# Patient Record
Sex: MALE | Race: WHITE | NOT HISPANIC OR LATINO | Employment: OTHER | URBAN - METROPOLITAN AREA
[De-identification: names, ages, dates, MRNs, and addresses within clinical notes are randomized per-mention and may not be internally consistent; named-entity substitution may affect disease eponyms.]

---

## 2017-11-04 ENCOUNTER — HOSPITAL ENCOUNTER (EMERGENCY)
Facility: HOSPITAL | Age: 82
Discharge: HOME | End: 2017-11-04
Attending: EMERGENCY MEDICINE | Admitting: EMERGENCY MEDICINE

## 2017-11-04 VITALS
WEIGHT: 215 LBS | HEART RATE: 72 BPM | HEIGHT: 72 IN | OXYGEN SATURATION: 96 % | TEMPERATURE: 96.2 F | BODY MASS INDEX: 29.12 KG/M2 | RESPIRATION RATE: 25 BRPM | SYSTOLIC BLOOD PRESSURE: 143 MMHG | DIASTOLIC BLOOD PRESSURE: 76 MMHG

## 2017-11-04 DIAGNOSIS — R11.2 NAUSEA VOMITING AND DIARRHEA: Primary | ICD-10-CM

## 2017-11-04 DIAGNOSIS — R19.7 NAUSEA VOMITING AND DIARRHEA: Primary | ICD-10-CM

## 2017-11-04 LAB
ALBUMIN SERPL-MCNC: 4.1 G/DL (ref 3.5–5.2)
ALBUMIN/GLOB SERPL: 1.2 G/DL
ALP SERPL-CCNC: 68 U/L (ref 39–117)
ALT SERPL W P-5'-P-CCNC: 24 U/L (ref 1–41)
ANION GAP SERPL CALCULATED.3IONS-SCNC: 16.5 MMOL/L
AST SERPL-CCNC: 38 U/L (ref 1–40)
BASOPHILS # BLD AUTO: 0.02 10*3/MM3 (ref 0–0.2)
BASOPHILS NFR BLD AUTO: 0.2 % (ref 0–1.5)
BILIRUB SERPL-MCNC: 0.6 MG/DL (ref 0.1–1.2)
BUN BLD-MCNC: 23 MG/DL (ref 8–23)
BUN/CREAT SERPL: 27.4 (ref 7–25)
CALCIUM SPEC-SCNC: 9.1 MG/DL (ref 8.6–10.5)
CHLORIDE SERPL-SCNC: 99 MMOL/L (ref 98–107)
CO2 SERPL-SCNC: 22.5 MMOL/L (ref 22–29)
CREAT BLD-MCNC: 0.84 MG/DL (ref 0.76–1.27)
DEPRECATED RDW RBC AUTO: 47.2 FL (ref 37–54)
EOSINOPHIL # BLD AUTO: 0.05 10*3/MM3 (ref 0–0.7)
EOSINOPHIL NFR BLD AUTO: 0.5 % (ref 0.3–6.2)
ERYTHROCYTE [DISTWIDTH] IN BLOOD BY AUTOMATED COUNT: 14 % (ref 11.5–14.5)
GFR SERPL CREATININE-BSD FRML MDRD: 86 ML/MIN/1.73
GLOBULIN UR ELPH-MCNC: 3.4 GM/DL
GLUCOSE BLD-MCNC: 130 MG/DL (ref 65–99)
HCT VFR BLD AUTO: 46 % (ref 40.4–52.2)
HGB BLD-MCNC: 14.8 G/DL (ref 13.7–17.6)
IMM GRANULOCYTES # BLD: 0.03 10*3/MM3 (ref 0–0.03)
IMM GRANULOCYTES NFR BLD: 0.3 % (ref 0–0.5)
LIPASE SERPL-CCNC: 16 U/L (ref 13–60)
LYMPHOCYTES # BLD AUTO: 1.65 10*3/MM3 (ref 0.9–4.8)
LYMPHOCYTES NFR BLD AUTO: 15.8 % (ref 19.6–45.3)
MCH RBC QN AUTO: 29.8 PG (ref 27–32.7)
MCHC RBC AUTO-ENTMCNC: 32.2 G/DL (ref 32.6–36.4)
MCV RBC AUTO: 92.7 FL (ref 79.8–96.2)
MONOCYTES # BLD AUTO: 1.69 10*3/MM3 (ref 0.2–1.2)
MONOCYTES NFR BLD AUTO: 16.2 % (ref 5–12)
NEUTROPHILS # BLD AUTO: 7 10*3/MM3 (ref 1.9–8.1)
NEUTROPHILS NFR BLD AUTO: 67 % (ref 42.7–76)
PLATELET # BLD AUTO: 198 10*3/MM3 (ref 140–500)
PMV BLD AUTO: 11 FL (ref 6–12)
POTASSIUM BLD-SCNC: 4 MMOL/L (ref 3.5–5.2)
PROT SERPL-MCNC: 7.5 G/DL (ref 6–8.5)
RBC # BLD AUTO: 4.96 10*6/MM3 (ref 4.6–6)
SODIUM BLD-SCNC: 138 MMOL/L (ref 136–145)
WBC NRBC COR # BLD: 10.44 10*3/MM3 (ref 4.5–10.7)

## 2017-11-04 PROCEDURE — 96361 HYDRATE IV INFUSION ADD-ON: CPT

## 2017-11-04 PROCEDURE — 83690 ASSAY OF LIPASE: CPT | Performed by: EMERGENCY MEDICINE

## 2017-11-04 PROCEDURE — 96374 THER/PROPH/DIAG INJ IV PUSH: CPT

## 2017-11-04 PROCEDURE — 25010000002 ONDANSETRON PER 1 MG: Performed by: EMERGENCY MEDICINE

## 2017-11-04 PROCEDURE — 80053 COMPREHEN METABOLIC PANEL: CPT | Performed by: EMERGENCY MEDICINE

## 2017-11-04 PROCEDURE — 85025 COMPLETE CBC W/AUTO DIFF WBC: CPT | Performed by: EMERGENCY MEDICINE

## 2017-11-04 PROCEDURE — 99284 EMERGENCY DEPT VISIT MOD MDM: CPT

## 2017-11-04 RX ORDER — ONDANSETRON 4 MG/1
4 TABLET, FILM COATED ORAL EVERY 8 HOURS PRN
Qty: 10 TABLET | Refills: 0 | Status: SHIPPED | OUTPATIENT
Start: 2017-11-04 | End: 2022-10-08

## 2017-11-04 RX ORDER — DIPHENOXYLATE HYDROCHLORIDE AND ATROPINE SULFATE 2.5; .025 MG/1; MG/1
1-2 TABLET ORAL 4 TIMES DAILY PRN
Qty: 20 TABLET | Refills: 0 | Status: SHIPPED | OUTPATIENT
Start: 2017-11-04 | End: 2022-10-08

## 2017-11-04 RX ORDER — ONDANSETRON 2 MG/ML
4 INJECTION INTRAMUSCULAR; INTRAVENOUS ONCE
Status: COMPLETED | OUTPATIENT
Start: 2017-11-04 | End: 2017-11-04

## 2017-11-04 RX ORDER — TERAZOSIN 1 MG/1
1 CAPSULE ORAL NIGHTLY
COMMUNITY

## 2017-11-04 RX ORDER — ASPIRIN 81 MG/1
81 TABLET ORAL DAILY
COMMUNITY
End: 2022-10-08

## 2017-11-04 RX ORDER — SODIUM CHLORIDE 0.9 % (FLUSH) 0.9 %
10 SYRINGE (ML) INJECTION AS NEEDED
Status: DISCONTINUED | OUTPATIENT
Start: 2017-11-04 | End: 2017-11-04 | Stop reason: HOSPADM

## 2017-11-04 RX ORDER — SODIUM CHLORIDE 9 MG/ML
125 INJECTION, SOLUTION INTRAVENOUS CONTINUOUS
Status: DISCONTINUED | OUTPATIENT
Start: 2017-11-04 | End: 2017-11-04 | Stop reason: HOSPADM

## 2017-11-04 RX ADMIN — SODIUM CHLORIDE 500 ML: 9 INJECTION, SOLUTION INTRAVENOUS at 02:00

## 2017-11-04 RX ADMIN — SODIUM CHLORIDE 125 ML/HR: 9 INJECTION, SOLUTION INTRAVENOUS at 02:00

## 2017-11-04 RX ADMIN — ONDANSETRON 4 MG: 2 INJECTION INTRAMUSCULAR; INTRAVENOUS at 02:03

## 2017-11-04 NOTE — ED TRIAGE NOTES
Pt reports nausea, vomiting and diarrhea since Thursday, states he tried to take immodium without relief. Denies abd. Pain

## 2017-11-04 NOTE — ED PROVIDER NOTES
EMERGENCY DEPARTMENT ENCOUNTER    CHIEF COMPLAINT  Chief Complaint: Nausea  History given by: Patient  History limited by: nothing  Room Number: 15/15  PMD: Beverly Lino MD      HPI:  Pt is a 88 y.o. male who presents complaining of nausea with waxing and waning diarrhea and vomiting for two days. Pt denies abd pain, vomiting blood, or melena. Pt denies use of antibiotics, hx of c diff, colitis, or diverticulitis. Pt has hx of umbilical hernia. . Pt has been around family members with similar symptoms.      Duration:  2 days  Onset: gradual  Timing: waxing and waning  Location: GI tract  Radiation: none  Quality: nausea  Intensity/Severity: mild  Progression: unchanged  Associated Symptoms: vomiting and diarrhea  Aggravating Factors: none  Alleviating Factors: none  Previous Episodes: none  Treatment before arrival: none    PAST MEDICAL HISTORY  Active Ambulatory Problems     Diagnosis Date Noted   • No Active Ambulatory Problems     Resolved Ambulatory Problems     Diagnosis Date Noted   • No Resolved Ambulatory Problems     Past Medical History:   Diagnosis Date   • Hyperlipidemia    • Hypertension        PAST SURGICAL HISTORY  Past Surgical History:   Procedure Laterality Date   • BACK SURGERY         FAMILY HISTORY  History reviewed. No pertinent family history.    SOCIAL HISTORY  Social History     Social History   • Marital status:      Spouse name: N/A   • Number of children: N/A   • Years of education: N/A     Occupational History   • Not on file.     Social History Main Topics   • Smoking status: Former Smoker   • Smokeless tobacco: Not on file   • Alcohol use Not on file   • Drug use: No   • Sexual activity: Defer     Other Topics Concern   • Not on file     Social History Narrative   • No narrative on file       ALLERGIES  Penicillins    REVIEW OF SYSTEMS  Review of Systems   Constitutional: Negative for chills and fever.   HENT: Negative for congestion and sore throat.    Eyes:  Negative.    Respiratory: Negative for cough and shortness of breath.    Cardiovascular: Negative for chest pain and leg swelling.   Gastrointestinal: Positive for diarrhea, nausea and vomiting (no blood). Negative for abdominal pain and blood in stool.   Genitourinary: Negative for difficulty urinating and dysuria.   Musculoskeletal: Negative for back pain and neck pain.   Skin: Negative for rash and wound.   Allergic/Immunologic: Negative.    Neurological: Negative for dizziness, weakness, numbness and headaches.   Psychiatric/Behavioral: Negative.    All other systems reviewed and are negative.      PHYSICAL EXAM  ED Triage Vitals   Temp Heart Rate Resp BP SpO2   11/04/17 0128 11/04/17 0128 11/04/17 0128 11/04/17 0128 11/04/17 0128   96.2 °F (35.7 °C) 66 18 140/68 97 %      Temp src Heart Rate Source Patient Position BP Location FiO2 (%)   11/04/17 0128 11/04/17 0128 -- -- --   Tympanic Monitor          Physical Exam   Constitutional: He is oriented to person, place, and time and well-developed, well-nourished, and in no distress.   HENT:   Head: Normocephalic and atraumatic.   Eyes: EOM are normal. Pupils are equal, round, and reactive to light.   Neck: Normal range of motion. Neck supple.   Cardiovascular: Normal rate, regular rhythm and normal heart sounds.    Pulmonary/Chest: Effort normal and breath sounds normal. No respiratory distress.   Abdominal: Soft. There is no tenderness. There is no rebound and no guarding.   Pt has a small periumbilical hernia that is easily reducible.    Musculoskeletal: Normal range of motion. He exhibits no edema.   Neurological: He is alert and oriented to person, place, and time. He has normal sensation and normal strength.   Skin: Skin is warm and dry.   Psychiatric: Mood and affect normal.   Nursing note and vitals reviewed.      LAB RESULTS  Lab Results (last 24 hours)     Procedure Component Value Units Date/Time    CBC & Differential [[id number]] Collected:  11/04/17 0157     Specimen:  Blood Updated:  11/04/17 0219    Narrative:       The following orders were created for panel order CBC & Differential.  Procedure                               Abnormality         Status                     ---------                               -----------         ------                     CBC Auto Differential[[id number]]        Abnormal            Final result                 Please view results for these tests on the individual orders.    Comprehensive Metabolic Panel [[id number]]  (Abnormal) Collected:  11/04/17 0157    Specimen:  Blood Updated:  11/04/17 0234     Glucose 130 (H) mg/dL      BUN 23 mg/dL      Creatinine 0.84 mg/dL      Sodium 138 mmol/L      Potassium 4.0 mmol/L      Chloride 99 mmol/L      CO2 22.5 mmol/L      Calcium 9.1 mg/dL      Total Protein 7.5 g/dL      Albumin 4.10 g/dL      ALT (SGPT) 24 U/L      AST (SGOT) 38 U/L       Specimen hemolyzed.  Results may be affected.        Alkaline Phosphatase 68 U/L      Total Bilirubin 0.6 mg/dL      eGFR Non African Amer 86 mL/min/1.73      Globulin 3.4 gm/dL      A/G Ratio 1.2 g/dL      BUN/Creatinine Ratio 27.4 (H)     Anion Gap 16.5 mmol/L     Narrative:       The MDRD GFR formula is only valid for adults with stable renal function between ages 18 and 70.    Lipase [[id number]]  (Normal) Collected:  11/04/17 0157    Specimen:  Blood Updated:  11/04/17 0231     Lipase 16 U/L     CBC Auto Differential [[id number]]  (Abnormal) Collected:  11/04/17 0157    Specimen:  Blood Updated:  11/04/17 0219     WBC 10.44 10*3/mm3      RBC 4.96 10*6/mm3      Hemoglobin 14.8 g/dL      Hematocrit 46.0 %      MCV 92.7 fL      MCH 29.8 pg      MCHC 32.2 (L) g/dL      RDW 14.0 %      RDW-SD 47.2 fl      MPV 11.0 fL      Platelets 198 10*3/mm3      Neutrophil % 67.0 %      Lymphocyte % 15.8 (L) %      Monocyte % 16.2 (H) %      Eosinophil % 0.5 %      Basophil % 0.2 %      Immature Grans % 0.3 %      Neutrophils, Absolute 7.00 10*3/mm3      Lymphocytes,  Absolute 1.65 10*3/mm3      Monocytes, Absolute 1.69 (H) 10*3/mm3      Eosinophils, Absolute 0.05 10*3/mm3      Basophils, Absolute 0.02 10*3/mm3      Immature Grans, Absolute 0.03 10*3/mm3           I ordered the above labs and reviewed the results    Procedures      PROGRESS AND CONSULTS  ED Course     0145  Zofran ordered.  Labs ordered for further evaluation.    0240  Patient rechecked and resting comfortably with improved nausea. Discussed negative lab results and plan to begin trial of oral fluids. Pt understands and agrees with the plan, all questions answered.    0315  Patient rechecked and tolerating fluids well. Discussed plan for discharge of pt with instructions for progressive diet. Pt and family understand and agree with plan, all questions answered.       MEDICAL DECISION MAKING  Results were reviewed/discussed with the patient and they were also made aware of online access. Pt also made aware that some labs, such as cultures, will not be resulted during ER visit and follow up with PMD is necessary.     MDM  Number of Diagnoses or Management Options     Amount and/or Complexity of Data Reviewed  Clinical lab tests: ordered and reviewed (Glucose 130  WBC 10.44)           DIAGNOSIS  Final diagnoses:   Nausea vomiting and diarrhea       DISPOSITION  DISCHARGE    Patient discharged in stable condition.    Reviewed implications of results, diagnosis, meds, responsibility to follow up, warning signs and symptoms of possible worsening, potential complications and reasons to return to ER.    Patient/Family voiced understanding of above instructions.    Discussed plan for discharge, as there is no emergent indication for admission.  Pt/family is agreeable and understands need for follow up and repeat testing.  Pt is aware that discharge does not mean that nothing is wrong but it indicates no emergency is present that requires admission and they must continue care with follow-up as given below or physician of  their choice.     FOLLOW-UP  Beverly Lino MD  [address] [facility]  [address]  [place] [address]  [phone]    Call           Medication List      New Prescriptions          diphenoxylate-atropine 2.5-0.025 MG per tablet   Commonly known as:  LOMOTIL   Take 1-2 tablets by mouth 4 (Four) Times a Day As Needed for Diarrhea.       ondansetron 4 MG tablet   Commonly known as:  ZOFRAN   Take 1 tablet by mouth Every 8 (Eight) Hours As Needed for Nausea.             Latest Documented Vital Signs:  As of 3:18 AM  BP- 123/82 HR- 75 Temp- 96.2 °F (35.7 °C) (Tympanic) O2 sat- 92%    --  Documentation assistance provided by kulwant Willis and Samantha Mckeon for Dr. Rosario.  Information recorded by the scribe was done at my direction and has been verified and validated by me.          Dhara Willis  11/04/17 0319       Samantha Mckeon  11/04/17 0322       Yuval Rosario MD  11/04/17 0645

## 2019-06-07 ENCOUNTER — APPOINTMENT (OUTPATIENT)
Dept: GENERAL RADIOLOGY | Facility: HOSPITAL | Age: 84
End: 2019-06-07

## 2019-06-07 ENCOUNTER — APPOINTMENT (OUTPATIENT)
Dept: CT IMAGING | Facility: HOSPITAL | Age: 84
End: 2019-06-07

## 2019-06-07 ENCOUNTER — HOSPITAL ENCOUNTER (EMERGENCY)
Facility: HOSPITAL | Age: 84
Discharge: HOME | End: 2019-06-07
Attending: EMERGENCY MEDICINE | Admitting: EMERGENCY MEDICINE

## 2019-06-07 VITALS
BODY MASS INDEX: 31.15 KG/M2 | TEMPERATURE: 96.8 F | OXYGEN SATURATION: 97 % | DIASTOLIC BLOOD PRESSURE: 82 MMHG | WEIGHT: 230 LBS | HEART RATE: 72 BPM | HEIGHT: 72 IN | SYSTOLIC BLOOD PRESSURE: 164 MMHG | RESPIRATION RATE: 16 BRPM

## 2019-06-07 DIAGNOSIS — S01.81XA FACIAL LACERATION, INITIAL ENCOUNTER: ICD-10-CM

## 2019-06-07 DIAGNOSIS — S60.211A CONTUSION OF RIGHT WRIST, INITIAL ENCOUNTER: ICD-10-CM
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
## 2019-06-07 DIAGNOSIS — S60.212A CONTUSION OF LEFT WRIST, INITIAL ENCOUNTER: ICD-10-CM

## 2019-06-07 PROCEDURE — 73110 X-RAY EXAM OF WRIST: CPT

## 2019-06-07 PROCEDURE — 70450 CT HEAD/BRAIN W/O DYE: CPT

## 2019-06-07 PROCEDURE — 99285 EMERGENCY DEPT VISIT HI MDM: CPT

## 2019-06-07 RX ORDER — OXYCODONE HYDROCHLORIDE AND ACETAMINOPHEN 5; 325 MG/1; MG/1
1 TABLET ORAL ONCE
Status: COMPLETED | OUTPATIENT
Start: 2019-06-07 | End: 2019-06-07

## 2019-06-07 RX ORDER — HYDROCODONE BITARTRATE AND ACETAMINOPHEN 7.5; 325 MG/1; MG/1
1 TABLET ORAL EVERY 4 HOURS PRN
Qty: 18 TABLET | Refills: 0 | Status: SHIPPED | OUTPATIENT
Start: 2019-06-07 | End: 2022-10-08

## 2019-06-07 RX ADMIN — OXYCODONE HYDROCHLORIDE AND ACETAMINOPHEN 1 TABLET: 5; 325 TABLET ORAL at 05:28

## 2019-06-07 RX ADMIN — OXYCODONE HYDROCHLORIDE AND ACETAMINOPHEN 1 TABLET: 5; 325 TABLET ORAL at 03:00

## 2019-06-07 NOTE — ED PROVIDER NOTES
EMERGENCY DEPARTMENT ENCOUNTER    CHIEF COMPLAINT  Chief Complaint: Fall  History given by: patient   History limited by: n/a   Room Number: 14/14  PMD: Beverly Lino MD      HPI:  Pt is a 89 y.o. male who presents to the ED for evaluation after a fall. Pt states that he rolled out of bed, striking the left side of his head on the bedside table. He also complains of bilateral wrist pain. He states that he was able to get up and ambulate after the fall. He denies LOC. Pt takes 81 mg of ASA daily.     Duration:  Prior to arrival  Onset: sudden  Timing: constant   Radiation: n/a   Quality: mechanical fall  Intensity/Severity: moderate  Progression: n/a   Associated Symptoms: head injury, bilateral wrist pain  Aggravating Factors: none  Alleviating Factors: none  Previous Episodes: none    PAST MEDICAL HISTORY  Active Ambulatory Problems     Diagnosis Date Noted   • No Active Ambulatory Problems     Resolved Ambulatory Problems     Diagnosis Date Noted   • No Resolved Ambulatory Problems     Past Medical History:   Diagnosis Date   • Hyperlipidemia    • Hypertension        PAST SURGICAL HISTORY  Past Surgical History:   Procedure Laterality Date   • BACK SURGERY         FAMILY HISTORY  No family history on file.    SOCIAL HISTORY  Social History     Socioeconomic History   • Marital status:      Spouse name: Not on file   • Number of children: Not on file   • Years of education: Not on file   • Highest education level: Not on file   Tobacco Use   • Smoking status: Former Smoker   Substance and Sexual Activity   • Drug use: No   • Sexual activity: Defer       ALLERGIES  Penicillins    REVIEW OF SYSTEMS  Review of Systems   Constitutional: Negative for activity change, appetite change and fever.   HENT: Negative for congestion and sore throat.    Eyes: Negative.    Respiratory: Negative for cough and shortness of breath.    Cardiovascular: Negative for chest pain and leg swelling.   Gastrointestinal:  Negative for abdominal pain, diarrhea and vomiting.   Endocrine: Negative.    Genitourinary: Negative for decreased urine volume and dysuria.   Musculoskeletal: Positive for arthralgias (bilateral wrists). Negative for neck pain.   Skin: Negative for rash and wound.   Allergic/Immunologic: Negative.    Neurological: Negative for weakness, numbness and headaches.   Hematological: Negative.    Psychiatric/Behavioral: Negative.    All other systems reviewed and are negative.      PHYSICAL EXAM  ED Triage Vitals [06/07/19 0214]   Temp Heart Rate Resp BP SpO2   96.8 °F (36 °C) 70 16 (!) 181/89 98 %      Temp src Heart Rate Source Patient Position BP Location FiO2 (%)   Tympanic Monitor Lying Left arm --       Physical Exam   Constitutional: He is oriented to person, place, and time. No distress.   HENT:   Head: Normocephalic. Head is with laceration (1 cm left brow).   Eyes: EOM are normal. Pupils are equal, round, and reactive to light.   Neck: Normal range of motion. Neck supple.   Cardiovascular: Normal rate, regular rhythm and normal heart sounds.   Pulmonary/Chest: Effort normal and breath sounds normal. No respiratory distress.   Abdominal: Soft. There is no tenderness. There is no rebound and no guarding.   Musculoskeletal: Normal range of motion. He exhibits no edema.        Right wrist: He exhibits tenderness (medial). He exhibits no swelling and no deformity.        Left wrist: He exhibits tenderness (medial). He exhibits no swelling and no deformity.   Neurological: He is alert and oriented to person, place, and time. He has normal sensation and normal strength.   Skin: Skin is warm and dry.   Psychiatric: Mood and affect normal.   Nursing note and vitals reviewed.    RADIOLOGY  XR Wrist 3+ View Bilateral   Final Result   No acute fracture or subluxation identified.       This report was finalized on 6/7/2019 3:56 AM by Dr. Jeannie Castellanos M.D.          CT Head Without Contrast   Final Result   No acute  intracranial hemorrhage identified.       Radiation dose reduction techniques were utilized, including automated   exposure control and exposure modulation based on body size.       This report was finalized on 6/7/2019 3:28 AM by Dr. Jeanine Castellanos M.D.               I ordered the above noted radiological studies. Interpreted by radiologist.  Reviewed by me in PACS.       PROCEDURES  Laceration Repair  Date/Time: 6/7/2019 5:25 AM  Performed by: Ammon Perkins MD  Authorized by: Ammon Perkins MD     Consent:     Consent obtained:  Verbal    Consent given by:  Patient  Anesthesia (see MAR for exact dosages):     Anesthesia method:  None  Laceration details:     Location:  Face    Face location:  L eyebrow    Length (cm):  1  Repair type:     Repair type:  Simple  Treatment:     Area cleansed with:  Saline    Amount of cleaning:  Standard    Irrigation solution:  Sterile saline    Irrigation method:  Tap  Skin repair:     Repair method:  Tissue adhesive  Approximation:     Approximation:  Close    Vermilion border: well-aligned    Post-procedure details:     Dressing:  Open (no dressing)    Patient tolerance of procedure:  Tolerated well, no immediate complications          PROGRESS AND CONSULTS       02:35  BP- (!) 181/89 HR- 70 Temp- 96.8 °F (36 °C) (Tympanic) O2 sat- 97%  Informed pt of the plan for CT head and XR wrist. Pt understands and agrees with the plan, all questions answered.    02:40  CT head and XR bilateral wrists ordered.     02:56  Percocet ordered to treat pain.     04:18  BP- (!) 181/89 HR- 70 Temp- 96.8 °F (36 °C) (Tympanic) O2 sat- 96%  Rechecked the patient who is in NAD and is resting comfortably. Pt reports continued pain. Informed pt that the XR and CT head show NAD. Will place bilateral wrists in splints for comfort. Informed him of the plan to repair the laceration with skin adhesive. Pt understands and agrees with the plan, all questions answered    05:22  Percocet ordered  to treat pain.     05:25  Laceration repaired with tissue adhesive. Pt will be discharged.     MEDICAL DECISION MAKING  Results were reviewed/discussed with the patient and they were also made aware of online access. Pt also made aware that some labs, such as cultures, will not be resulted during ER visit and follow up with PMD is necessary.     MDM  Number of Diagnoses or Management Options  Contusion of left wrist, initial encounter:   Contusion of right wrist, initial encounter:   Facial laceration, initial encounter:   Fall from bed, initial encounter:      Amount and/or Complexity of Data Reviewed  Tests in the radiology section of CPT®: ordered and reviewed (CT head shows NAD. XR bilateral wrists show NAD)    Patient Progress  Patient progress: stable         DIAGNOSIS  Final diagnoses:   Fall from bed, initial encounter   Facial laceration, initial encounter   Contusion of right wrist, initial encounter   Contusion of left wrist, initial encounter       DISPOSITION  DISCHARGE    Patient discharged in stable condition.    Reviewed implications of results, diagnosis, meds, responsibility to follow up, warning signs and symptoms of possible worsening, potential complications and reasons to return to ER.    Patient/Family voiced understanding of above instructions.    Discussed plan for discharge, as there is no emergent indication for admission. Patient referred to primary care provider for BP management due to today's BP. Pt/family is agreeable and understands need for follow up and repeat testing.  Pt is aware that discharge does not mean that nothing is wrong but it indicates no emergency is present that requires admission and they must continue care with follow-up as given below or physician of their choice.     FOLLOW-UP  Beverly Lino MD  [address] [place] [address]  [address]  [place] [address]  [phone]    Schedule an appointment as soon as possible for a visit           Medication List      New  Prescriptions    HYDROcodone-acetaminophen 7.5-325 MG per tablet  Commonly known as:  NORCO  Take 1 tablet by mouth Every 4 (Four) Hours As Needed for Moderate Pain .          Latest Documented Vital Signs:  As of 6:51 AM  BP- 164/82 HR- 72 Temp- 96.8 °F (36 °C) (Tympanic) O2 sat- 97%    --  Documentation assistance provided by kulwant Mckeon for Dr Perkins.  Information recorded by the scribe was done at my direction and has been verified and validated by me.     Samantha Mckeon  06/07/19 0530       Ammon Perkins MD  06/07/19 0651

## 2019-06-07 NOTE — ED NOTES
Splints placed on both wrists, pt reports some improvement in pain after application. Good radial pulses bilaterally. Face cleaned with sterile saline in prep for skin closure. Pt tolerated well.     Teresa Medina RN  06/07/19 0591

## 2019-06-07 NOTE — ED NOTES
"Pt updated on status. Pt states pain is still severe, rates it \"9 1/2\". MD aware.     Teresa Medina, RN  06/07/19 0243    "

## 2019-06-07 NOTE — DISCHARGE INSTRUCTIONS
Home, rest, home medicine as prescribed, apply ice to the wrists, follow up with PCP for recheck. Return to care with further concerns.

## 2022-10-08 ENCOUNTER — HOSPITAL ENCOUNTER (INPATIENT)
Facility: HOSPITAL | Age: 87
LOS: 7 days | Discharge: HOME HEALTH | End: 2022-10-15
Attending: EMERGENCY MEDICINE | Admitting: INTERNAL MEDICINE

## 2022-10-08 ENCOUNTER — APPOINTMENT (OUTPATIENT)
Dept: GENERAL RADIOLOGY | Facility: HOSPITAL | Age: 87
End: 2022-10-08

## 2022-10-08 ENCOUNTER — APPOINTMENT (OUTPATIENT)
Dept: CT IMAGING | Facility: HOSPITAL | Age: 87
End: 2022-10-08

## 2022-10-08 DIAGNOSIS — R29.6 FREQUENT FALLS: ICD-10-CM

## 2022-10-08 DIAGNOSIS — J81.0 ACUTE PULMONARY EDEMA: ICD-10-CM

## 2022-10-08 DIAGNOSIS — I48.91 ATRIAL FIBRILLATION WITH RVR: ICD-10-CM

## 2022-10-08 DIAGNOSIS — G93.41 ACUTE METABOLIC ENCEPHALOPATHY: Primary | ICD-10-CM

## 2022-10-08 PROBLEM — E03.9 HYPOTHYROIDISM: Status: ACTIVE | Noted: 2022-10-08

## 2022-10-08 PROBLEM — N40.0 BPH (BENIGN PROSTATIC HYPERPLASIA): Status: ACTIVE | Noted: 2022-10-08

## 2022-10-08 PROBLEM — I50.9 CHF (CONGESTIVE HEART FAILURE): Status: ACTIVE | Noted: 2022-10-08

## 2022-10-08 LAB
ALBUMIN SERPL-MCNC: 4 G/DL (ref 3.5–5.2)
ALBUMIN/GLOB SERPL: 1.6 G/DL
ALP SERPL-CCNC: 82 U/L (ref 39–117)
ALT SERPL W P-5'-P-CCNC: 31 U/L (ref 1–41)
ANION GAP SERPL CALCULATED.3IONS-SCNC: 15.4 MMOL/L (ref 5–15)
AST SERPL-CCNC: 33 U/L (ref 1–40)
BACTERIA UR QL AUTO: ABNORMAL /HPF
BASOPHILS # BLD AUTO: 0.05 10*3/MM3 (ref 0–0.2)
BASOPHILS NFR BLD AUTO: 0.5 % (ref 0–1.5)
BILIRUB SERPL-MCNC: 2.2 MG/DL (ref 0–1.2)
BILIRUB UR QL STRIP: NEGATIVE
BUN SERPL-MCNC: 30 MG/DL (ref 8–23)
BUN/CREAT SERPL: 22.2 (ref 7–25)
CALCIUM SPEC-SCNC: 9.6 MG/DL (ref 8.2–9.6)
CHLORIDE SERPL-SCNC: 106 MMOL/L (ref 98–107)
CLARITY UR: CLEAR
CO2 SERPL-SCNC: 25.6 MMOL/L (ref 22–29)
COLOR UR: ABNORMAL
CREAT SERPL-MCNC: 1.35 MG/DL (ref 0.76–1.27)
D-LACTATE SERPL-SCNC: 2.8 MMOL/L (ref 0.5–2)
DEPRECATED RDW RBC AUTO: 42.4 FL (ref 37–54)
EGFRCR SERPLBLD CKD-EPI 2021: 49 ML/MIN/1.73
EOSINOPHIL # BLD AUTO: 0.02 10*3/MM3 (ref 0–0.4)
EOSINOPHIL NFR BLD AUTO: 0.2 % (ref 0.3–6.2)
ERYTHROCYTE [DISTWIDTH] IN BLOOD BY AUTOMATED COUNT: 13.4 % (ref 12.3–15.4)
GLOBULIN UR ELPH-MCNC: 2.5 GM/DL
GLUCOSE SERPL-MCNC: 112 MG/DL (ref 65–99)
GLUCOSE UR STRIP-MCNC: NEGATIVE MG/DL
HCT VFR BLD AUTO: 40.6 % (ref 37.5–51)
HGB BLD-MCNC: 13.2 G/DL (ref 13–17.7)
HGB UR QL STRIP.AUTO: NEGATIVE
HYALINE CASTS UR QL AUTO: ABNORMAL /LPF
IMM GRANULOCYTES # BLD AUTO: 0.03 10*3/MM3 (ref 0–0.05)
IMM GRANULOCYTES NFR BLD AUTO: 0.3 % (ref 0–0.5)
INR PPP: 1.19 (ref 0.9–1.1)
KETONES UR QL STRIP: NEGATIVE
LEUKOCYTE ESTERASE UR QL STRIP.AUTO: ABNORMAL
LYMPHOCYTES # BLD AUTO: 1.14 10*3/MM3 (ref 0.7–3.1)
LYMPHOCYTES NFR BLD AUTO: 11.6 % (ref 19.6–45.3)
MAGNESIUM SERPL-MCNC: 2.1 MG/DL (ref 1.7–2.3)
MCH RBC QN AUTO: 28.1 PG (ref 26.6–33)
MCHC RBC AUTO-ENTMCNC: 32.5 G/DL (ref 31.5–35.7)
MCV RBC AUTO: 86.4 FL (ref 79–97)
MONOCYTES # BLD AUTO: 1.17 10*3/MM3 (ref 0.1–0.9)
MONOCYTES NFR BLD AUTO: 11.9 % (ref 5–12)
NEUTROPHILS NFR BLD AUTO: 7.4 10*3/MM3 (ref 1.7–7)
NEUTROPHILS NFR BLD AUTO: 75.5 % (ref 42.7–76)
NITRITE UR QL STRIP: NEGATIVE
NRBC BLD AUTO-RTO: 0 /100 WBC (ref 0–0.2)
NT-PROBNP SERPL-MCNC: 9434 PG/ML (ref 0–1800)
PH UR STRIP.AUTO: <=5 [PH] (ref 5–8)
PLATELET # BLD AUTO: 196 10*3/MM3 (ref 140–450)
PMV BLD AUTO: 11.4 FL (ref 6–12)
POTASSIUM SERPL-SCNC: 3.9 MMOL/L (ref 3.5–5.2)
PROCALCITONIN SERPL-MCNC: 0.06 NG/ML (ref 0–0.25)
PROT SERPL-MCNC: 6.5 G/DL (ref 6–8.5)
PROT UR QL STRIP: ABNORMAL
PROTHROMBIN TIME: 15.2 SECONDS (ref 11.7–14.2)
QT INTERVAL: 312 MS
RBC # BLD AUTO: 4.7 10*6/MM3 (ref 4.14–5.8)
RBC # UR STRIP: ABNORMAL /HPF
REF LAB TEST METHOD: ABNORMAL
SARS-COV-2 RNA RESP QL NAA+PROBE: NOT DETECTED
SODIUM SERPL-SCNC: 147 MMOL/L (ref 136–145)
SP GR UR STRIP: 1.02 (ref 1–1.03)
SQUAMOUS #/AREA URNS HPF: ABNORMAL /HPF
T4 FREE SERPL-MCNC: 1.3 NG/DL (ref 0.93–1.7)
TROPONIN T SERPL-MCNC: 0.03 NG/ML (ref 0–0.03)
TSH SERPL DL<=0.05 MIU/L-ACNC: 4.03 UIU/ML (ref 0.27–4.2)
UROBILINOGEN UR QL STRIP: ABNORMAL
WBC # UR STRIP: ABNORMAL /HPF
WBC NRBC COR # BLD: 9.81 10*3/MM3 (ref 3.4–10.8)

## 2022-10-08 PROCEDURE — 71045 X-RAY EXAM CHEST 1 VIEW: CPT

## 2022-10-08 PROCEDURE — 99285 EMERGENCY DEPT VISIT HI MDM: CPT

## 2022-10-08 PROCEDURE — 81001 URINALYSIS AUTO W/SCOPE: CPT | Performed by: EMERGENCY MEDICINE

## 2022-10-08 PROCEDURE — 80053 COMPREHEN METABOLIC PANEL: CPT | Performed by: EMERGENCY MEDICINE

## 2022-10-08 PROCEDURE — 70450 CT HEAD/BRAIN W/O DYE: CPT

## 2022-10-08 PROCEDURE — 84443 ASSAY THYROID STIM HORMONE: CPT | Performed by: EMERGENCY MEDICINE

## 2022-10-08 PROCEDURE — 85610 PROTHROMBIN TIME: CPT | Performed by: EMERGENCY MEDICINE

## 2022-10-08 PROCEDURE — 84484 ASSAY OF TROPONIN QUANT: CPT | Performed by: EMERGENCY MEDICINE

## 2022-10-08 PROCEDURE — 83880 ASSAY OF NATRIURETIC PEPTIDE: CPT | Performed by: EMERGENCY MEDICINE

## 2022-10-08 PROCEDURE — 84145 PROCALCITONIN (PCT): CPT | Performed by: EMERGENCY MEDICINE

## 2022-10-08 PROCEDURE — 83605 ASSAY OF LACTIC ACID: CPT | Performed by: EMERGENCY MEDICINE

## 2022-10-08 PROCEDURE — 25010000002 FUROSEMIDE PER 20 MG: Performed by: EMERGENCY MEDICINE

## 2022-10-08 PROCEDURE — 25010000002 ZIPRASIDONE MESYLATE PER 10 MG: Performed by: EMERGENCY MEDICINE

## 2022-10-08 PROCEDURE — U0003 INFECTIOUS AGENT DETECTION BY NUCLEIC ACID (DNA OR RNA); SEVERE ACUTE RESPIRATORY SYNDROME CORONAVIRUS 2 (SARS-COV-2) (CORONAVIRUS DISEASE [COVID-19]), AMPLIFIED PROBE TECHNIQUE, MAKING USE OF HIGH THROUGHPUT TECHNOLOGIES AS DESCRIBED BY CMS-2020-01-R: HCPCS | Performed by: EMERGENCY MEDICINE

## 2022-10-08 PROCEDURE — 93010 ELECTROCARDIOGRAM REPORT: CPT | Performed by: INTERNAL MEDICINE

## 2022-10-08 PROCEDURE — 99284 EMERGENCY DEPT VISIT MOD MDM: CPT

## 2022-10-08 PROCEDURE — 83735 ASSAY OF MAGNESIUM: CPT | Performed by: EMERGENCY MEDICINE

## 2022-10-08 PROCEDURE — 85025 COMPLETE CBC W/AUTO DIFF WBC: CPT | Performed by: EMERGENCY MEDICINE

## 2022-10-08 PROCEDURE — 25010000002 LORAZEPAM PER 2 MG: Performed by: NURSE PRACTITIONER

## 2022-10-08 PROCEDURE — 93005 ELECTROCARDIOGRAM TRACING: CPT | Performed by: EMERGENCY MEDICINE

## 2022-10-08 PROCEDURE — U0005 INFEC AGEN DETEC AMPLI PROBE: HCPCS | Performed by: EMERGENCY MEDICINE

## 2022-10-08 PROCEDURE — 84439 ASSAY OF FREE THYROXINE: CPT | Performed by: EMERGENCY MEDICINE

## 2022-10-08 PROCEDURE — 36415 COLL VENOUS BLD VENIPUNCTURE: CPT

## 2022-10-08 PROCEDURE — P9612 CATHETERIZE FOR URINE SPEC: HCPCS

## 2022-10-08 RX ORDER — LEVOTHYROXINE SODIUM 0.05 MG/1
50 TABLET ORAL
COMMUNITY

## 2022-10-08 RX ORDER — SODIUM CHLORIDE 0.9 % (FLUSH) 0.9 %
10 SYRINGE (ML) INJECTION AS NEEDED
Status: DISCONTINUED | OUTPATIENT
Start: 2022-10-08 | End: 2022-10-15 | Stop reason: HOSPADM

## 2022-10-08 RX ORDER — ACETAMINOPHEN 160 MG/5ML
650 SOLUTION ORAL EVERY 4 HOURS PRN
Status: DISCONTINUED | OUTPATIENT
Start: 2022-10-08 | End: 2022-10-15 | Stop reason: HOSPADM

## 2022-10-08 RX ORDER — NITROGLYCERIN 0.4 MG/1
0.4 TABLET SUBLINGUAL
Status: DISCONTINUED | OUTPATIENT
Start: 2022-10-08 | End: 2022-10-15 | Stop reason: HOSPADM

## 2022-10-08 RX ORDER — LORAZEPAM 2 MG/ML
1 INJECTION INTRAMUSCULAR ONCE
Status: COMPLETED | OUTPATIENT
Start: 2022-10-08 | End: 2022-10-08

## 2022-10-08 RX ORDER — ONDANSETRON 4 MG/1
4 TABLET, FILM COATED ORAL EVERY 6 HOURS PRN
Status: DISCONTINUED | OUTPATIENT
Start: 2022-10-08 | End: 2022-10-15 | Stop reason: HOSPADM

## 2022-10-08 RX ORDER — ZIPRASIDONE MESYLATE 20 MG/ML
10 INJECTION, POWDER, LYOPHILIZED, FOR SOLUTION INTRAMUSCULAR ONCE
Status: COMPLETED | OUTPATIENT
Start: 2022-10-08 | End: 2022-10-08

## 2022-10-08 RX ORDER — FUROSEMIDE 10 MG/ML
40 INJECTION INTRAMUSCULAR; INTRAVENOUS EVERY 12 HOURS
Status: DISCONTINUED | OUTPATIENT
Start: 2022-10-09 | End: 2022-10-10

## 2022-10-08 RX ORDER — ACETAMINOPHEN 650 MG/1
650 SUPPOSITORY RECTAL EVERY 4 HOURS PRN
Status: DISCONTINUED | OUTPATIENT
Start: 2022-10-08 | End: 2022-10-15 | Stop reason: HOSPADM

## 2022-10-08 RX ORDER — ONDANSETRON 2 MG/ML
4 INJECTION INTRAMUSCULAR; INTRAVENOUS EVERY 6 HOURS PRN
Status: DISCONTINUED | OUTPATIENT
Start: 2022-10-08 | End: 2022-10-15 | Stop reason: HOSPADM

## 2022-10-08 RX ORDER — LEVOTHYROXINE SODIUM 0.05 MG/1
50 TABLET ORAL
Status: DISCONTINUED | OUTPATIENT
Start: 2022-10-09 | End: 2022-10-15 | Stop reason: HOSPADM

## 2022-10-08 RX ORDER — ACETAMINOPHEN 325 MG/1
650 TABLET ORAL EVERY 4 HOURS PRN
Status: DISCONTINUED | OUTPATIENT
Start: 2022-10-08 | End: 2022-10-15 | Stop reason: HOSPADM

## 2022-10-08 RX ORDER — FUROSEMIDE 10 MG/ML
80 INJECTION INTRAMUSCULAR; INTRAVENOUS ONCE
Status: COMPLETED | OUTPATIENT
Start: 2022-10-08 | End: 2022-10-08

## 2022-10-08 RX ORDER — ALUMINA, MAGNESIA, AND SIMETHICONE 2400; 2400; 240 MG/30ML; MG/30ML; MG/30ML
15 SUSPENSION ORAL EVERY 6 HOURS PRN
Status: DISCONTINUED | OUTPATIENT
Start: 2022-10-08 | End: 2022-10-15 | Stop reason: HOSPADM

## 2022-10-08 RX ADMIN — FUROSEMIDE 80 MG: 10 INJECTION, SOLUTION INTRAMUSCULAR; INTRAVENOUS at 20:05

## 2022-10-08 RX ADMIN — Medication 10 ML: at 19:39

## 2022-10-08 RX ADMIN — ZIPRASIDONE MESYLATE 10 MG: 20 INJECTION, POWDER, LYOPHILIZED, FOR SOLUTION INTRAMUSCULAR at 20:57

## 2022-10-08 RX ADMIN — LORAZEPAM 1 MG: 2 INJECTION INTRAMUSCULAR; INTRAVENOUS at 22:21

## 2022-10-08 RX ADMIN — METOPROLOL TARTRATE 5 MG: 1 INJECTION, SOLUTION INTRAVENOUS at 19:38

## 2022-10-08 NOTE — ED NOTES
Patient from home via EMS. Reporting increased weakness and bilateral lower extremity edema for past 1-2 weeks. Today he was unable to get up out of a chair without dizziness and shortness of air. Patient reporting intermittent palpitations. No history of Afib.

## 2022-10-08 NOTE — ED PROVIDER NOTES
EMERGENCY DEPARTMENT ENCOUNTER    Room Number:  N445/1  Date of encounter:  10/8/2022  PCP: Beverly Lino MD  Historian: Patient and family members at bedside      HPI:  Chief Complaint: Weakness and confusion, shortness of breath  A complete HPI/ROS/PMH/PSH/SH/FH are unobtainable due to: Poor historian    Context: Rafa Bautista is a 89 y.o. male who presents to the ED c/o 2 to 3-week history of worsening generalized weakness and confusion.  It reached its apex tonight when the patient was too confused and too weak to get up out of his chair on his own.  It is reported that a month ago the patient was very self-sufficient mowing his own lawn.    They also endorse progressively worsening shortness of breath, particular with exertion as well as lower extremity edema.  Patient does not have a history of this and in fact has no previous cardiac history that they are aware of.  In fact he takes very little medication at baseline.    As far as they know he has not been febrile, he has not been complaining of anything other than the shortness of breath, and has not displayed any focal neurologic deficits other than the confusion and generalized weakness.  He has been eating and drinking until today when that has curtailed as well.  There is no nausea or vomiting, no diarrhea, no dysuria, no urinary frequency or urgency and he feels like he is emptying his bladder completely.    The patient is confused, finds it hard to answer questions appropriately although he is oriented to person and place.      PAST MEDICAL HISTORY  Active Ambulatory Problems     Diagnosis Date Noted   • No Active Ambulatory Problems     Resolved Ambulatory Problems     Diagnosis Date Noted   • No Resolved Ambulatory Problems     Past Medical History:   Diagnosis Date   • Hyperlipidemia    • Hypertension          PAST SURGICAL HISTORY  Past Surgical History:   Procedure Laterality Date   • BACK SURGERY           FAMILY HISTORY  History  reviewed. No pertinent family history.      SOCIAL HISTORY  Social History     Socioeconomic History   • Marital status:    Tobacco Use   • Smoking status: Former   Substance and Sexual Activity   • Drug use: No   • Sexual activity: Defer         ALLERGIES  Penicillins        REVIEW OF SYSTEMS  Review of Systems   Limited due to altered mental status      PHYSICAL EXAM    I have reviewed the triage vital signs and nursing notes.    ED Triage Vitals [10/08/22 1906]   Temp Heart Rate Resp BP SpO2   98.3 °F (36.8 °C) 115 22 (!) 183/130 93 %      Temp src Heart Rate Source Patient Position BP Location FiO2 (%)   -- -- -- -- --       Physical Exam  GENERAL: Awake and alert, elderly and confused  HENT: nares patent, NCAT  EYES: no scleral icterus, PERRL, EOMI  CV: Atrial fibrillation  RESPIRATORY: Increased work of breathing with rales in the bases and tachypnea  ABDOMEN: soft, protuberant but nontender and bowel sounds  MUSCULOSKELETAL: 1+ bilateral lower extremity edema  NEURO: alert, moves all extremities, follows commands.  Patient does find difficulty answering questions appropriately, appears to have a labile mood and even a little agitated  SKIN: warm, dry        LAB RESULTS  Recent Results (from the past 24 hour(s))   ECG 12 Lead    Collection Time: 10/08/22  7:17 PM   Result Value Ref Range    QT Interval 312 ms   Comprehensive Metabolic Panel    Collection Time: 10/08/22  7:24 PM    Specimen: Blood   Result Value Ref Range    Glucose 112 (H) 65 - 99 mg/dL    BUN 30 (H) 8 - 23 mg/dL    Creatinine 1.35 (H) 0.76 - 1.27 mg/dL    Sodium 147 (H) 136 - 145 mmol/L    Potassium 3.9 3.5 - 5.2 mmol/L    Chloride 106 98 - 107 mmol/L    CO2 25.6 22.0 - 29.0 mmol/L    Calcium 9.6 8.2 - 9.6 mg/dL    Total Protein 6.5 6.0 - 8.5 g/dL    Albumin 4.00 3.50 - 5.20 g/dL    ALT (SGPT) 31 1 - 41 U/L    AST (SGOT) 33 1 - 40 U/L    Alkaline Phosphatase 82 39 - 117 U/L    Total Bilirubin 2.2 (H) 0.0 - 1.2 mg/dL    Globulin 2.5  gm/dL    A/G Ratio 1.6 g/dL    BUN/Creatinine Ratio 22.2 7.0 - 25.0    Anion Gap 15.4 (H) 5.0 - 15.0 mmol/L    eGFR 49.0 (L) >60.0 mL/min/1.73   Protime-INR    Collection Time: 10/08/22  7:24 PM    Specimen: Blood   Result Value Ref Range    Protime 15.2 (H) 11.7 - 14.2 Seconds    INR 1.19 (H) 0.90 - 1.10   BNP    Collection Time: 10/08/22  7:24 PM    Specimen: Blood   Result Value Ref Range    proBNP 9,434.0 (H) 0.0 - 1,800.0 pg/mL   Troponin    Collection Time: 10/08/22  7:24 PM    Specimen: Blood   Result Value Ref Range    Troponin T 0.027 0.000 - 0.030 ng/mL   Procalcitonin    Collection Time: 10/08/22  7:24 PM    Specimen: Blood   Result Value Ref Range    Procalcitonin 0.06 0.00 - 0.25 ng/mL   Lactic Acid, Plasma    Collection Time: 10/08/22  7:24 PM    Specimen: Blood   Result Value Ref Range    Lactate 2.8 (C) 0.5 - 2.0 mmol/L   CBC Auto Differential    Collection Time: 10/08/22  7:24 PM    Specimen: Blood   Result Value Ref Range    WBC 9.81 3.40 - 10.80 10*3/mm3    RBC 4.70 4.14 - 5.80 10*6/mm3    Hemoglobin 13.2 13.0 - 17.7 g/dL    Hematocrit 40.6 37.5 - 51.0 %    MCV 86.4 79.0 - 97.0 fL    MCH 28.1 26.6 - 33.0 pg    MCHC 32.5 31.5 - 35.7 g/dL    RDW 13.4 12.3 - 15.4 %    RDW-SD 42.4 37.0 - 54.0 fl    MPV 11.4 6.0 - 12.0 fL    Platelets 196 140 - 450 10*3/mm3    Neutrophil % 75.5 42.7 - 76.0 %    Lymphocyte % 11.6 (L) 19.6 - 45.3 %    Monocyte % 11.9 5.0 - 12.0 %    Eosinophil % 0.2 (L) 0.3 - 6.2 %    Basophil % 0.5 0.0 - 1.5 %    Immature Grans % 0.3 0.0 - 0.5 %    Neutrophils, Absolute 7.40 (H) 1.70 - 7.00 10*3/mm3    Lymphocytes, Absolute 1.14 0.70 - 3.10 10*3/mm3    Monocytes, Absolute 1.17 (H) 0.10 - 0.90 10*3/mm3    Eosinophils, Absolute 0.02 0.00 - 0.40 10*3/mm3    Basophils, Absolute 0.05 0.00 - 0.20 10*3/mm3    Immature Grans, Absolute 0.03 0.00 - 0.05 10*3/mm3    nRBC 0.0 0.0 - 0.2 /100 WBC   Magnesium    Collection Time: 10/08/22  7:24 PM    Specimen: Blood   Result Value Ref Range     Magnesium 2.1 1.7 - 2.3 mg/dL   TSH    Collection Time: 10/08/22  7:24 PM    Specimen: Blood   Result Value Ref Range    TSH 4.030 0.270 - 4.200 uIU/mL   T4, Free    Collection Time: 10/08/22  7:24 PM    Specimen: Blood   Result Value Ref Range    Free T4 1.30 0.93 - 1.70 ng/dL   COVID-19,[facility] IN-HOUSE CEPHEID/ANGEL NP SWAB IN TRANSPORT MEDIA 8-12 HR TAT - Swab, Nasopharynx    Collection Time: 10/08/22  7:25 PM    Specimen: Nasopharynx; Swab   Result Value Ref Range    COVID19 Not Detected Not Detected - Ref. Range   Urinalysis With Microscopic If Indicated (No Culture) - Urine, Catheter    Collection Time: 10/08/22  8:58 PM    Specimen: Urine, Catheter   Result Value Ref Range    Color, UA Dark Yellow (A) Yellow, Straw    Appearance, UA Clear Clear    pH, UA <=5.0 5.0 - 8.0    Specific Gravity, UA 1.022 1.005 - 1.030    Glucose, UA Negative Negative    Ketones, UA Negative Negative    Bilirubin, UA Negative Negative    Blood, UA Negative Negative    Protein, UA 30 mg/dL (1+) (A) Negative    Leuk Esterase, UA Small (1+) (A) Negative    Nitrite, UA Negative Negative    Urobilinogen, UA 1.0 E.U./dL 0.2 - 1.0 E.U./dL   Urinalysis, Microscopic Only - Urine, Catheter    Collection Time: 10/08/22  8:58 PM    Specimen: Urine, Catheter   Result Value Ref Range    RBC, UA None Seen None Seen, 0-2 /HPF    WBC, UA 6-12 (A) None Seen, 0-2 /HPF    Bacteria, UA None Seen None Seen /HPF    Squamous Epithelial Cells, UA None Seen None Seen, 0-2 /HPF    Hyaline Casts, UA None Seen None Seen /LPF    Methodology Manual Light Microscopy        Ordered the above labs and independently reviewed the results.        RADIOLOGY  CT Head Without Contrast    Result Date: 10/8/2022  CT HEAD WITHOUT CONTRAST  CLINICAL HISTORY: Altered mental status for 2 weeks.  TECHNIQUE: CT scan of the head was obtained with 3 mm axial soft tissue algorithm and 2 mm bone algorithm images. No intravenous contrast was administered. Sagittal and coronal  reconstructions were obtained.  COMPARISON: CT head dated 06/07/2019.  FINDINGS:   Apparently, the patient was unable to cooperate. The submitted images are nondiagnostic with excessive amount of motion artifact. Apparently, the patient was unable to cooperate for any further imaging. There is no gross evidence for acute intracranial pathology. The ventricles, sulci, and cisterns are age appropriate. Mild changes of chronic small vessel ischemic phenomena are identified.       The submitted images are nondiagnostic due to excessive amount of motion artifact. There is no gross evidence for acute intracranial pathology. The findings as well as the marked limitations of this examination were directly discussed with Dr. Cain on 10/08/2022 at approximately 8:40 PM. I recommend repeat imaging once patient is more able to fully cooperate for the exam.    Radiation dose reduction techniques were utilized, including automated exposure control and exposure modulation based on body size.  This report was finalized on 10/8/2022 9:05 PM by Dr. Fermín Francois M.D.      XR Chest 1 View    Result Date: 10/8/2022  EXAMINATION: SINGLE VIEW CHEST RADIOGRAPH  HISTORY: 89-year-old male with history of shortness of air and weakness.  FINDINGS: An upright AP portable chest radiograph was obtained. No prior chest radiograph is available for comparison. The lungs are low in volume and there is hazy opacification at both lung bases. There is also prominence of the bilateral perihilar pulmonary vasculature. The diaphragm is silhouetted. There is obscuration of the bilateral costophrenic angles.      Low lung volumes with bibasilar atelectasis and/or pneumonia and bilateral pulmonary vascular engorgement versus edema.  This report was finalized on 10/8/2022 8:04 PM by Dr. Yang Hayward M.D.        I ordered the above noted radiological studies. Reviewed by me and discussed with radiologist.  See dictation for official radiology  interpretation.      PROCEDURES    Procedures      MEDICATIONS GIVEN IN ER    Medications   sodium chloride 0.9 % flush 10 mL (10 mL Intravenous Given 10/8/22 1939)   sodium chloride 0.9 % flush 10 mL (has no administration in time range)   nitroglycerin (NITROSTAT) SL tablet 0.4 mg (has no administration in time range)   acetaminophen (TYLENOL) tablet 650 mg (has no administration in time range)     Or   acetaminophen (TYLENOL) 160 MG/5ML solution 650 mg (has no administration in time range)     Or   acetaminophen (TYLENOL) suppository 650 mg (has no administration in time range)   ondansetron (ZOFRAN) tablet 4 mg (has no administration in time range)     Or   ondansetron (ZOFRAN) injection 4 mg (has no administration in time range)   aluminum-magnesium hydroxide-simethicone (MAALOX MAX) 400-400-40 MG/5ML suspension 15 mL (has no administration in time range)   furosemide (LASIX) injection 40 mg (has no administration in time range)   levothyroxine (SYNTHROID, LEVOTHROID) tablet 50 mcg (has no administration in time range)   metoprolol tartrate (LOPRESSOR) injection 5 mg (5 mg Intravenous Given 10/8/22 1938)   furosemide (LASIX) injection 80 mg (80 mg Intravenous Given 10/8/22 2005)   ziprasidone (GEODON) injection 10 mg (10 mg Intramuscular Given 10/8/22 2057)   LORazepam (ATIVAN) injection 1 mg (1 mg Intravenous Given 10/8/22 2221)         PROGRESS, DATA ANALYSIS, CONSULTS, AND MEDICAL DECISION MAKING    All labs have been independently reviewed by me.  All radiology studies have been reviewed by me and discussed with radiologist dictating the report.   EKG's independently viewed and interpreted by me.  Discussion below represents my analysis of pertinent findings related to patient's condition, differential diagnosis, treatment plan and final disposition.        ED Course as of 10/08/22 2337   Sat Oct 08, 2022   2334 CBC is unremarkable, chemistry shows some moderate acute kidney injury and sodium of 147.  I  suspect this is due to decreased p.o. intake [DP]   2334 Lactate is 2.8, however I do not believe this patient is septic and see no source of infection [DP]   2335 Urinalysis is unremarkable [DP]   2335 TSH is normal [DP]   2335 Troponin 0.027 and proBNP is 9300 [DP]   2335 Chest x-ray shows poor inspiratory effort with vascular congestion and the suggestion of pulmonary edema [DP]   2335 Patient's O2 sats are around 90% on room air at rest and he was placed on 2 L nasal cannula.  He is too weak to get up and even stand at the bedside to ambulate and test exertional O2 sat [DP]   2335 EKG at 2003  Atrial fibrillation in the 120s range.  This appears to be new although there is no comparison  There are no acute ST segment changes present to suggest acute ischemia. [DP]   2336 CT scan of the head without contrast is limited due to motion artifact but there are no obvious abnormality [DP]   2336 tPA is not indicated as this appears to have been an insidious progression over the last 2 weeks with no clear onset,, NIH of 0, and presentation more consistent with metabolic cause [DP]   2336 Etiology of his encephalopathy is not clear, but I have treated his atrial for been congestive failure with beta-blocker and Lasix [DP]   2337 I do not believe there is a substance abuse or alcohol related issue based on the history of the family.  He has had a couple of minor falls, but there is no sign of traumatic intracranial injury. [DP]   2337 I wonder if there is perhaps some underlying dementia which is been undiagnosed and is just exacerbated on the circumstances [DP]   2337 I spoke with Dr. Mcwilliams who agrees to admit the patient to a telemetry bed for further evaluation and manage [DP]      ED Course User Index  [DP] Valente Cain MD           PPE: The patient wore a surgical mask throughout the entire patient encounter. I wore an N95.    AS OF 23:37 EDT VITALS:    BP - 122/82  HR - 92  TEMP - 98.1 °F (36.7 °C) (Oral)  O2  SATS - 96%        DIAGNOSIS  Final diagnoses:   Acute metabolic encephalopathy   Atrial fibrillation with RVR (HCC)   Acute pulmonary edema (HCC)   Frequent falls         DISPOSITION  Admit           Valente Cain MD  10/08/22 8854

## 2022-10-09 ENCOUNTER — APPOINTMENT (OUTPATIENT)
Dept: CARDIOLOGY | Facility: HOSPITAL | Age: 87
End: 2022-10-09

## 2022-10-09 LAB
ANION GAP SERPL CALCULATED.3IONS-SCNC: 13.3 MMOL/L (ref 5–15)
BUN SERPL-MCNC: 35 MG/DL (ref 8–23)
BUN/CREAT SERPL: 23.8 (ref 7–25)
CALCIUM SPEC-SCNC: 9.3 MG/DL (ref 8.2–9.6)
CHLORIDE SERPL-SCNC: 105 MMOL/L (ref 98–107)
CHOLEST SERPL-MCNC: 136 MG/DL (ref 0–200)
CO2 SERPL-SCNC: 25.7 MMOL/L (ref 22–29)
CREAT SERPL-MCNC: 1.47 MG/DL (ref 0.76–1.27)
DEPRECATED RDW RBC AUTO: 41.5 FL (ref 37–54)
EGFRCR SERPLBLD CKD-EPI 2021: 44.2 ML/MIN/1.73
ERYTHROCYTE [DISTWIDTH] IN BLOOD BY AUTOMATED COUNT: 13.7 % (ref 12.3–15.4)
FOLATE SERPL-MCNC: 15.8 NG/ML (ref 4.78–24.2)
GLUCOSE SERPL-MCNC: 100 MG/DL (ref 65–99)
HBA1C MFR BLD: 5.6 % (ref 4.8–5.6)
HCT VFR BLD AUTO: 40.6 % (ref 37.5–51)
HDLC SERPL-MCNC: 43 MG/DL (ref 40–60)
HGB BLD-MCNC: 13.6 G/DL (ref 13–17.7)
LDLC SERPL CALC-MCNC: 79 MG/DL (ref 0–100)
LDLC/HDLC SERPL: 1.85 {RATIO}
MAGNESIUM SERPL-MCNC: 2.3 MG/DL (ref 1.7–2.3)
MCH RBC QN AUTO: 28.2 PG (ref 26.6–33)
MCHC RBC AUTO-ENTMCNC: 33.5 G/DL (ref 31.5–35.7)
MCV RBC AUTO: 84.1 FL (ref 79–97)
PLATELET # BLD AUTO: 166 10*3/MM3 (ref 140–450)
PMV BLD AUTO: 11.7 FL (ref 6–12)
POTASSIUM SERPL-SCNC: 3.9 MMOL/L (ref 3.5–5.2)
RBC # BLD AUTO: 4.83 10*6/MM3 (ref 4.14–5.8)
SODIUM SERPL-SCNC: 144 MMOL/L (ref 136–145)
TRIGL SERPL-MCNC: 68 MG/DL (ref 0–150)
VIT B12 BLD-MCNC: 1480 PG/ML (ref 211–946)
VLDLC SERPL-MCNC: 14 MG/DL (ref 5–40)
WBC NRBC COR # BLD: 12.02 10*3/MM3 (ref 3.4–10.8)

## 2022-10-09 PROCEDURE — 82746 ASSAY OF FOLIC ACID SERUM: CPT | Performed by: NURSE PRACTITIONER

## 2022-10-09 PROCEDURE — 36415 COLL VENOUS BLD VENIPUNCTURE: CPT | Performed by: NURSE PRACTITIONER

## 2022-10-09 PROCEDURE — 99222 1ST HOSP IP/OBS MODERATE 55: CPT | Performed by: PSYCHIATRY & NEUROLOGY

## 2022-10-09 PROCEDURE — 80061 LIPID PANEL: CPT | Performed by: INTERNAL MEDICINE

## 2022-10-09 PROCEDURE — 83036 HEMOGLOBIN GLYCOSYLATED A1C: CPT | Performed by: INTERNAL MEDICINE

## 2022-10-09 PROCEDURE — 85027 COMPLETE CBC AUTOMATED: CPT | Performed by: NURSE PRACTITIONER

## 2022-10-09 PROCEDURE — 83735 ASSAY OF MAGNESIUM: CPT | Performed by: INTERNAL MEDICINE

## 2022-10-09 PROCEDURE — 25010000002 FUROSEMIDE PER 20 MG: Performed by: NURSE PRACTITIONER

## 2022-10-09 PROCEDURE — 25010000002 DIGOXIN PER 500 MCG: Performed by: INTERNAL MEDICINE

## 2022-10-09 PROCEDURE — 99222 1ST HOSP IP/OBS MODERATE 55: CPT | Performed by: INTERNAL MEDICINE

## 2022-10-09 PROCEDURE — 82607 VITAMIN B-12: CPT | Performed by: NURSE PRACTITIONER

## 2022-10-09 PROCEDURE — 80048 BASIC METABOLIC PNL TOTAL CA: CPT | Performed by: NURSE PRACTITIONER

## 2022-10-09 RX ORDER — TERAZOSIN 1 MG/1
1 CAPSULE ORAL NIGHTLY
Status: DISCONTINUED | OUTPATIENT
Start: 2022-10-09 | End: 2022-10-15 | Stop reason: HOSPADM

## 2022-10-09 RX ORDER — OLANZAPINE 10 MG/1
5 INJECTION, POWDER, LYOPHILIZED, FOR SOLUTION INTRAMUSCULAR EVERY 4 HOURS PRN
Status: DISCONTINUED | OUTPATIENT
Start: 2022-10-09 | End: 2022-10-11

## 2022-10-09 RX ORDER — OLANZAPINE 10 MG/1
2.5 INJECTION, POWDER, LYOPHILIZED, FOR SOLUTION INTRAMUSCULAR ONCE
Status: COMPLETED | OUTPATIENT
Start: 2022-10-09 | End: 2022-10-09

## 2022-10-09 RX ORDER — OLANZAPINE 10 MG/1
5 INJECTION, POWDER, LYOPHILIZED, FOR SOLUTION INTRAMUSCULAR EVERY 8 HOURS PRN
Status: DISCONTINUED | OUTPATIENT
Start: 2022-10-09 | End: 2022-10-09

## 2022-10-09 RX ORDER — DIGOXIN 0.25 MG/ML
250 INJECTION INTRAMUSCULAR; INTRAVENOUS ONCE
Status: COMPLETED | OUTPATIENT
Start: 2022-10-09 | End: 2022-10-09

## 2022-10-09 RX ORDER — CARVEDILOL 6.25 MG/1
6.25 TABLET ORAL 2 TIMES DAILY WITH MEALS
Status: DISCONTINUED | OUTPATIENT
Start: 2022-10-09 | End: 2022-10-15 | Stop reason: HOSPADM

## 2022-10-09 RX ORDER — LORAZEPAM 2 MG/ML
1 INJECTION INTRAMUSCULAR ONCE
Status: DISCONTINUED | OUTPATIENT
Start: 2022-10-09 | End: 2022-10-09

## 2022-10-09 RX ADMIN — OLANZAPINE 2.5 MG: 10 INJECTION, POWDER, FOR SOLUTION INTRAMUSCULAR at 21:08

## 2022-10-09 RX ADMIN — APIXABAN 5 MG: 5 TABLET, FILM COATED ORAL at 20:47

## 2022-10-09 RX ADMIN — OLANZAPINE 5 MG: 10 INJECTION, POWDER, FOR SOLUTION INTRAMUSCULAR at 11:38

## 2022-10-09 RX ADMIN — DIGOXIN 250 MCG: 0.25 INJECTION INTRAMUSCULAR; INTRAVENOUS at 17:09

## 2022-10-09 RX ADMIN — CARVEDILOL 6.25 MG: 6.25 TABLET, FILM COATED ORAL at 20:48

## 2022-10-09 RX ADMIN — FUROSEMIDE 40 MG: 10 INJECTION, SOLUTION INTRAMUSCULAR; INTRAVENOUS at 10:40

## 2022-10-09 RX ADMIN — FUROSEMIDE 40 MG: 10 INJECTION, SOLUTION INTRAMUSCULAR; INTRAVENOUS at 20:47

## 2022-10-09 RX ADMIN — TERAZOSIN 1 MG: 1 CAPSULE ORAL at 20:47

## 2022-10-09 NOTE — PLAN OF CARE
Goal Outcome Evaluation:      Pt arrived on floor restless, agitated, pulling at lines and trying to climb out of bed. Meds given and pt calmed down. MRI screening sheet faxed. Pt unable to follow commands. He just wants to be left alone. He still is pulling at oxygen tubing and iv line. Unable to take po due to not following commands. Meds held. Afib has been in a controlled rate 90's.

## 2022-10-09 NOTE — ED NOTES
"Nursing report ED to floor  Rafa Bautista  89 y.o.  male    HPI :   Chief Complaint   Patient presents with    Palpitations    Shortness of Breath    Weakness - Generalized       Admitting doctor:   Dayron Hancock MD    Admitting diagnosis:   The primary encounter diagnosis was Acute metabolic encephalopathy. Diagnoses of Atrial fibrillation with RVR (HCC), Acute pulmonary edema (HCC), and Frequent falls were also pertinent to this visit.    Code status:   Current Code Status       Date Active Code Status Order ID Comments User Context       10/8/2022 2058 CPR (Attempt to Resuscitate) [id number]  Crysatl Mayberry, APRN ED        Question Answer    Code Status (Patient has no pulse and is not breathing) CPR (Attempt to Resuscitate)    Medical Interventions (Patient has pulse or is breathing) Full Support                    Allergies:   Penicillins    Isolation:   No active isolations    Intake and Output  No intake or output data in the 24 hours ending 10/08/22 2101    Weight:       10/08/22  1922   Weight: 90.7 kg (200 lb)       Most recent vitals:   Vitals:    10/08/22 1906 10/08/22 1922 10/08/22 2005 10/08/22 2100   BP: (!) 183/130  (!) 162/112 147/94   BP Location:    Right arm   Patient Position:    Sitting   Pulse: 115  100 106   Resp: 22   18   Temp: 98.3 °F (36.8 °C)      SpO2: 93%   97%   Weight:  90.7 kg (200 lb)     Height:  182.9 cm (72\")         Active LDAs/IV Access:   Lines, Drains & Airways       Active LDAs       Name Placement date Placement time Site Days    Peripheral IV 10/08/22 1903 Left Antecubital 10/08/22  1903  Antecubital  less than 1    External Urinary Catheter 10/08/22  2048  --  less than 1                    Labs (abnormal labs have a star):   Labs Reviewed   COMPREHENSIVE METABOLIC PANEL - Abnormal; Notable for the following components:       Result Value    Glucose 112 (*)     BUN 30 (*)     Creatinine 1.35 (*)     Sodium 147 (*)     Total Bilirubin 2.2 (*)     Anion Gap 15.4 " (*)     eGFR 49.0 (*)     All other components within normal limits    Narrative:     GFR Normal >60  Chronic Kidney Disease <60  Kidney Failure <15     PROTIME-INR - Abnormal; Notable for the following components:    Protime 15.2 (*)     INR 1.19 (*)     All other components within normal limits   BNP (IN-HOUSE) - Abnormal; Notable for the following components:    proBNP 9,434.0 (*)     All other components within normal limits    Narrative:     Among patients with dyspnea, NT-proBNP is highly sensitive for the detection of acute congestive heart failure. In addition NT-proBNP of <300 pg/ml effectively rules out acute congestive heart failure with 99% negative predictive value.    Results may be falsely decreased if patient taking Biotin.     LACTIC ACID, PLASMA - Abnormal; Notable for the following components:    Lactate 2.8 (*)     All other components within normal limits   CBC WITH AUTO DIFFERENTIAL - Abnormal; Notable for the following components:    Lymphocyte % 11.6 (*)     Eosinophil % 0.2 (*)     Neutrophils, Absolute 7.40 (*)     Monocytes, Absolute 1.17 (*)     All other components within normal limits   COVID-19,[facility] IN-HOUSE CEPHEID/ANGEL, NP SWAB IN TRANSPORT MEDIA 8-12 HR TAT - Normal    Narrative:     Fact sheet for providers: https://www.fda.gov/media/956457/download     Fact sheet for patients: https://www.fda.gov/media/075610/download   TROPONIN (IN-HOUSE) - Normal    Narrative:     Troponin T Reference Range:  <= 0.03 ng/mL-   Negative for AMI  >0.03 ng/mL-     Abnormal for myocardial necrosis.  Clinicians would have to utilize clinical acumen, EKG, Troponin and serial changes to determine if it is an Acute Myocardial Infarction or myocardial injury due to an underlying chronic condition.       Results may be falsely decreased if patient taking Biotin.     PROCALCITONIN - Normal    Narrative:     As a Marker for Sepsis (Non-Neonates):    1. <0.5 ng/mL represents a low risk of severe sepsis  "and/or septic shock.  2. >2 ng/mL represents a high risk of severe sepsis and/or septic shock.    As a Marker for Lower Respiratory Tract Infections that require antibiotic therapy:    PCT on Admission    Antibiotic Therapy       6-12 Hrs later    >0.5                Strongly Recommended  >0.25 - <0.5        Recommended   0.1 - 0.25          Discouraged              Remeasure/reassess PCT  <0.1                Strongly Discouraged     Remeasure/reassess PCT    As 28 day mortality risk marker: \"Change in Procalcitonin Result\" (>80% or <=80%) if Day 0 (or Day 1) and Day 4 values are available. Refer to http://[email or web address]-pct-calculator.com    Change in PCT <=80%  A decrease of PCT levels below or equal to 80% defines a positive change in PCT test result representing a higher risk for 28-day all-cause mortality of patients diagnosed with severe sepsis for septic shock.    Change in PCT >80%  A decrease of PCT levels of more than 80% defines a negative change in PCT result representing a lower risk for 28-day all-cause mortality of patients diagnosed with severe sepsis or septic shock.      MAGNESIUM - Normal   TSH - Normal   T4, FREE - Normal    Narrative:     Results may be falsely increased if patient taking Biotin.     COVID PRE-OP / PRE-PROCEDURE SCREENING ORDER (NO ISOLATION)    Narrative:     The following orders were created for panel order COVID PRE-OP / PRE-PROCEDURE SCREENING ORDER (NO ISOLATION) - Swab, Nasopharynx.  Procedure                               Abnormality         Status                     ---------                               -----------         ------                     [facility][[id number][facility]  Normal              Final result                 Please view results for these tests on the individual orders.   URINALYSIS W/ MICROSCOPIC IF INDICATED (NO CULTURE)   LACTIC ACID, REFLEX   CBC AND DIFFERENTIAL    Narrative:     The following orders were created for panel order CBC & " Differential.  Procedure                               Abnormality         Status                     ---------                               -----------         ------                     CBC Auto Differential[[id number]]        Abnormal            Final result                 Please view results for these tests on the individual orders.       EKG:   ECG 12 Lead   Final Result   HEART RATE= 117  bpm   RR Interval= 513  ms   SD Interval=   ms   P Horizontal Axis=   deg   P Front Axis=   deg   QRSD Interval= 82  ms   QT Interval= 312  ms   QRS Axis= 12  deg   T Wave Axis= 264  deg   - ABNORMAL ECG -   Atrial fibrillation   Multiple ventricular premature complexes   Anterior infarct, old   Nonspecific repol abnormality, inferior leads   Poor quality tracing, repeat.   Electronically Signed By: Nicholas GleasonIVORY) ([facility]) 08-Oct-2022 20:03:10   Date and Time of Study: 2022-10-08 19:17:44          Meds given in ED:   Medications   sodium chloride 0.9 % flush 10 mL (10 mL Intravenous Given 10/8/22 1939)   sodium chloride 0.9 % flush 10 mL (has no administration in time range)   nitroglycerin (NITROSTAT) SL tablet 0.4 mg (has no administration in time range)   acetaminophen (TYLENOL) tablet 650 mg (has no administration in time range)     Or   acetaminophen (TYLENOL) 160 MG/5ML solution 650 mg (has no administration in time range)     Or   acetaminophen (TYLENOL) suppository 650 mg (has no administration in time range)   ondansetron (ZOFRAN) tablet 4 mg (has no administration in time range)     Or   ondansetron (ZOFRAN) injection 4 mg (has no administration in time range)   aluminum-magnesium hydroxide-simethicone (MAALOX MAX) 400-400-40 MG/5ML suspension 15 mL (has no administration in time range)   LORazepam (ATIVAN) injection 1 mg (has no administration in time range)   furosemide (LASIX) injection 40 mg (has no administration in time range)   metoprolol tartrate (LOPRESSOR) injection 5 mg (5 mg Intravenous Given  10/8/22 1938)   furosemide (LASIX) injection 80 mg (80 mg Intravenous Given 10/8/22 2005)   ziprasidone (GEODON) injection 10 mg (10 mg Intramuscular Given 10/8/22 2057)       Imaging results:  XR Chest 1 View    Result Date: 10/8/2022  Low lung volumes with bibasilar atelectasis and/or pneumonia and bilateral pulmonary vascular engorgement versus edema.  This report was finalized on 10/8/2022 8:04 PM by Dr. Yang Hayward M.D.       Ambulatory status:   - bedrest     Social issues:   Social History     Socioeconomic History    Marital status:    Tobacco Use    Smoking status: Former   Substance and Sexual Activity    Drug use: No    Sexual activity: Defer       NIH Stroke Scale:         Olivia Lake RN  10/08/22 21:01 EDT

## 2022-10-09 NOTE — H&P
Patient Name:  Rafa Bautista  YOB: 1928  MRN:  [id number]  Admit Date:  10/8/2022  Patient Care Team:  Beverly Lino MD as PCP - General (Internal Medicine)      Subjective   History Present Illness     Chief Complaint   Patient presents with   • Palpitations   • Shortness of Breath   • Weakness - Generalized     History of Present Illness   Mr. Bautista is a 89 y.o. former smoker with a history of BPH and hypothyroidism that presents to [facility] due to weakness, confusion, and shortness of breath.  HPI has been obtained by family at bedside.  Patient resides at home with his wife and for the last 2 to 3 weeks, they have noticed worsening confusion, weakness, and shortness of breath.  Son at bedside reports that approximately a month ago he was self-sufficient and was able to mow his own yard.  They have noted shortness of breath with exertion as well as lower extremity edema.  They report no previous cardiac history.  Wife denies any recent diarrhea or complaints of abdominal pain.  They have not noticed any fever or heard him complain of any chills.      In the emergency department, he became very agitated and received Geodon.  CT of the head was performed showed no evidence of acute intracranial pathology but was limited due to excessive motion.  Chest x-ray showed bibasilar atelectasis and/or pneumonia and bilateral pulmonary vascular engorgement versus edema.  Labs obtained show a proBNP of 9434, glucose 112, sodium 147, anion gap 15.4, creat 1.35, BUN 30, lactate 2.8, and procalcitonin 0.06.  Urinalysis shows 6-12 whites.  Upon presentation, he was found to be in atrial fibrillation.  We were asked to admit the patient for new onset congestive heart failure as well as new onset atrial fibrillation.    Review of Systems   Unable to perform ROS: Mental status change        Personal History     Past Medical History:   Diagnosis Date   • Hyperlipidemia    •  Hypertension      Past Surgical History:   Procedure Laterality Date   • BACK SURGERY       History reviewed. No pertinent family history.  Social History     Tobacco Use   • Smoking status: Former   Substance Use Topics   • Drug use: No     No current facility-administered medications on file prior to encounter.     Current Outpatient Medications on File Prior to Encounter   Medication Sig Dispense Refill   • levothyroxine (SYNTHROID, LEVOTHROID) 50 MCG tablet Take 1 tablet by mouth Every Morning.     • terazosin (HYTRIN) 1 MG capsule Take 1 mg by mouth Every Night.       Allergies   Allergen Reactions   • Penicillins        Objective    Objective     Vital Signs  Temp:  [98.1 °F (36.7 °C)-98.3 °F (36.8 °C)] 98.1 °F (36.7 °C)  Heart Rate:  [] 92  Resp:  [16-22] 16  BP: (110-183)/() 122/82  SpO2:  [90 %-97 %] 96 %  on  Flow (L/min):  [0-3] 3;   Device (Oxygen Therapy): nasal cannula  Body mass index is 27.12 kg/m².    Physical Exam  Vitals and nursing note reviewed.   Constitutional:       General: He is sleeping. He is not in acute distress.     Appearance: He is well-developed. He is obese. He is not toxic-appearing.      Comments: Appears stated age   HENT:      Head: Normocephalic and atraumatic.   Eyes:      General: No scleral icterus.        Right eye: No discharge.         Left eye: No discharge.      Conjunctiva/sclera: Conjunctivae normal.   Neck:      Vascular: No JVD.   Cardiovascular:      Rate and Rhythm: Normal rate. Rhythm irregularly irregular.      Heart sounds: Normal heart sounds. No murmur heard.    No friction rub. No gallop.   Pulmonary:      Effort: Pulmonary effort is normal. No respiratory distress.      Breath sounds: Normal breath sounds. No wheezing or rales.   Abdominal:      General: Bowel sounds are normal. There is no distension.      Palpations: Abdomen is soft.      Tenderness: There is no abdominal tenderness. There is no guarding.   Musculoskeletal:         General:  No tenderness or deformity. Normal range of motion.      Cervical back: Normal range of motion and neck supple.      Right lower le+ Edema present.      Left lower le+ Edema present.   Skin:     General: Skin is warm and dry.      Capillary Refill: Capillary refill takes less than 2 seconds.   Neurological:      Comments: Sleeping, just received Ativan     Psychiatric:         Cognition and Memory: Cognition is impaired.       Results Review:  I reviewed the patient's new clinical results.  I reviewed the patient's new imaging results and agree with the interpretation.  I reviewed the patient's other test results and agree with the interpretation  I personally viewed and interpreted the patient's EKG/Telemetry data  Discussed with ED provider.    Lab Results (last 24 hours)     Procedure Component Value Units Date/Time    CBC & Differential [[id number]]  (Abnormal) Collected: 10/08/22 1924    Specimen: Blood Updated: 10/08/22 1939    Narrative:      The following orders were created for panel order CBC & Differential.  Procedure                               Abnormality         Status                     ---------                               -----------         ------                     CBC Auto Differential[[id number]]        Abnormal            Final result                 Please view results for these tests on the individual orders.    Comprehensive Metabolic Panel [[id number]]  (Abnormal) Collected: 10/08/22 1924    Specimen: Blood Updated: 10/08/22 2001     Glucose 112 mg/dL      BUN 30 mg/dL      Creatinine 1.35 mg/dL      Sodium 147 mmol/L      Potassium 3.9 mmol/L      Chloride 106 mmol/L      CO2 25.6 mmol/L      Calcium 9.6 mg/dL      Total Protein 6.5 g/dL      Albumin 4.00 g/dL      ALT (SGPT) 31 U/L      AST (SGOT) 33 U/L      Alkaline Phosphatase 82 U/L      Total Bilirubin 2.2 mg/dL      Globulin 2.5 gm/dL      A/G Ratio 1.6 g/dL      BUN/Creatinine Ratio 22.2     Anion Gap 15.4 mmol/L       eGFR 49.0 mL/min/1.73      Comment: National Kidney Foundation and American Society of Nephrology (ASN) Task Force recommended calculation based on the Chronic Kidney Disease Epidemiology Collaboration (CKD-EPI) equation refit without adjustment for race.       Narrative:      GFR Normal >60  Chronic Kidney Disease <60  Kidney Failure <15      Protime-INR [[id number]]  (Abnormal) Collected: 10/08/22 1924    Specimen: Blood Updated: 10/08/22 1948     Protime 15.2 Seconds      INR 1.19    BNP [[id number]]  (Abnormal) Collected: 10/08/22 1924    Specimen: Blood Updated: 10/08/22 2008     proBNP 9,434.0 pg/mL     Narrative:      Among patients with dyspnea, NT-proBNP is highly sensitive for the detection of acute congestive heart failure. In addition NT-proBNP of <300 pg/ml effectively rules out acute congestive heart failure with 99% negative predictive value.    Results may be falsely decreased if patient taking Biotin.      Troponin [[id number]]  (Normal) Collected: 10/08/22 1924    Specimen: Blood Updated: 10/08/22 2008     Troponin T 0.027 ng/mL     Narrative:      Troponin T Reference Range:  <= 0.03 ng/mL-   Negative for AMI  >0.03 ng/mL-     Abnormal for myocardial necrosis.  Clinicians would have to utilize clinical acumen, EKG, Troponin and serial changes to determine if it is an Acute Myocardial Infarction or myocardial injury due to an underlying chronic condition.       Results may be falsely decreased if patient taking Biotin.      Procalcitonin [[id number]]  (Normal) Collected: 10/08/22 1924    Specimen: Blood Updated: 10/08/22 2008     Procalcitonin 0.06 ng/mL     Narrative:      As a Marker for Sepsis (Non-Neonates):    1. <0.5 ng/mL represents a low risk of severe sepsis and/or septic shock.  2. >2 ng/mL represents a high risk of severe sepsis and/or septic shock.    As a Marker for Lower Respiratory Tract Infections that require antibiotic therapy:    PCT on Admission    Antibiotic Therapy       6-12 Hrs  "later    >0.5                Strongly Recommended  >0.25 - <0.5        Recommended   0.1 - 0.25          Discouraged              Remeasure/reassess PCT  <0.1                Strongly Discouraged     Remeasure/reassess PCT    As 28 day mortality risk marker: \"Change in Procalcitonin Result\" (>80% or <=80%) if Day 0 (or Day 1) and Day 4 values are available. Refer to http://www.[facility]-pct-calculator.com    Change in PCT <=80%  A decrease of PCT levels below or equal to 80% defines a positive change in PCT test result representing a higher risk for 28-day all-cause mortality of patients diagnosed with severe sepsis for septic shock.    Change in PCT >80%  A decrease of PCT levels of more than 80% defines a negative change in PCT result representing a lower risk for 28-day all-cause mortality of patients diagnosed with severe sepsis or septic shock.       Lactic Acid, Plasma [[id number]]  (Abnormal) Collected: 10/08/22 1924    Specimen: Blood Updated: 10/08/22 2016     Lactate 2.8 mmol/L     CBC Auto Differential [[id number]]  (Abnormal) Collected: 10/08/22 1924    Specimen: Blood Updated: 10/08/22 1939     WBC 9.81 10*3/mm3      RBC 4.70 10*6/mm3      Hemoglobin 13.2 g/dL      Hematocrit 40.6 %      MCV 86.4 fL      MCH 28.1 pg      MCHC 32.5 g/dL      RDW 13.4 %      RDW-SD 42.4 fl      MPV 11.4 fL      Platelets 196 10*3/mm3      Neutrophil % 75.5 %      Lymphocyte % 11.6 %      Monocyte % 11.9 %      Eosinophil % 0.2 %      Basophil % 0.5 %      Immature Grans % 0.3 %      Neutrophils, Absolute 7.40 10*3/mm3      Lymphocytes, Absolute 1.14 10*3/mm3      Monocytes, Absolute 1.17 10*3/mm3      Eosinophils, Absolute 0.02 10*3/mm3      Basophils, Absolute 0.05 10*3/mm3      Immature Grans, Absolute 0.03 10*3/mm3      nRBC 0.0 /100 WBC     Magnesium [[id number]]  (Normal) Collected: 10/08/22 1924    Specimen: Blood Updated: 10/08/22 2006     Magnesium 2.1 mg/dL     TSH [[id number]]  (Normal) Collected: 10/08/22 1924    " Specimen: Blood Updated: 10/08/22 2029     TSH 4.030 uIU/mL     T4, Free [[id number]]  (Normal) Collected: 10/08/22 1924    Specimen: Blood Updated: 10/08/22 2029     Free T4 1.30 ng/dL     Narrative:      Results may be falsely increased if patient taking Biotin.      COVID PRE-OP / PRE-PROCEDURE SCREENING ORDER (NO ISOLATION) - Swab, Nasopharynx [[id number]]  (Normal) Collected: 10/08/22 1925    Specimen: Swab from Nasopharynx Updated: 10/08/22 2013    Narrative:      The following orders were created for panel order COVID PRE-OP / PRE-PROCEDURE SCREENING ORDER (NO ISOLATION) - Swab, Nasopharynx.  Procedure                               Abnormality         Status                     ---------                               -----------         ------                     COVID-19,[facility] IN-HOUSE...[[id number]]  Normal              Final result                 Please view results for these tests on the individual orders.    COVID-19,[facility]-HOUSE CEPHEID/ANGEL NP SWAB IN TRANSPORT MEDIA 8-12 HR TAT - Swab, Nasopharynx [[id number]]  (Normal) Collected: 10/08/22 1925    Specimen: Swab from Nasopharynx Updated: 10/08/22 2013     COVID19 Not Detected    Narrative:      Fact sheet for providers: https://www.fda.gov/media/158557/download     Fact sheet for patients: https://www.fda.gov/media/607662/download    Urinalysis With Microscopic If Indicated (No Culture) - Urine, Catheter [[id number]]  (Abnormal) Collected: 10/08/22 2058    Specimen: Urine, Catheter Updated: 10/08/22 2122     Color, UA Dark Yellow     Appearance, UA Clear     pH, UA <=5.0     Specific Gravity, UA 1.022     Glucose, UA Negative     Ketones, UA Negative     Bilirubin, UA Negative     Blood, UA Negative     Protein, UA 30 mg/dL (1+)     Leuk Esterase, UA Small (1+)     Nitrite, UA Negative     Urobilinogen, UA 1.0 E.U./dL    Urinalysis, Microscopic Only - Urine, Catheter [[id number]]  (Abnormal) Collected: 10/08/22 2058    Specimen: Urine, Catheter  Updated: 10/08/22 2147     RBC, UA None Seen /HPF      WBC, UA 6-12 /HPF      Bacteria, UA None Seen /HPF      Squamous Epithelial Cells, UA None Seen /HPF      Hyaline Casts, UA None Seen /LPF      Methodology Manual Light Microscopy          Imaging Results (Last 24 Hours)     Procedure Component Value Units Date/Time    CT Head Without Contrast [[id number]] Collected: 10/08/22 2101     Updated: 10/08/22 2108    Narrative:      CT HEAD WITHOUT CONTRAST     CLINICAL HISTORY: Altered mental status for 2 weeks.     TECHNIQUE: CT scan of the head was obtained with 3 mm axial soft tissue  algorithm and 2 mm bone algorithm images. No intravenous contrast was  administered. Sagittal and coronal reconstructions were obtained.     COMPARISON: CT head dated 06/07/2019.     FINDINGS:       Apparently, the patient was unable to cooperate. The submitted images  are nondiagnostic with excessive amount of motion artifact. Apparently,  the patient was unable to cooperate for any further imaging. There is no  gross evidence for acute intracranial pathology. The ventricles, sulci,  and cisterns are age appropriate. Mild changes of chronic small vessel  ischemic phenomena are identified.       Impression:         The submitted images are nondiagnostic due to excessive amount of motion  artifact. There is no gross evidence for acute intracranial pathology.  The findings as well as the marked limitations of this examination were  directly discussed with Dr. Cain on 10/08/2022 at approximately 8:40  PM. I recommend repeat imaging once patient is more able to fully  cooperate for the exam.           Radiation dose reduction techniques were utilized, including automated  exposure control and exposure modulation based on body size.     This report was finalized on 10/8/2022 9:05 PM by Dr. Fermín Francois M.D.       XR Chest 1 View [[id number]] Collected: 10/08/22 1958     Updated: 10/08/22 2007    Narrative:      EXAMINATION: SINGLE VIEW  CHEST RADIOGRAPH     HISTORY: 89-year-old male with history of shortness of air and weakness.     FINDINGS: An upright AP portable chest radiograph was obtained. No prior  chest radiograph is available for comparison. The lungs are low in  volume and there is hazy opacification at both lung bases. There is also  prominence of the bilateral perihilar pulmonary vasculature. The  diaphragm is silhouetted. There is obscuration of the bilateral  costophrenic angles.       Impression:      Low lung volumes with bibasilar atelectasis and/or pneumonia  and bilateral pulmonary vascular engorgement versus edema.     This report was finalized on 10/8/2022 8:04 PM by Dr. Yang Hayward M.D.                 ECG 12 Lead   Final Result   HEART RATE= 117  bpm   RR Interval= 513  ms   NV Interval=   ms   P Horizontal Axis=   deg   P Front Axis=   deg   QRSD Interval= 82  ms   QT Interval= 312  ms   QRS Axis= 12  deg   T Wave Axis= 264  deg   - ABNORMAL ECG -   Atrial fibrillation   Multiple ventricular premature complexes   Anterior infarct, old   Nonspecific repol abnormality, inferior leads   Poor quality tracing, repeat.   Electronically Signed By: Nicholas GleasonIVORY) ([facility]) 08-Oct-2022 20:03:10   Date and Time of Study: 2022-10-08 19:17:44           Assessment/Plan     Active Hospital Problems    Diagnosis  POA   • **New onset atrial fibrillation (HCC) [I48.91]  Yes   • Acute metabolic encephalopathy [G93.41]  Yes   • CHF (congestive heart failure) (HCC) [I50.9]  Yes   • Hypothyroidism [E03.9]  Yes   • BPH (benign prostatic hyperplasia) [N40.0]  Yes      Resolved Hospital Problems   No resolved problems to display.       Mr. Bautista is a 89 y.o. former smoker with a history of BPH and hypothyroidism who presents with altered mental status, generalized weakness, and dyspnea.    Acute metabolic encephalopathy  -Patient received Geodon in the ED with little relief.  Upon my assessment, he also received a dose of IV Ativan that  was ordered prior to MRI but was given anyway.  He is resting now.  Vital signs are stable.  -MRI in a.m.  -Check RPR, B12, and folate in a.m.  -Neurology consultation    New onset congestive heart failure  -IV Lasix q12  -Cardiology consultation  -2D echo in a.m.  -Daily weights  -Strict I's and O    New onset atrial fibrillation  -No prior history, rate controlled  -Cardiology consultation    Hypothyroidism   -TSH 4, free T4 1.30  -Resume Synthroid therapy    BPH  -Resume terazosin once patient is able to tolerate p.o.      I discussed the patient's findings and my recommendations with family, nursing staff and Dr. Hancock.    VTE Prophylaxis - SCDs.  Code Status - Full code.       ALEJANDRO Epps  [facility]  10/8/2022  22:00 EDT

## 2022-10-09 NOTE — CONSULTS
Patient Identification:  NAME:  Rafa Bautista  Age:  89 y.o.   Sex:  male   :  1928   MRN:  [id number]       Chief complaint: He does not have one, reason for consult change in mental status, agitation and confusion    History of present illness: Patient is a 89-year-old right-handed white male with history of hyperlipidemia hypertension who comes to the hospital after about 2 or 3 weeks of increasing weakness and confusion.  Duration 2 or 3weeks.  Quality weakness and confusion over the last few days he became very confused and yesterday became combative with additional symptoms associated symptoms of hallucinations.  Modifying factors he got some Ativan that did not seem to help but eventually put him to sleep.  He is still sleepy today.  He does have an associated symptom of new onset A. fib and he is congestive heart failure.  Location is not pertinent duration as noted quality as described.  CT of the head shows chronic changes by my independent eyeball review only  He has had dizziness and shortness of breath over the last few weeks making it hard to get out of a chair    Past medical history:  Past Medical History:   Diagnosis Date   • Hyperlipidemia    • Hypertension        Past surgical history:  Past Surgical History:   Procedure Laterality Date   • BACK SURGERY         Allergies:  Penicillins    Home medications:  Medications Prior to Admission   Medication Sig Dispense Refill Last Dose   • levothyroxine (SYNTHROID, LEVOTHROID) 50 MCG tablet Take 1 tablet by mouth Every Morning.   10/8/2022   • terazosin (HYTRIN) 1 MG capsule Take 1 mg by mouth Every Night.   10/8/2022        Hospital medications:  apixaban, 5 mg, Oral, Q12H  carvedilol, 6.25 mg, Oral, BID With Meals  furosemide, 40 mg, Intravenous, Q12H  levothyroxine, 50 mcg, Oral, Q AM  terazosin, 1 mg, Oral, Nightly         •  acetaminophen **OR** acetaminophen **OR** acetaminophen  •  aluminum-magnesium hydroxide-simethicone  •   metoprolol tartrate  •  nitroglycerin  •  OLANZapine  •  ondansetron **OR** ondansetron  •  [COMPLETED] Insert peripheral IV **AND** sodium chloride  •  sodium chloride    Family history:  History reviewed. No pertinent family history.    Social history:  Social History     Tobacco Use   • Smoking status: Former   Substance Use Topics   • Drug use: No       Review of systems:      He cannot give any review of systems family notes he is got more confused over the last 2 to 3 weeks hallucinating seeing things yesterday getting more agitated and swinging at his relatives yesterday.  No other review of systems possible.  I reviewed the chart fully for the review of systems  Objective:  Vitals Ranges:   Temp:  [98.1 °F (36.7 °C)-98.3 °F (36.8 °C)] 98.1 °F (36.7 °C)  Heart Rate:  [] 86  Resp:  [16-22] 16  BP: (110-183)/() 145/111      Physical Exam:  Lethargic.  He gives a brief one-word type answer but only gives me the first syllable.  He cannot answer questions of orientation fund of knowledge attention span concentration recent remote memory cannot be tested.  He is in no distress and is turned over on his right side sleeping.  I awaken him.  He resist eye opening.  Pupils 3 constricting to 2-1/2 eyes are conjugate possible decreased left nasolabial fold but he is laying on his right side and his face is twisted a little bit that direction.  No other cranial nerves could be performed.  He pulls up his sheets with both hands and withdraws both feet to stimulation so I think he is moving all extremities equally.  No atrophy or fasciculations.  No rigidity no resting tremor reflexes trace toes downgoing bilaterally sensation coordination Station and gait completely impossible.  Heart is regular without murmur neck supple without bruits extremities no clubbing cyanosis edema visual acuity he did not blink to threat but basically keeps his eyes closed  Results review:   I reviewed the patient's new clinical  results.    Data review:  Lab Results (last 24 hours)     Procedure Component Value Units Date/Time    Basic Metabolic Panel [[id number]]  (Abnormal) Collected: 10/09/22 1014    Specimen: Blood Updated: 10/09/22 1115     Glucose 100 mg/dL      BUN 35 mg/dL      Creatinine 1.47 mg/dL      Sodium 144 mmol/L      Potassium 3.9 mmol/L      Comment: Slight hemolysis detected by analyzer. Results may be affected.        Chloride 105 mmol/L      CO2 25.7 mmol/L      Calcium 9.3 mg/dL      BUN/Creatinine Ratio 23.8     Anion Gap 13.3 mmol/L      eGFR 44.2 mL/min/1.73      Comment: National Kidney Foundation and American Society of Nephrology (ASN) Task Force recommended calculation based on the Chronic Kidney Disease Epidemiology Collaboration (CKD-EPI) equation refit without adjustment for race.       Narrative:      GFR Normal >60  Chronic Kidney Disease <60  Kidney Failure <15      Vitamin B12 [[id number]]  (Abnormal) Collected: 10/09/22 0432    Specimen: Blood Updated: 10/09/22 0724     Vitamin B-12 1,480 pg/mL     Narrative:      Results may be falsely increased if patient taking Biotin.      Folate [[id number]]  (Normal) Collected: 10/09/22 0432    Specimen: Blood Updated: 10/09/22 0724     Folate 15.80 ng/mL     Narrative:      Results may be falsely increased if patient taking Biotin.      Lipid Panel [[id number]] Collected: 10/09/22 0432    Specimen: Blood Updated: 10/09/22 0707     Total Cholesterol 136 mg/dL      Triglycerides 68 mg/dL      HDL Cholesterol 43 mg/dL      LDL Cholesterol  79 mg/dL      VLDL Cholesterol 14 mg/dL      LDL/HDL Ratio 1.85    Narrative:      Cholesterol Reference Ranges  (U.S. Department of Health and Human Services ATP III Classifications)    Desirable          <200 mg/dL  Borderline High    200-239 mg/dL  High Risk          >240 mg/dL      Triglyceride Reference Ranges  (U.S. Department of Health and Human Services ATP III Classifications)    Normal           <150 mg/dL  Borderline High   150-199 mg/dL  High             200-499 mg/dL  Very High        >500 mg/dL    HDL Reference Ranges  (U.S. Department of Health and Human Services ATP III Classifications)    Low     <40 mg/dl (major risk factor for CHD)  High    >60 mg/dl ('negative' risk factor for CHD)        LDL Reference Ranges  (U.S. Department of Health and Human Services ATP III Classifications)    Optimal          <100 mg/dL  Near Optimal     100-129 mg/dL  Borderline High  130-159 mg/dL  High             160-189 mg/dL  Very High        >189 mg/dL    Magnesium [[id number]]  (Normal) Collected: 10/09/22 0432    Specimen: Blood Updated: 10/09/22 0707     Magnesium 2.3 mg/dL     Hemoglobin A1c [[id number]]  (Normal) Collected: 10/09/22 0432    Specimen: Blood Updated: 10/09/22 0702     Hemoglobin A1C 5.60 %     Narrative:      Hemoglobin A1C Ranges:    Increased Risk for Diabetes  5.7% to 6.4%  Diabetes                     >= 6.5%  Diabetic Goal                < 7.0%    CBC (No Diff) [[id number]]  (Abnormal) Collected: 10/09/22 0432    Specimen: Blood Updated: 10/09/22 0651     WBC 12.02 10*3/mm3      RBC 4.83 10*6/mm3      Hemoglobin 13.6 g/dL      Hematocrit 40.6 %      MCV 84.1 fL      MCH 28.2 pg      MCHC 33.5 g/dL      RDW 13.7 %      RDW-SD 41.5 fl      MPV 11.7 fL      Platelets 166 10*3/mm3     Urinalysis, Microscopic Only - Urine, Catheter [[id number]]  (Abnormal) Collected: 10/08/22 2058    Specimen: Urine, Catheter Updated: 10/08/22 2147     RBC, UA None Seen /HPF      WBC, UA 6-12 /HPF      Bacteria, UA None Seen /HPF      Squamous Epithelial Cells, UA None Seen /HPF      Hyaline Casts, UA None Seen /LPF      Methodology Manual Light Microscopy    Urinalysis With Microscopic If Indicated (No Culture) - Urine, Catheter [[id number]]  (Abnormal) Collected: 10/08/22 2058    Specimen: Urine, Catheter Updated: 10/08/22 2122     Color, UA Dark Yellow     Appearance, UA Clear     pH, UA <=5.0     Specific Gravity, UA 1.022     Glucose, UA  Negative     Ketones, UA Negative     Bilirubin, UA Negative     Blood, UA Negative     Protein, UA 30 mg/dL (1+)     Leuk Esterase, UA Small (1+)     Nitrite, UA Negative     Urobilinogen, UA 1.0 E.U./dL    TSH [[id number]]  (Normal) Collected: 10/08/22 1924    Specimen: Blood Updated: 10/08/22 2029     TSH 4.030 uIU/mL     T4, Free [[id number]]  (Normal) Collected: 10/08/22 1924    Specimen: Blood Updated: 10/08/22 2029     Free T4 1.30 ng/dL     Narrative:      Results may be falsely increased if patient taking Biotin.      Lactic Acid, Plasma [[id number]]  (Abnormal) Collected: 10/08/22 1924    Specimen: Blood Updated: 10/08/22 2016     Lactate 2.8 mmol/L     COVID PRE-OP / PRE-PROCEDURE SCREENING ORDER (NO ISOLATION) - Swab, Nasopharynx [[id number]]  (Normal) Collected: 10/08/22 1925    Specimen: Swab from Nasopharynx Updated: 10/08/22 2013    Narrative:      The following orders were created for panel order COVID PRE-OP / PRE-PROCEDURE SCREENING ORDER (NO ISOLATION) - Swab, Nasopharynx.  Procedure                               Abnormality         Status                     ---------                               -----------         ------                     COVID-19,[facility]HOU[facility]...[[id number]]  Normal              Final result                 Please view results for these tests on the individual orders.    COVID-19,[facility] CEPHEID/ANGEL NP SWAB IN TRANSPORT MEDIA 8-12 HR TAT - Swab, Nasopharynx [[id number]]  (Normal) Collected: 10/08/22 1925    Specimen: Swab from Nasopharynx Updated: 10/08/22 2013     COVID19 Not Detected    Narrative:      Fact sheet for providers: https://www.fda.gov/media/080282/download     Fact sheet for patients: https://www.fda.gov/media/616600/download    Procalcitonin [[id number]]  (Normal) Collected: 10/08/22 1924    Specimen: Blood Updated: 10/08/22 2008     Procalcitonin 0.06 ng/mL     Narrative:      As a Marker for Sepsis (Non-Neonates):    1. <0.5 ng/mL represents a low  "risk of severe sepsis and/or septic shock.  2. >2 ng/mL represents a high risk of severe sepsis and/or septic shock.    As a Marker for Lower Respiratory Tract Infections that require antibiotic therapy:    PCT on Admission    Antibiotic Therapy       6-12 Hrs later    >0.5                Strongly Recommended  >0.25 - <0.5        Recommended   0.1 - 0.25          Discouraged              Remeasure/reassess PCT  <0.1                Strongly Discouraged     Remeasure/reassess PCT    As 28 day mortality risk marker: \"Change in Procalcitonin Result\" (>80% or <=80%) if Day 0 (or Day 1) and Day 4 values are available. Refer to http://[email or web address] [email or web address]pct-[email or web address]    Change in PCT <=80%  A decrease of PCT levels below or equal to 80% defines a positive change in PCT test result representing a higher risk for 28-day all-cause mortality of patients diagnosed with severe sepsis for septic shock.    Change in PCT >80%  A decrease of PCT levels of more than 80% defines a negative change in PCT result representing a lower risk for 28-day all-cause mortality of patients diagnosed with severe sepsis or septic shock.       BNP [[id number]]  (Abnormal) Collected: 10/08/22 1924    Specimen: Blood Updated: 10/08/22 2008     proBNP 9,434.0 pg/mL     Narrative:      Among patients with dyspnea, NT-proBNP is highly sensitive for the detection of acute congestive heart failure. In addition NT-proBNP of <300 pg/ml effectively rules out acute congestive heart failure with 99% negative predictive value.    Results may be falsely decreased if patient taking Biotin.      Troponin [[id number]]  (Normal) Collected: 10/08/22 1924    Specimen: Blood Updated: 10/08/22 2008     Troponin T 0.027 ng/mL     Narrative:      Troponin T Reference Range:  <= 0.03 ng/mL-   Negative for AMI  >0.03 ng/mL-     Abnormal for myocardial necrosis.  Clinicians would have to utilize clinical acumen, EKG, Troponin and serial changes to determine if it is an Acute " Myocardial Infarction or myocardial injury due to an underlying chronic condition.       Results may be falsely decreased if patient taking Biotin.      Magnesium [[id number]]  (Normal) Collected: 10/08/22 1924    Specimen: Blood Updated: 10/08/22 2006     Magnesium 2.1 mg/dL     Comprehensive Metabolic Panel [[id number]]  (Abnormal) Collected: 10/08/22 1924    Specimen: Blood Updated: 10/08/22 2001     Glucose 112 mg/dL      BUN 30 mg/dL      Creatinine 1.35 mg/dL      Sodium 147 mmol/L      Potassium 3.9 mmol/L      Chloride 106 mmol/L      CO2 25.6 mmol/L      Calcium 9.6 mg/dL      Total Protein 6.5 g/dL      Albumin 4.00 g/dL      ALT (SGPT) 31 U/L      AST (SGOT) 33 U/L      Alkaline Phosphatase 82 U/L      Total Bilirubin 2.2 mg/dL      Globulin 2.5 gm/dL      A/G Ratio 1.6 g/dL      BUN/Creatinine Ratio 22.2     Anion Gap 15.4 mmol/L      eGFR 49.0 mL/min/1.73      Comment: National Kidney Foundation and American Society of Nephrology (ASN) Task Force recommended calculation based on the Chronic Kidney Disease Epidemiology Collaboration (CKD-EPI) equation refit without adjustment for race.       Narrative:      GFR Normal >60  Chronic Kidney Disease <60  Kidney Failure <15      Protime-INR [[id number]]  (Abnormal) Collected: 10/08/22 1924    Specimen: Blood Updated: 10/08/22 1948     Protime 15.2 Seconds      INR 1.19    CBC & Differential [[id number]]  (Abnormal) Collected: 10/08/22 1924    Specimen: Blood Updated: 10/08/22 1939    Narrative:      The following orders were created for panel order CBC & Differential.  Procedure                               Abnormality         Status                     ---------                               -----------         ------                     CBC Auto Differential[[id number]]        Abnormal            Final result                 Please view results for these tests on the individual orders.    CBC Auto Differential [[id number]]  (Abnormal) Collected: 10/08/22 1924     Specimen: Blood Updated: 10/08/22 1939     WBC 9.81 10*3/mm3      RBC 4.70 10*6/mm3      Hemoglobin 13.2 g/dL      Hematocrit 40.6 %      MCV 86.4 fL      MCH 28.1 pg      MCHC 32.5 g/dL      RDW 13.4 %      RDW-SD 42.4 fl      MPV 11.4 fL      Platelets 196 10*3/mm3      Neutrophil % 75.5 %      Lymphocyte % 11.6 %      Monocyte % 11.9 %      Eosinophil % 0.2 %      Basophil % 0.5 %      Immature Grans % 0.3 %      Neutrophils, Absolute 7.40 10*3/mm3      Lymphocytes, Absolute 1.14 10*3/mm3      Monocytes, Absolute 1.17 10*3/mm3      Eosinophils, Absolute 0.02 10*3/mm3      Basophils, Absolute 0.05 10*3/mm3      Immature Grans, Absolute 0.03 10*3/mm3      nRBC 0.0 /100 WBC            Imaging:  Imaging Results (Last 24 Hours)     Procedure Component Value Units Date/Time    CT Head Without Contrast [[id number]] Collected: 10/08/22 2101     Updated: 10/08/22 2108    Narrative:      CT HEAD WITHOUT CONTRAST     CLINICAL HISTORY: Altered mental status for 2 weeks.     TECHNIQUE: CT scan of the head was obtained with 3 mm axial soft tissue  algorithm and 2 mm bone algorithm images. No intravenous contrast was  administered. Sagittal and coronal reconstructions were obtained.     COMPARISON: CT head dated 06/07/2019.     FINDINGS:       Apparently, the patient was unable to cooperate. The submitted images  are nondiagnostic with excessive amount of motion artifact. Apparently,  the patient was unable to cooperate for any further imaging. There is no  gross evidence for acute intracranial pathology. The ventricles, sulci,  and cisterns are age appropriate. Mild changes of chronic small vessel  ischemic phenomena are identified.       Impression:         The submitted images are nondiagnostic due to excessive amount of motion  artifact. There is no gross evidence for acute intracranial pathology.  The findings as well as the marked limitations of this examination were  directly discussed with Dr. Cain on 10/08/2022 at  approximately 8:40  PM. I recommend repeat imaging once patient is more able to fully  cooperate for the exam.           Radiation dose reduction techniques were utilized, including automated  exposure control and exposure modulation based on body size.     This report was finalized on 10/8/2022 9:05 PM by Dr. Fermín Francois M.D.       XR Chest 1 View [[id number]] Collected: 10/08/22 1958     Updated: 10/08/22 2007    Narrative:      EXAMINATION: SINGLE VIEW CHEST RADIOGRAPH     HISTORY: 89-year-old male with history of shortness of air and weakness.     FINDINGS: An upright AP portable chest radiograph was obtained. No prior  chest radiograph is available for comparison. The lungs are low in  volume and there is hazy opacification at both lung bases. There is also  prominence of the bilateral perihilar pulmonary vasculature. The  diaphragm is silhouetted. There is obscuration of the bilateral  costophrenic angles.       Impression:      Low lung volumes with bibasilar atelectasis and/or pneumonia  and bilateral pulmonary vascular engorgement versus edema.     This report was finalized on 10/8/2022 8:04 PM by Dr. Yang Hayward M.D.            PPE worn at all times washed before washed up afterwards disposed of everything properly is now within 6 feet of them for more than few minutes during my exam no aerosols used at any point    Assessment and Plan:     This patient presents with new onset severe confusion and new onset A. fib.  The CT of the head does not show any acute hemorrhage or area of stroke and his exam is not at all suggestive of an acute stroke syndrome.  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   I Would query whether he could have suffered a stroke syndrome.  Even though he does not have any focal neurologic deficit at any time  This patient definitely has psychosis.  He has  been hallucinating seeing things, etc. and I like the as needed Zyprexa at this time for agitation, or if he is frightened.  He does not look like an acute stroke at this time but it is certainly possible.  At some point I would like to get an MRI scan of the brain but he is absolutely not going to tolerate an MRI of his brain at this time.  Lets see how he does overnight with the Zyprexa.  Thanks      Josr Camp MD  10/09/22  14:50 EDT

## 2022-10-09 NOTE — PROGRESS NOTES
Name: Rafa Bautista ADMIT: 10/8/2022   : 1928  PCP: Beverly Lino MD    MRN: [id number] LOS: 1 days   AGE/SEX: 89 y.o. male  ROOM: [facility]     Subjective   Subjective   The patient is asleep, prior to my arrival the patient was extremely agitated, attempted to climb out of the bed.  He received a 5 mg intramuscular injection of Zyprexa and has since settled down.  Family is at bedside requesting he be allowed to sleep.    Review of Systems   Unable to perform ROS: Mental status change        Objective   Objective   Vital Signs  Temp:  [98.1 °F (36.7 °C)-98.3 °F (36.8 °C)] 98.1 °F (36.7 °C)  Heart Rate:  [] 73  Resp:  [16-22] 16  BP: (110-183)/() 145/111  SpO2:  [90 %-100 %] 98 %  on  Flow (L/min):  [0-3] 3;   Device (Oxygen Therapy): humidified;nasal cannula  Body mass index is 27.36 kg/m².  Physical Exam  Constitutional:       General: He is not in acute distress.     Appearance: Normal appearance. He is not toxic-appearing.      Comments: Elderly   Cardiovascular:      Rate and Rhythm: Normal rate and regular rhythm.      Heart sounds: No murmur heard.  Pulmonary:      Effort: Pulmonary effort is normal. No respiratory distress.      Breath sounds: Normal breath sounds. No wheezing.   Abdominal:      General: Abdomen is flat. Bowel sounds are normal. There is no distension.      Palpations: Abdomen is soft.      Tenderness: There is no abdominal tenderness.   Musculoskeletal:         General: No tenderness.      Right lower leg: Edema present.      Left lower leg: Edema present.      Comments: B/l pitting edema   Skin:     General: Skin is warm and dry.   Neurological:      Mental Status: He is disoriented.      Motor: No weakness.         Results Review     I reviewed the patient's new clinical results.  Results from last 7 days   Lab Units 10/09/22  0432 10/08/22  1924   WBC 10*3/mm3 12.02* 9.81   HEMOGLOBIN g/dL 13.6 13.2   PLATELETS 10*3/mm3 166 196     Results from last 7  days   Lab Units 10/09/22  1014 10/08/22  1924   SODIUM mmol/L 144 147*   POTASSIUM mmol/L 3.9 3.9   CHLORIDE mmol/L 105 106   CO2 mmol/L 25.7 25.6   BUN mg/dL 35* 30*   CREATININE mg/dL 1.47* 1.35*   GLUCOSE mg/dL 100* 112*   Estimated Creatinine Clearance: 40.6 mL/min (A) (by C-G formula based on SCr of 1.47 mg/dL (H)).  Results from last 7 days   Lab Units 10/08/22  1924   ALBUMIN g/dL 4.00   BILIRUBIN mg/dL 2.2*   ALK PHOS U/L 82   AST (SGOT) U/L 33   ALT (SGPT) U/L 31     Results from last 7 days   Lab Units 10/09/22  1014 10/09/22  0432 10/08/22  1924   CALCIUM mg/dL 9.3  --  9.6   ALBUMIN g/dL  --   --  4.00   MAGNESIUM mg/dL  --  2.3 2.1     Results from last 7 days   Lab Units 10/08/22  1924   PROCALCITONIN ng/mL 0.06   LACTATE mmol/L 2.8*     COVID19   Date Value Ref Range Status   10/08/2022 Not Detected Not Detected - Ref. Range Final     Hemoglobin A1C   Date/Time Value Ref Range Status   10/09/2022 0432 5.60 4.80 - 5.60 % Final       CT Head Without Contrast  Narrative: CT HEAD WITHOUT CONTRAST     CLINICAL HISTORY: Altered mental status for 2 weeks.     TECHNIQUE: CT scan of the head was obtained with 3 mm axial soft tissue  algorithm and 2 mm bone algorithm images. No intravenous contrast was  administered. Sagittal and coronal reconstructions were obtained.     COMPARISON: CT head dated 06/07/2019.     FINDINGS:       Apparently, the patient was unable to cooperate. The submitted images  are nondiagnostic with excessive amount of motion artifact. Apparently,  the patient was unable to cooperate for any further imaging. There is no  gross evidence for acute intracranial pathology. The ventricles, sulci,  and cisterns are age appropriate. Mild changes of chronic small vessel  ischemic phenomena are identified.     Impression:    The submitted images are nondiagnostic due to excessive amount of motion  artifact. There is no gross evidence for acute intracranial pathology.  The findings as well as the  marked limitations of this examination were  directly discussed with Dr. Cain on 10/08/2022 at approximately 8:40  PM. I recommend repeat imaging once patient is more able to fully  cooperate for the exam.           Radiation dose reduction techniques were utilized, including automated  exposure control and exposure modulation based on body size.     This report was finalized on 10/8/2022 9:05 PM by Dr. Fermín Francois M.D.     XR Chest 1 View  Narrative: EXAMINATION: SINGLE VIEW CHEST RADIOGRAPH     HISTORY: 89-year-old male with history of shortness of air and weakness.     FINDINGS: An upright AP portable chest radiograph was obtained. No prior  chest radiograph is available for comparison. The lungs are low in  volume and there is hazy opacification at both lung bases. There is also  prominence of the bilateral perihilar pulmonary vasculature. The  diaphragm is silhouetted. There is obscuration of the bilateral  costophrenic angles.     Impression: Low lung volumes with bibasilar atelectasis and/or pneumonia  and bilateral pulmonary vascular engorgement versus edema.     This report was finalized on 10/8/2022 8:04 PM by Dr. Yang Hayward M.D.       Scheduled Medications  apixaban, 5 mg, Oral, Q12H  carvedilol, 6.25 mg, Oral, BID With Meals  furosemide, 40 mg, Intravenous, Q12H  levothyroxine, 50 mcg, Oral, Q AM  OLANZapine, 2.5 mg, Intramuscular, Once  terazosin, 1 mg, Oral, Nightly    Infusions   Diet  NPO Diet NPO Type: Strict NPO         I have personally reviewed:  [x]  Laboratory   []  Microbiology   [x]  Radiology   []  EKG/Telemetry   []  Cardiology/Vascular   []  Pathology   [x]  Records    Assessment/Plan     Active Hospital Problems    Diagnosis  POA   • **Acute metabolic encephalopathy [G93.41]  Yes   • New onset atrial fibrillation (HCC) [I48.91]  Yes   • CHF (congestive heart failure) (HCC) [I50.9]  Yes   • Hypothyroidism [E03.9]  Yes   • BPH (benign prostatic hyperplasia) [N40.0]  Yes       Resolved Hospital Problems   No resolved problems to display.       89 y.o. male admitted with Acute metabolic encephalopathy.    New atrial fibrillation with RVR  Acute congestive heart failure- precipitated by a fib?   - cardiology consulted; plan for echo  - rate control with coreg, lasix IV for diuresis    Acute Encephalopathy  - per family at bedside he has some mild dementia at baseline but is normally A & O x4/can carry a normal conversation/is still extremely active/drives  - neuro following, MRI is pending to exclude stroke syndrome- no lateralizing features on exam  - Zyprexa for as needed agitation; suspect delirium worsening confusion    HTN  - coreg and lasix as above, further titration as needed     Hypothyroidism  - Resume synthroid. TSH ok.     BPH  - Home regimen    · Eliquis (home med) for DVT prophylaxis.  · DNR.  · Discussed with patient, spouse, family and nursing staff.  · Anticipate discharge tbd pending clinical course      Jennifer Bailey MD  [facility]  10/09/22  16:31 EDT    I wore protective equipment throughout this patient encounter including a face mask, gloves and protective eyewear.  Hand hygiene was performed before donning protective equipment and after removal when leaving the room.

## 2022-10-09 NOTE — CONSULTS
Patient Name: Rafa Bautista  :1928  89 y.o.    Date of Admission: 10/8/2022  Date of Consultation:  10/09/22  Encounter Provider: Hannah Marr MD  Place of Service: [facility] CARDIOLOGY  Referring Provider: Dayron Hancock MD  Patient Care Team:  Beverly Lino MD as PCP - General (Internal Medicine)      Chief complaint: increased weakness, increased lower extremity edema x 1-2 weeks    Reason for consult: new onset atrial fibrillation, CHF    History of Present Illness:    This is a 89-year-old patient with history of hyperlipidemia and hypertension who presented to the emergency department with worsening weakness and confusion over 2 to 3 weeks.  According to family, he was too weak to get out of the chair on his own at home.  1 month ago, he was able to mow his own lawn.  The family also says he has had progressive lower extremity edema and short windedness but no chest pain.    On arrival, ECG showed A. fib with heart rate 117, troponin 0.027, proBNP 9434, creatinine 1.35, TSH 4.0, free T4 normal, lactate 2.8, white count 9.1, normal procalcitonin.  Chest x-ray showed atelectasis versus pneumonia and bilateral pulmonary vascular engorgement.    In the emergency department, he was given IV Lasix once is currently receiving IV Lasix twice daily.  He was given IV Lopressor and started on 25 metoprolol tartrate twice daily.  Echo has been ordered.  We been asked see him for heart failure and A. Fib.    Family gave history because the patient is very confused and restless.  He just got something for agitation and is very somnolent.  2 weeks ago, he was still driving, a month ago he was still mowing his grass.  He has been very active 89-year-old.  He started falling about 2 weeks ago for reasons that were unclear and nobody witnessed any of his falls.  There were 3 falls, 1 of which he could get up from but to which he had to have help to get up.  He  complained of short windedness to his family but never complained of dizziness.  He was progressively weak and a week ago he started using a walker which was new for him to.  He never complained to them of chest pain or heart racing and had no nausea.    Cardiac testing:  None available for review.    Past Medical History:   Diagnosis Date   • Hyperlipidemia    • Hypertension        Past Surgical History:   Procedure Laterality Date   • BACK SURGERY           Prior to Admission medications    Medication Sig Start Date End Date Taking? Authorizing Provider   levothyroxine (SYNTHROID, LEVOTHROID) 50 MCG tablet Take 1 tablet by mouth Every Morning.   Yes Provider, MD Myles   terazosin (HYTRIN) 1 MG capsule Take 1 mg by mouth Every Night.   Yes Provider, Myles, MD       Allergies   Allergen Reactions   • Penicillins        Social History     Socioeconomic History   • Marital status:    Tobacco Use   • Smoking status: Former   Substance and Sexual Activity   • Drug use: No   • Sexual activity: Defer       History reviewed. No pertinent family history.    REVIEW OF SYSTEMS:   All systems reviewed.  Pertinent positives identified in HPI.  All other systems are negative.      Objective:     Vitals:    10/08/22 2215 10/08/22 2311 10/09/22 0511 10/09/22 0738   BP: 110/100 122/82  (!) 145/111   BP Location: Left arm Left arm  Left arm   Patient Position: Lying Lying  Lying   Pulse:  92  86   Resp: 18 16  16   Temp: 98.2 °F (36.8 °C) 98.1 °F (36.7 °C)     TempSrc:  Oral  Oral   SpO2: 90% 96%  100%   Weight:   91.5 kg (201 lb 11.2 oz)    Height:         Body mass index is 27.36 kg/m².    General Appearance:   Confused and somnolent   Head:    Normocephalic, without obvious abnormality, atraumatic   Eyes:            Lids and lashes normal, conjunctivae and sclerae normal, no icterus, no pallor, corneas clear,    Ears:    Ears appear intact with no abnormalities noted   Throat:   No oral lesions, no thrush, oral  mucosa moist   Neck:   No adenopathy, supple, trachea midline, no thyromegaly, no carotid bruit, no JVD   Back:     No kyphosis present, no scoliosis present, no skin lesions, erythema or scars, no tenderness to palpation, range of motion normal   Lungs:     Clear to auscultation-decreased on the left side and is lying on the left side, respirations regular, even and unlabored    Heart:    iregular rhythm and tachycardic rate, normal S1 and S2, no murmur, no gallop, no rub, no click   Chest Wall:    No abnormalities observed   Abdomen:     Normal bowel sounds, no masses, no organomegaly, soft, nontender, nondistended, no guarding, no rebound  tenderness   Extremities:   Moves all extremities well, no edema, no cyanosis, no redness   Pulses:   Pulses palpable and equal bilaterally. Normal radial, carotid, dorsalis pedis and posterior tibial pulses bilaterally.    Skin:  Psychiatric:   No bleeding, bruising or rash    Alert and oriented x 3, normal mood and affect   Lab Review:     Results from last 7 days   Lab Units 10/08/22  1924   SODIUM mmol/L 147*   POTASSIUM mmol/L 3.9   CHLORIDE mmol/L 106   CO2 mmol/L 25.6   BUN mg/dL 30*   CREATININE mg/dL 1.35*   CALCIUM mg/dL 9.6   BILIRUBIN mg/dL 2.2*   ALK PHOS U/L 82   ALT (SGPT) U/L 31   AST (SGOT) U/L 33   GLUCOSE mg/dL 112*     Results from last 7 days   Lab Units 10/08/22  1924   TROPONIN T ng/mL 0.027     Results from last 7 days   Lab Units 10/09/22  0432   WBC 10*3/mm3 12.02*   HEMOGLOBIN g/dL 13.6   HEMATOCRIT % 40.6   PLATELETS 10*3/mm3 166     Results from last 7 days   Lab Units 10/08/22  1924   INR  1.19*     Results from last 7 days   Lab Units 10/09/22  0432   MAGNESIUM mg/dL 2.3     Results from last 7 days   Lab Units 10/09/22  0432   CHOLESTEROL mg/dL 136   TRIGLYCERIDES mg/dL 68   HDL CHOL mg/dL 43   LDL CHOL mg/dL 79             EKG      Baseline EKG  No prior available for comparison.       I personally viewed and interpreted the patient's  EKG/Telemetry data.        Assessment and Plan:       1. A. fib with RVR, heart rate is better-do not know how long this has been going on.  2. Acute congestive heart failure, likely diastolic in nature due to A. fib with RVR.  3. Falls x3 over the last 2 weeks-this is new for him.  He is really been able to ambulate without assistance until 1 week ago and has been using a walker since then  4. Hypertension  5. DILLON  6. Agitation and confusion    We will check an echocardiogram, will try to control heart rate and continue to diurese.  His blood pressure is also quite high-likely worsened by agitation and confusion.  Change carvedilol 6.25 twice daily for blood pressure and heart rate control as well as heart failure.  Maintain IV Lasix, 1 dose of IV dig.  May need to try a very low-dose of losartan to see that helps his blood pressure but will have to monitor creatinine as well.  Await echocardiogram.    All history was provided by the family who is at bedside, they are very reasonable about his care and helpful with information.    Start apixaban 5 mg twice daily but watch renal function.  Right now he still meets criteria for this because his renal function worsens, may need to decrease this to 2.5 mg twice daily.  We could use IV heparin because he might need a heart catheterization been reluctant to do so because of his agitation and him possibly pulling out IVs.    Hannah Marr MD  10/09/22  10:56 EDT

## 2022-10-10 ENCOUNTER — APPOINTMENT (OUTPATIENT)
Dept: CARDIOLOGY | Facility: HOSPITAL | Age: 87
End: 2022-10-10

## 2022-10-10 ENCOUNTER — APPOINTMENT (OUTPATIENT)
Dept: MRI IMAGING | Facility: HOSPITAL | Age: 87
End: 2022-10-10

## 2022-10-10 LAB
AORTIC DIMENSIONLESS INDEX: 0.6 (DI)
BH CV ECHO MEAS - ACS: 1.46 CM
BH CV ECHO MEAS - AO MAX PG: 3.8 MMHG
BH CV ECHO MEAS - AO MEAN PG: 1.84 MMHG
BH CV ECHO MEAS - AO V2 MAX: 97 CM/SEC
BH CV ECHO MEAS - AO V2 VTI: 15.9 CM
BH CV ECHO MEAS - AVA(I,D): 3.8 CM2
BH CV ECHO MEAS - CONTRAST EF 4CH: 34 CM2
BH CV ECHO MEAS - EDV(CUBED): 174.9 ML
BH CV ECHO MEAS - EDV(MOD-SP2): 149 ML
BH CV ECHO MEAS - EDV(MOD-SP4): 142 ML
BH CV ECHO MEAS - EF(MOD-BP): 34 %
BH CV ECHO MEAS - EF(MOD-SP2): 36.9 %
BH CV ECHO MEAS - EF(MOD-SP4): 32.4 %
BH CV ECHO MEAS - ESV(CUBED): 104.1 ML
BH CV ECHO MEAS - ESV(MOD-SP2): 94 ML
BH CV ECHO MEAS - ESV(MOD-SP4): 96 ML
BH CV ECHO MEAS - FS: 15.9 %
BH CV ECHO MEAS - IVS/LVPW: 0.98 CM
BH CV ECHO MEAS - IVSD: 0.97 CM
BH CV ECHO MEAS - LAT PEAK E' VEL: 12.3 CM/SEC
BH CV ECHO MEAS - LV DIASTOLIC VOL/BSA (35-75): 66.2 CM2
BH CV ECHO MEAS - LV MASS(C)D: 212.8 GRAMS
BH CV ECHO MEAS - LV MAX PG: 3 MMHG
BH CV ECHO MEAS - LV MEAN PG: 0.96 MMHG
BH CV ECHO MEAS - LV SYSTOLIC VOL/BSA (12-30): 44.8 CM2
BH CV ECHO MEAS - LV V1 MAX: 86.2 CM/SEC
BH CV ECHO MEAS - LV V1 VTI: 14.9 CM
BH CV ECHO MEAS - LVIDD: 5.6 CM
BH CV ECHO MEAS - LVIDS: 4.7 CM
BH CV ECHO MEAS - LVOT AREA: 4.1 CM2
BH CV ECHO MEAS - LVOT DIAM: 2.28 CM
BH CV ECHO MEAS - LVPWD: 0.99 CM
BH CV ECHO MEAS - MED PEAK E' VEL: 7.7 CM/SEC
BH CV ECHO MEAS - MV DEC SLOPE: 420.5 CM/SEC2
BH CV ECHO MEAS - MV DEC TIME: 0.11 MSEC
BH CV ECHO MEAS - MV E MAX VEL: 88.4 CM/SEC
BH CV ECHO MEAS - MV MAX PG: 3.8 MMHG
BH CV ECHO MEAS - MV MEAN PG: 1.35 MMHG
BH CV ECHO MEAS - MV P1/2T: 67.1 MSEC
BH CV ECHO MEAS - MV V2 VTI: 18.3 CM
BH CV ECHO MEAS - MVA(P1/2T): 3.3 CM2
BH CV ECHO MEAS - MVA(VTI): 3.3 CM2
BH CV ECHO MEAS - PA ACC TIME: 0.05 SEC
BH CV ECHO MEAS - PA PR(ACCEL): 57.6 MMHG
BH CV ECHO MEAS - PA V2 MAX: 77.4 CM/SEC
BH CV ECHO MEAS - PULM DIAS VEL: 25.3 CM/SEC
BH CV ECHO MEAS - PULM S/D: 0.76
BH CV ECHO MEAS - PULM SYS VEL: 19.3 CM/SEC
BH CV ECHO MEAS - RAP SYSTOLE: 3 MMHG
BH CV ECHO MEAS - RV MAX PG: 0.81 MMHG
BH CV ECHO MEAS - RV V1 MAX: 45 CM/SEC
BH CV ECHO MEAS - RV V1 VTI: 9 CM
BH CV ECHO MEAS - RVSP: 33 MMHG
BH CV ECHO MEAS - SI(MOD-SP2): 25.7 ML/M2
BH CV ECHO MEAS - SI(MOD-SP4): 21.5 ML/M2
BH CV ECHO MEAS - SV(LVOT): 61 ML
BH CV ECHO MEAS - SV(MOD-SP2): 55 ML
BH CV ECHO MEAS - SV(MOD-SP4): 46 ML
BH CV ECHO MEAS - TAPSE (>1.6): 1.53 CM
BH CV ECHO MEAS - TR MAX PG: 30.1 MMHG
BH CV ECHO MEAS - TR MAX VEL: 274.2 CM/SEC
BH CV ECHO MEASUREMENTS AVERAGE E/E' RATIO: 8.84
BH CV XLRA - RV BASE: 3.7 CM
BH CV XLRA - RV LENGTH: 7.1 CM
BH CV XLRA - RV MID: 1.97 CM
BH CV XLRA - TDI S': 5.9 CM/SEC
LEFT ATRIUM VOLUME INDEX: 36 ML/M2
MAXIMAL PREDICTED HEART RATE: 127 BPM
RPR SER QL: NORMAL
STRESS TARGET HR: 108 BPM

## 2022-10-10 PROCEDURE — 97530 THERAPEUTIC ACTIVITIES: CPT

## 2022-10-10 PROCEDURE — 99233 SBSQ HOSP IP/OBS HIGH 50: CPT | Performed by: INTERNAL MEDICINE

## 2022-10-10 PROCEDURE — 25010000002 FUROSEMIDE PER 20 MG: Performed by: NURSE PRACTITIONER

## 2022-10-10 PROCEDURE — 93306 TTE W/DOPPLER COMPLETE: CPT

## 2022-10-10 PROCEDURE — 97162 PT EVAL MOD COMPLEX 30 MIN: CPT

## 2022-10-10 PROCEDURE — 86592 SYPHILIS TEST NON-TREP QUAL: CPT | Performed by: INTERNAL MEDICINE

## 2022-10-10 PROCEDURE — 25010000002 PERFLUTREN (DEFINITY) 8.476 MG IN SODIUM CHLORIDE (PF) 0.9 % 10 ML INJECTION: Performed by: NURSE PRACTITIONER

## 2022-10-10 PROCEDURE — 93306 TTE W/DOPPLER COMPLETE: CPT | Performed by: INTERNAL MEDICINE

## 2022-10-10 PROCEDURE — 99233 SBSQ HOSP IP/OBS HIGH 50: CPT | Performed by: PHYSICIAN ASSISTANT

## 2022-10-10 RX ORDER — OLANZAPINE 5 MG/1
5 TABLET ORAL NIGHTLY
Status: DISCONTINUED | OUTPATIENT
Start: 2022-10-10 | End: 2022-10-12

## 2022-10-10 RX ADMIN — OLANZAPINE 5 MG: 5 TABLET ORAL at 22:32

## 2022-10-10 RX ADMIN — APIXABAN 5 MG: 5 TABLET, FILM COATED ORAL at 09:35

## 2022-10-10 RX ADMIN — CARVEDILOL 6.25 MG: 6.25 TABLET, FILM COATED ORAL at 09:35

## 2022-10-10 RX ADMIN — CARVEDILOL 6.25 MG: 6.25 TABLET, FILM COATED ORAL at 17:55

## 2022-10-10 RX ADMIN — APIXABAN 2.5 MG: 5 TABLET, FILM COATED ORAL at 22:32

## 2022-10-10 RX ADMIN — OLANZAPINE 5 MG: 10 INJECTION, POWDER, FOR SOLUTION INTRAMUSCULAR at 16:20

## 2022-10-10 RX ADMIN — PERFLUTREN 2 ML: 6.52 INJECTION, SUSPENSION INTRAVENOUS at 08:36

## 2022-10-10 RX ADMIN — FUROSEMIDE 40 MG: 10 INJECTION, SOLUTION INTRAMUSCULAR; INTRAVENOUS at 09:31

## 2022-10-10 RX ADMIN — OLANZAPINE 5 MG: 10 INJECTION, POWDER, FOR SOLUTION INTRAMUSCULAR at 03:44

## 2022-10-10 RX ADMIN — TERAZOSIN 1 MG: 1 CAPSULE ORAL at 22:32

## 2022-10-10 RX ADMIN — OLANZAPINE 5 MG: 10 INJECTION, POWDER, FOR SOLUTION INTRAMUSCULAR at 07:52

## 2022-10-10 NOTE — PROGRESS NOTES
LOS: 2 days   Patient Care Team:  Beverly Lino MD as PCP - General (Internal Medicine)    Chief Complaint: Follow-up new atrial fibrillation with RVR, acute combined CHF.    Interval History: Echocardiogram with ejection fraction 30 to 35% and multiple wall motion abnormalities noted.  The patient is doing slightly better today in terms of breathing.  He still has intermittent episodes of agitation.  He has had no chest pain.    Vital Signs:  Temp:  [97.3 °F (36.3 °C)-98.1 °F (36.7 °C)] 98 °F (36.7 °C)  Heart Rate:  [] 87  Resp:  [18] 18  BP: ()/() 106/94    Intake/Output Summary (Last 24 hours) at 10/10/2022 1738  Last data filed at 10/10/2022 1415  Gross per 24 hour   Intake 240 ml   Output --   Net 240 ml       Physical Exam:   General Appearance:    No acute distress, alert    Lungs:     Rales at bases bilaterally    Heart:    Irregularly irregular rhythm and normal rate. II/VI SM throughout.   Abdomen:     Soft, nontender, nondistended.    Extremities:   Trace edema of the lower extremities bilaterally     Results Review:    Results from last 7 days   Lab Units 10/09/22  1014   SODIUM mmol/L 144   POTASSIUM mmol/L 3.9   CHLORIDE mmol/L 105   CO2 mmol/L 25.7   BUN mg/dL 35*   CREATININE mg/dL 1.47*   GLUCOSE mg/dL 100*   CALCIUM mg/dL 9.3     Results from last 7 days   Lab Units 10/08/22  1924   TROPONIN T ng/mL 0.027     Results from last 7 days   Lab Units 10/09/22  0432   WBC 10*3/mm3 12.02*   HEMOGLOBIN g/dL 13.6   HEMATOCRIT % 40.6   PLATELETS 10*3/mm3 166     Results from last 7 days   Lab Units 10/08/22  1924   INR  1.19*     Results from last 7 days   Lab Units 10/09/22  0432   CHOLESTEROL mg/dL 136     Results from last 7 days   Lab Units 10/09/22  0432   MAGNESIUM mg/dL 2.3     Results from last 7 days   Lab Units 10/09/22  0432   CHOLESTEROL mg/dL 136   TRIGLYCERIDES mg/dL 68   HDL CHOL mg/dL 43   LDL CHOL mg/dL 79       I reviewed the patient's new clinical results.         Assessment:  1.  Acute combined CHF  2.  Cardiomyopathy with ejection fraction 30 to 35% (new)  3.  Atrial fibrillation with intermittent RVR (new and unknown duration)  4.  Hypertension  5.  Acute kidney injury  6.  Metabolic encephalopathy with agitation and confusion  7.  Mild baseline dementia    Plan:  -I had a long discussion with the patient and his family today.  His son was in the room at the time along with several other family members.    -His ejection fraction was 30 to 35% with regional wall motion abnormalities.  Under normal circumstances, I would recommend a heart catheterization to assess for potential ischemia as the source.  However, he is 89 and somewhat frail.  We all agreed that conservative management is best for him.  We are going to treat him medically the best we can.    -The ejection fraction certainly could be from uncontrolled atrial fibrillation as well.  Continue carvedilol 6.25 mg twice a day.    -Given the acute kidney injury, and with his age, I am going to hold further diuresis for now and reevaluate tomorrow morning.  I stopped the Lasix 40 mg IV twice daily this evening.    -With his renal function, I decreased his Eliquis from 5 mg twice a day to 2.5 mg twice a day (when factoring in his age).  If his renal function recovers, the 5 mg dose can be resumed.    -He is not a good candidate for an ACE inhibitor, Entresto, or Jardiance given his acute kidney injury currently.    -Intermittent confusion remains an issue.  Neurology and medicine are managing.    Andrew Prasad MD  10/10/22  17:38 EDT

## 2022-10-10 NOTE — PROGRESS NOTES
"Nutrition Services    Patient Name:  Rafa Bautista  YOB: 1928  MRN: [id number]  Admit Date:  10/8/2022      89 y.o. male admitted for weakness, SOA, and confusion. Nutrition following for pressure injury. Visited pt, family at bedside. Reported intake is very good currently however had been minimal in the past few days. Encouraged adequate po intake and increased protein intake to promote wound healing. Pt declined boost but agreed to try magic cup. Will continue to follow.       CLINICAL NUTRITION ASSESSMENT      Reason for Assessment Pressure Injury and/or Non-Healing Wound     Diagnosis/Problem   CC: palpitations, SOA, weakness  Dx: CHF, Afib, HTN, DILLON, dementia   Medical/Surgical History Past Medical History:   Diagnosis Date   • Hyperlipidemia    • Hypertension        Past Surgical History:   Procedure Laterality Date   • BACK SURGERY          Encounter Information        Nutrition/Diet History:     Food Preferences:    Supplements:    Factors Affecting Intake: weakness     Anthropometrics        Current Height  Current Weight  BMI kg/m2 Height: 182.9 cm (72\")  Weight: 92.1 kg (203 lb) (10/10/22 0835)  Body mass index is 27.53 kg/m².       Admission Weight 200#   Ideal Body Weight (IBW) 178#   Usual Body Weight (UBW)    Weight Change/Trend        Weight History Wt Readings from Last 30 Encounters:   10/10/22 0835 92.1 kg (203 lb)   10/10/22 0528 92.5 kg (203 lb 14.8 oz)   10/09/22 0511 91.5 kg (201 lb 11.2 oz)   10/08/22 1922 90.7 kg (200 lb)   06/07/19 0214 104 kg (230 lb)   11/04/17 0128 97.5 kg (215 lb)        Tests/Procedures        Tests/Procedures CT scan, MRI, X-Ray, Other:ECHO     Labs       Pertinent Labs    Results from last 7 days   Lab Units 10/09/22  1014 10/08/22  1924   SODIUM mmol/L 144 147*   POTASSIUM mmol/L 3.9 3.9   CHLORIDE mmol/L 105 106   CO2 mmol/L 25.7 25.6   BUN mg/dL 35* 30*   CREATININE mg/dL 1.47* 1.35*   CALCIUM mg/dL 9.3 9.6   BILIRUBIN mg/dL  --  2.2*   ALK PHOS " "U/L  --  82   ALT (SGPT) U/L  --  31   AST (SGOT) U/L  --  33   GLUCOSE mg/dL 100* 112*     Results from last 7 days   Lab Units 10/09/22  0432 10/08/22  1924   MAGNESIUM mg/dL 2.3 2.1   HEMOGLOBIN g/dL 13.6 13.2   HEMATOCRIT % 40.6 40.6   WBC 10*3/mm3 12.02* 9.81   TRIGLYCERIDES mg/dL 68  --      Results from last 7 days   Lab Units 10/09/22  0432 10/08/22  1924   INR   --  1.19*   PLATELETS 10*3/mm3 166 196     COVID19   Date Value Ref Range Status   10/08/2022 Not Detected Not Detected - Ref. Range Final     Lab Results   Component Value Date    HGBA1C 5.60 10/09/2022          Medications           Scheduled Medications apixaban, 5 mg, Oral, Q12H  carvedilol, 6.25 mg, Oral, BID With Meals  furosemide, 40 mg, Intravenous, Q12H  levothyroxine, 50 mcg, Oral, Q AM  terazosin, 1 mg, Oral, Nightly       Infusions     PRN Medications •  acetaminophen **OR** acetaminophen **OR** acetaminophen  •  aluminum-magnesium hydroxide-simethicone  •  metoprolol tartrate  •  nitroglycerin  •  OLANZapine  •  ondansetron **OR** ondansetron  •  [COMPLETED] Insert peripheral IV **AND** sodium chloride  •  sodium chloride     Physical Findings        Physical Appearance alert, overweight     NFPE Not applicable   --  Edema  lower extremity , 3+ (moderate)   Gastrointestinal constipation, last bowel movement: 10/8   Tubes/Drains none   Oral/Mouth Cavity dentures   Skin pressure injury Right gluteal (stage 2)   --  Current Nutrition Orders & Evaluation of Intake       Oral Nutrition     Food Allergies NKFA   Current PO Diet Diet Regular; Cardiac   Supplement n/a   PO Evaluation     Trending % PO Intake        --  Estimated/Assessed Needs       Energy Requirements    Height for Calculation  Height: 182.9 cm (72\")   Weight for Calculation 90.5 kg (current)   Method for Estimation  25 kcal/kg   EST Needs (kcal/day) 2263 kcal / day       Protein Requirements    Weight for Calculation 90.5 kg (current)   EST Protein Needs (g/kg) 1.0 gm/kg, 1.3 " gm/kg   EST Daily Needs (g/day) 91 - 118 gm Pro / day       Fluid Requirements     Method for Estimation 1 mL/kcal    Estimated Needs (mL/day) 2263 mL/day     PES STATEMENT / NUTRITION DIAGNOSIS      Nutrition Dx Problem  Problem: Inadequate Nutrient Intake x 3 days  Etiology: Factors Affecting Nutrition (NPO)  Signs/Symptoms: Report of Minimal PO Intake    Comment:    --  NUTRITION INTERVENTION      Intervention Goal(s) Meet estimated needs, Increase intake and Maintain weight         RD Intervention/Action Interview for preferences, Supplement provided, Encourage intake, Follow Tx Progress and Care plan reviewed         Prescription/Orders:       PO Diet       Supplements Magic cup BID (chocolate)      Enteral Nutrition       Parenteral Nutrition    New Prescription Ordered? yes   --      Monitor/Evaluation Per protocol, PO intake, Supplement intake, Pertinent labs, Skin status   Education Will instruct as appropriate   --    RD to follow per protocol.      Electronically signed by:  Luciana Mayberry RD  10/10/22 09:05 EDT

## 2022-10-10 NOTE — PLAN OF CARE
Goal Outcome Evaluation:  Plan of Care Reviewed With: patient, family           Outcome Evaluation: Pt is a 89 y/o M admitted to [facility] with confusion, weakness, and SOA. Further work-up revealed acute metabolic encephalopathy, new onset of CHF & Afib. Pt is a former smoker with a history of BPH and hypothyroidism. Pt received in bed upon arrival and agreeable to PT eval. Pt is alert and pleasantly confused. Pt's son stated he lives with his spouse with 3 RADHA with bed/bath on main level. Pt's family states he was pretty independent up until the last 3 weeks as pt has had multiple falls and started using a walker for mobility. Pt presents to PT with generalized weakness, decreased endurance, and impaired functional mobility. Pt required mod A to reach sitting EOB. Pt stood x 3 requiring min/mod A. Verbal cues provided for handplacement. Pt ambulated a total of 10' c RW requiring min A. Pt fatigues quickly with activity and is slightly unsteady. PT recommends SNF at D/C to address stated deficits.

## 2022-10-10 NOTE — PLAN OF CARE
Goal Outcome Evaluation:  Plan of Care Reviewed With: patient           Outcome Evaluation: ECHO done today. start PO scheduled zyprexa tonight. pt unable to complete MRI today due to confusion. pt started on diet today, tolerating well. pt takes PO meds with water. 3 dose of IM zyprexa given today. restraints d/c at 1400 today. family at bedside. pt worked with PT today. Q2 turn. cont to reorient patient pt remains afib on monitor.

## 2022-10-10 NOTE — PLAN OF CARE
Goal Outcome Evaluation:  Plan of Care Reviewed With: patient        Progress: no change  Outcome Evaluation: .  .HF

## 2022-10-10 NOTE — PROGRESS NOTES
Name: Rafa Bautista ADMIT: 10/8/2022   : 1928  PCP: Beverly Lino MD    MRN: [id number] LOS: 2 days   AGE/SEX: 89 y.o. male  ROOM: [facility]     Subjective   Subjective   The patient is resting in bed, he is lethargic from Zyprexa but agitated by the nasal cannula. He withdraws both of his legs when I lift up the blankets to look at his ankles. Fell trying to get out of bed last night/agitated and wound up in restraints.     Review of Systems   Unable to perform ROS: Mental status change        Objective   Objective   Vital Signs  Temp:  [97.3 °F (36.3 °C)-98.1 °F (36.7 °C)] 98 °F (36.7 °C)  Heart Rate:  [] 87  Resp:  [16-18] 18  BP: ()/() 106/94  SpO2:  [91 %-98 %] 95 %  on  Flow (L/min):  [3-3.5] 3.5;   Device (Oxygen Therapy): humidified;nasal cannula  Body mass index is 27.53 kg/m².  Physical Exam  Constitutional:       General: He is not in acute distress.     Appearance: Normal appearance. He is not toxic-appearing.      Comments: Elderly   Cardiovascular:      Rate and Rhythm: Normal rate and regular rhythm.      Heart sounds: No murmur heard.  Pulmonary:      Effort: Pulmonary effort is normal. No respiratory distress.      Breath sounds: Normal breath sounds. No wheezing.   Abdominal:      General: Abdomen is flat. Bowel sounds are normal. There is no distension.      Palpations: Abdomen is soft.      Tenderness: There is no abdominal tenderness.   Musculoskeletal:         General: No tenderness.      Right lower leg: Edema present.      Left lower leg: Edema present.      Comments: B/l pitting edema- improving   Skin:     General: Skin is warm and dry.   Neurological:      Mental Status: He is disoriented.      Motor: No weakness.       Results Review     I reviewed the patient's new clinical results.  Results from last 7 days   Lab Units 10/09/22  0432 10/08/22  1924   WBC 10*3/mm3 12.02* 9.81   HEMOGLOBIN g/dL 13.6 13.2   PLATELETS 10*3/mm3 166 196     Results from  last 7 days   Lab Units 10/09/22  1014 10/08/22  1924   SODIUM mmol/L 144 147*   POTASSIUM mmol/L 3.9 3.9   CHLORIDE mmol/L 105 106   CO2 mmol/L 25.7 25.6   BUN mg/dL 35* 30*   CREATININE mg/dL 1.47* 1.35*   GLUCOSE mg/dL 100* 112*   Estimated Creatinine Clearance: 40.9 mL/min (A) (by C-G formula based on SCr of 1.47 mg/dL (H)).  Results from last 7 days   Lab Units 10/08/22  1924   ALBUMIN g/dL 4.00   BILIRUBIN mg/dL 2.2*   ALK PHOS U/L 82   AST (SGOT) U/L 33   ALT (SGPT) U/L 31     Results from last 7 days   Lab Units 10/09/22  1014 10/09/22  0432 10/08/22  1924   CALCIUM mg/dL 9.3  --  9.6   ALBUMIN g/dL  --   --  4.00   MAGNESIUM mg/dL  --  2.3 2.1     Results from last 7 days   Lab Units 10/08/22  1924   PROCALCITONIN ng/mL 0.06   LACTATE mmol/L 2.8*     COVID19   Date Value Ref Range Status   10/08/2022 Not Detected Not Detected - Ref. Range Final     Hemoglobin A1C   Date/Time Value Ref Range Status   10/09/2022 0432 5.60 4.80 - 5.60 % Final       Adult Transthoracic Echo Complete W/ Cont if Necessary Per Protocol  •  The left ventricular cavity is mildly dilated.  •  There is severe hypokinesis of the inferoseptum, anterolateral wall,   and inferolateral wall. There is moderate apical hypokinesis  •  Left ventricular ejection fraction appears to be 31 - 35%.  •  Normal right ventricular cavity size and systolic function noted.  •  The left atrial cavity is mild to moderately dilated.  •  Mild mitral valve regurgitation is present.  •  Mild tricuspid valve regurgitation is present  •  Calculated right ventricular systolic pressure from tricuspid   regurgitation is 33 mmHg.  •  The ascending aorta is mildly enlarged at 3.8 cm  •  There is no evidence of pericardial effusion.    Scheduled Medications  apixaban, 5 mg, Oral, Q12H  carvedilol, 6.25 mg, Oral, BID With Meals  furosemide, 40 mg, Intravenous, Q12H  levothyroxine, 50 mcg, Oral, Q AM  terazosin, 1 mg, Oral, Nightly    Infusions   Diet  Diet Regular;  Cardiac       I have personally reviewed:  [x]  Laboratory   []  Microbiology   []  Radiology   [x]  EKG/Telemetry   []  Cardiology/Vascular   []  Pathology   []  Records    Assessment/Plan     Active Hospital Problems    Diagnosis  POA   • **Acute metabolic encephalopathy [G93.41]  Yes   • New onset atrial fibrillation (HCC) [I48.91]  Yes   • CHF (congestive heart failure) (HCC) [I50.9]  Yes   • Hypothyroidism [E03.9]  Yes   • BPH (benign prostatic hyperplasia) [N40.0]  Yes      Resolved Hospital Problems   No resolved problems to display.       89 y.o. male admitted with Acute metabolic encephalopathy.    New atrial fibrillation with RVR  Acute congestive heart failure- precipitated by a fib likely  - cardiology consulted  - echo with reduced EF 31-35%; multiple WMAs  - rate control with coreg, lasix IV for diuresis    Acute Encephalopathy  - per family at bedside he has some mild dementia at baseline but is normally A & O x4/can carry a normal conversation/is still extremely active/drives  - neuro following, MRI is pending to exclude stroke syndrome- no lateralizing features on exam  - Zyprexa for as needed agitation; suspect delirium worsening confusion  - pt fell trying to get out of bed last night, will schedule small dose of zyprexa at bedtime    HTN  - coreg and lasix as above, further titration as needed     Hypothyroidism  - Resume synthroid. TSH ok.     BPH  - Home regimen    · Eliquis (home med) for DVT prophylaxis.  · DNR.  · Discussed with patient, spouse, family and nursing staff.  · Anticipate discharge tbd pending clinical course    Jennifer Bailey MD  [facility]  10/10/22  14:24 EDT    I wore protective equipment throughout this patient encounter including a face mask, gloves and protective eyewear.  Hand hygiene was performed before donning protective equipment and after removal when leaving the room.

## 2022-10-10 NOTE — THERAPY EVALUATION
Patient Name: Rafa Bautista  : 1928    MRN: [id number]                              Today's Date: 10/10/2022       Admit Date: 10/8/2022    Visit Dx:     ICD-10-CM ICD-9-CM   1. Acute metabolic encephalopathy  G93.41 348.31   2. Atrial fibrillation with RVR (HCC)  I48.91 427.31   3. Acute pulmonary edema (HCC)  J81.0 518.4   4. Frequent falls  R29.6 V15.88     Patient Active Problem List   Diagnosis   • Acute metabolic encephalopathy   • New onset atrial fibrillation (HCC)   • CHF (congestive heart failure) (HCC)   • Hypothyroidism   • BPH (benign prostatic hyperplasia)     Past Medical History:   Diagnosis Date   • Hyperlipidemia    • Hypertension      Past Surgical History:   Procedure Laterality Date   • BACK SURGERY        General Information     Row Name 10/10/22 1430          Physical Therapy Time and Intention    Document Type evaluation  -CS     Mode of Treatment individual therapy;physical therapy  -CS     Row Name 10/10/22 1430          General Information    Patient Profile Reviewed yes  -CS     Prior Level of Function independent:;all household mobility;gait;transfer;bed mobility  pt recently started using walker secondary to recent falls  -CS     Existing Precautions/Restrictions fall;oxygen therapy device and L/min  -CS     Barriers to Rehab medically complex  -CS     Row Name 10/10/22 1430          Living Environment    People in Home spouse  -CS     Row Name 10/10/22 1430          Home Main Entrance    Number of Stairs, Main Entrance three  -CS     Stair Railings, Main Entrance none  -CS     Row Name 10/10/22 1430          Stairs Within Home, Primary    Number of Stairs, Within Home, Primary none  -CS     Row Name 10/10/22 1430          Cognition    Orientation Status (Cognition) oriented to;person;verbal cues/prompts needed for orientation  pt could state he was in [place] but not [facility]  -CS     Row Name 10/10/22 1430          Safety Issues, Functional Mobility    Safety  Issues Affecting Function (Mobility) awareness of need for assistance;insight into deficits/self-awareness;positioning of assistive device;safety precaution awareness;safety precautions follow-through/compliance  -CS     Impairments Affecting Function (Mobility) balance;cognition;endurance/activity tolerance;postural/trunk control;strength;shortness of breath  -CS     Cognitive Impairments, Mobility Safety/Performance awareness, need for assistance;insight into deficits/self-awareness;safety precaution awareness;safety precaution follow-through  -CS           User Key  (r) = Recorded By, (t) = Taken By, (c) = Cosigned By    Initials Name Provider Type    CS Tracie Luz, PT Physical Therapist               Mobility     Row Name 10/10/22 1432          Bed Mobility    Bed Mobility supine-sit;sit-supine  -CS     Supine-Sit [place] (Bed Mobility) moderate assist (50% patient effort);verbal cues;nonverbal cues (demo/gesture)  -CS     Sit-Supine [place] (Bed Mobility) standby assist  -CS     Assistive Device (Bed Mobility) bed rails;head of bed elevated  -CS     Comment, (Bed Mobility) required assist with trunk to reach sitting EOB  -CS     Row Name 10/10/22 1432          Sit-Stand Transfer    Sit-Stand [place] (Transfers) minimum assist (75% patient effort);moderate assist (50% patient effort);verbal cues;nonverbal cues (demo/gesture)  -CS     Assistive Device (Sit-Stand Transfers) walker, front-wheeled  -CS     Comment, (Sit-Stand Transfer) x3; first attempt pt required greater assist; improved with practice; verbal cues provided for hand placement  -CS     Row Name 10/10/22 1432          Gait/Stairs (Locomotion)    [place] Level (Gait) minimum assist (75% patient effort);verbal cues;nonverbal cues (demo/gesture)  -CS     Assistive Device (Gait) walker, front-wheeled  -CS     Distance in Feet (Gait) 10'  -CS     Deviations/Abnormal Patterns (Gait) zeferino decreased;gait speed decreased;stride  length decreased  -CS     Bilateral Gait Deviations forward flexed posture;heel strike decreased  -CS     [place] Level (Stairs) not tested  -CS     Comment, (Gait/Stairs) unsteady; required assist for walker management, pt fatigues quickly  -CS           User Key  (r) = Recorded By, (t) = Taken By, (c) = Cosigned By    Initials Name Provider Type    CS Tracie Luz, PT Physical Therapist               Obj/Interventions     Row Name 10/10/22 1434          Range of Motion Comprehensive    General Range of Motion bilateral lower extremity ROM WFL  -CS     Row Name 10/10/22 1434          Strength Comprehensive (MMT)    General Manual Muscle Testing (MMT) Assessment other (see comments)  -CS     Comment, General Manual Muscle Testing (MMT) Assessment general weakness noted; B LE >/= 3/5  -CS     Row Name 10/10/22 1434          Balance    Balance Assessment sitting static balance;sitting dynamic balance;standing static balance;standing dynamic balance  -CS     Static Sitting Balance contact guard  -CS     Dynamic Sitting Balance contact guard  -CS     Position, Sitting Balance supported;sitting edge of bed  -CS     Static Standing Balance minimal assist  -CS     Dynamic Standing Balance minimal assist  -CS     Position/Device Used, Standing Balance supported;walker, front-wheeled  -CS           User Key  (r) = Recorded By, (t) = Taken By, (c) = Cosigned By    Initials Name Provider Type    CS Tracie Luz, PT Physical Therapist               Goals/Plan     Row Name 10/10/22 1441          Bed Mobility Goal 1 (PT)    Activity/Assistive Device (Bed Mobility Goal 1, PT) sit to supine;supine to sit  -CS     [place] Level/Cues Needed (Bed Mobility Goal 1, PT) contact guard required  -CS     Time Frame (Bed Mobility Goal 1, PT) 1 week  -CS     Row Name 10/10/22 1441          Transfer Goal 1 (PT)    Activity/Assistive Device (Transfer Goal 1, PT) sit-to-stand/stand-to-sit;bed-to-chair/chair-to-bed  -CS      [place] Level/Cues Needed (Transfer Goal 1, PT) contact guard required  -CS     Time Frame (Transfer Goal 1, PT) 1 week  -CS     Row Name 10/10/22 1441          Gait Training Goal 1 (PT)    Activity/Assistive Device (Gait Training Goal 1, PT) gait (walking locomotion);assistive device use;decrease fall risk;improve balance and speed;increase endurance/gait distance  -CS     [place] Level (Gait Training Goal 1, PT) contact guard required  -CS     Distance (Gait Training Goal 1, PT) 50'  -CS     Time Frame (Gait Training Goal 1, PT) 1 week  -CS           User Key  (r) = Recorded By, (t) = Taken By, (c) = Cosigned By    Initials Name Provider Type    CS Tracie Luz, PT Physical Therapist               Clinical Impression     Row Name 10/10/22 143          Pain    Pretreatment Pain Rating 0/10 - no pain  -CS     Posttreatment Pain Rating 0/10 - no pain  -CS     Row Name 10/10/22 1436          Plan of Care Review    Plan of Care Reviewed With patient;family  -CS     Outcome Evaluation Pt is a 89 y/o M admitted to [facility] with confusion, weakness, and SOA. Further work-up revealed acute metabolic encephalopathy, new onset of CHF & Afib. Pt is a former smoker with a history of BPH and hypothyroidism. Pt received in bed upon arrival and agreeable to PT eval. Pt is alert and pleasantly confused. Pt's son stated he lives with his spouse with 3 RADHA with bed/bath on main level. Pt's family states he was pretty independent up until the last 3 weeks as pt has had multiple falls and started using a walker for mobility. Pt presents to PT with generalized weakness, decreased endurance, and impaired functional mobility. Pt required mod A to reach sitting EOB. Pt stood x 3 requiring min/mod A. Verbal cues provided for handplacement. Pt ambulated a total of 10' c RW requiring min A. Pt fatigues quickly with activity and is slightly unsteady. PT recommends SNF at D/C to address stated deficits.  -CS     Row Name 10/10/22  1435          Therapy Assessment/Plan (PT)    Patient/Family Therapy Goals Statement (PT) to get stronger  -CS     Rehab Potential (PT) good, to achieve stated therapy goals  -CS     Criteria for Skilled Interventions Met (PT) yes;meets criteria  -CS     Therapy Frequency (PT) 5 times/wk  -CS     Row Name 10/10/22 1435          Vital Signs    Pre SpO2 (%) 94  -CS     Intra SpO2 (%) 88  -CS     Post SpO2 (%) 96  nsg notified  -CS     Row Name 10/10/22 1435          Positioning and Restraints    Pre-Treatment Position in bed  -CS     Post Treatment Position bed  -CS     In Bed notified nsg;supine;call light within reach;encouraged to call for assist;exit alarm on;with family/caregiver  -CS           User Key  (r) = Recorded By, (t) = Taken By, (c) = Cosigned By    Initials Name Provider Type    Tracie Bucio, PT Physical Therapist               Outcome Measures     Row Name 10/10/22 1443          How much help from another person do you currently need...    Turning from your back to your side while in flat bed without using bedrails? 3  -CS     Moving from lying on back to sitting on the side of a flat bed without bedrails? 2  -CS     Moving to and from a bed to a chair (including a wheelchair)? 2  -CS     Standing up from a chair using your arms (e.g., wheelchair, bedside chair)? 2  -CS     Climbing 3-5 steps with a railing? 1  -CS     To walk in hospital room? 1  -CS     AM-PAC 6 Clicks Score (PT) 11  -CS     Highest level of mobility 4 --> Transferred to chair/commode  -CS     Row Name 10/10/22 1443          Functional Assessment    Outcome Measure Options AM-PAC 6 Clicks Basic Mobility (PT)  -CS           User Key  (r) = Recorded By, (t) = Taken By, (c) = Cosigned By    Initials Name Provider Type    Tracie Bucio PT Physical Therapist                             Physical Therapy Education     Title: PT OT SLP Therapies (In Progress)     Topic: Physical Therapy (In Progress)     Point: Mobility training  (Done)     Learning Progress Summary           Patient Acceptance, TB,E, VU,DU,NR by  at 10/10/2022 1443   Family Acceptance, TB,E, VU,DU,NR by CS at 10/10/2022 1443                   Point: Home exercise program (Not Started)     Learner Progress:  Not documented in this visit.          Point: Body mechanics (Done)     Learning Progress Summary           Patient Acceptance, TB,E, VU,DU,NR by  at 10/10/2022 1443   Family Acceptance, TB,E, VU,DU,NR by CS at 10/10/2022 1443                   Point: Precautions (Not Started)     Learner Progress:  Not documented in this visit.                      User Key     Initials Effective Dates Name Provider Type Discipline     09/22/22 -  Tracie Luz, PT Physical Therapist PT              PT Recommendation and Plan     Plan of Care Reviewed With: patient, family  Outcome Evaluation: Pt is a 89 y/o M admitted to [facility] with confusion, weakness, and SOA. Further work-up revealed acute metabolic encephalopathy, new onset of CHF & Afib. Pt is a former smoker with a history of BPH and hypothyroidism. Pt received in bed upon arrival and agreeable to PT eval. Pt is alert and pleasantly confused. Pt's son stated he lives with his spouse with 3 RADHA with bed/bath on main level. Pt's family states he was pretty independent up until the last 3 weeks as pt has had multiple falls and started using a walker for mobility. Pt presents to PT with generalized weakness, decreased endurance, and impaired functional mobility. Pt required mod A to reach sitting EOB. Pt stood x 3 requiring min/mod A. Verbal cues provided for handplacement. Pt ambulated a total of 10' c RW requiring min A. Pt fatigues quickly with activity and is slightly unsteady. PT recommends SNF at D/C to address stated deficits.     Time Calculation:    PT Charges     Row Name 10/10/22 1444             Time Calculation    Start Time 1336  -CS      Stop Time 1402  -CS      Time Calculation (min) 26 min  -CS      PT  Received On 10/10/22  -CS      PT - Next Appointment 10/11/22  -      PT Goal Re-Cert Due Date 10/17/22  -CS         Time Calculation- PT    Total Timed Code Minutes- PT 24 minute(s)  -CS         Timed Charges    03662 - PT Therapeutic Activity Minutes 24  -CS         Total Minutes    Timed Charges Total Minutes 24  -CS       Total Minutes 24  -CS            User Key  (r) = Recorded By, (t) = Taken By, (c) = Cosigned By    Initials Name Provider Type    CS Tracie Luz, PT Physical Therapist              Therapy Charges for Today     Code Description Service Date Service Provider Modifiers Qty    25587769313 HC PT THERAPEUTIC ACT EA 15 MIN 10/10/2022 Tracie Luz, PT GP 2    43556111971 HC PT EVAL MOD COMPLEXITY 3 10/10/2022 Tracie Luz, PT GP 1          PT G-Codes  Outcome Measure Options: AM-PAC 6 Clicks Basic Mobility (PT)  AM-PAC 6 Clicks Score (PT): 11    Tracie Luz PT  10/10/2022

## 2022-10-10 NOTE — PLAN OF CARE
Goal Outcome Evaluation:  Plan of Care Reviewed With: patient, son        Progress: no change  Outcome Evaluation: Pt trying to get out of bed for a few hours. Zyprexa given seemed like he was calming down however it took at least a hour. Son came in this morrning and stated that pt needed more zyprexa felt the patient was restless. PRN ordered q 4 passed on to day shift RN to give it soon. Cont in Afib more controlled in the 90's. Echo this am. MRI pending. CTM, safety maintained.

## 2022-10-10 NOTE — NURSING NOTE
Bed alarming and walked in patient found on the floor to the right side of the bed sitting on the floor. Patients states he wants to go home. Assessed patient and helped back to bed.

## 2022-10-10 NOTE — PROGRESS NOTES
"DOS: 10/10/2022  NAME: Rafa Bautista   : 1928  PCP: Beverly Lino MD  Chief Complaint   Patient presents with   • Palpitations   • Shortness of Breath   • Weakness - Generalized       Chief complaint: AMS  Subjective: Pt back from MRI, he could not tolerate d/t movement.  Family at bedside.  They remark that he is actually much more himself currently.  He is joking and following commands.  Last night was bad night, he fell out of bed    Objective:  Vital signs: /94 (BP Location: Left arm, Patient Position: Lying)   Pulse 87   Temp 98 °F (36.7 °C) (Oral)   Resp 18   Ht 182.9 cm (72\")   Wt 92.1 kg (203 lb)   SpO2 94%   BMI 27.53 kg/m²      Gen: NAD, vitals reviewed  MS: oriented to self and hospital, remote memory intact, decent attention/concentration, language intact, no neglect.  CN: visual acuity grossly normal, PERRL, EOMI, no facial droop, no dysarthria  Motor:no pronator drift, symmetric mvmts of upper and lower extremities, normal tone  Sensory: intact to light touch all 4 ext.    ROS:  No weakness, numbness  No fevers, chills      Laboratory results:  Lab Results   Component Value Date    GLUCOSE 100 (H) 10/09/2022    CALCIUM 9.3 10/09/2022     10/09/2022    K 3.9 10/09/2022    CO2 25.7 10/09/2022     10/09/2022    BUN 35 (H) 10/09/2022    CREATININE 1.47 (H) 10/09/2022    EGFRIFNONA 86 2017    BCR 23.8 10/09/2022    ANIONGAP 13.3 10/09/2022     Lab Results   Component Value Date    WBC 12.02 (H) 10/09/2022    HGB 13.6 10/09/2022    HCT 40.6 10/09/2022    MCV 84.1 10/09/2022     10/09/2022     Lab Results   Component Value Date    LDL 79 10/09/2022    LDL 82 2019         Lab 10/09/22  0432   HEMOGLOBIN A1C 5.60        Review of labs: crt 1.47, wbc 12,000, la 2.8, u/a mild leuks, probnp >9000    Review and interpretation of imaging:   CT head  FINDINGS:       Apparently, the patient was unable to cooperate. The submitted images  are nondiagnostic " with excessive amount of motion artifact. Apparently,  the patient was unable to cooperate for any further imaging. There is no  gross evidence for acute intracranial pathology. The ventricles, sulci,  and cisterns are age appropriate. Mild changes of chronic small vessel  ischemic phenomena are identified.     IMPRESSION:     The submitted images are nondiagnostic due to excessive amount of motion  artifact. There is no gross evidence for acute intracranial pathology.  The findings as well as the marked limitations of this examination were  directly discussed with Dr. Cain on 10/08/2022 at approximately 8:40  PM. I recommend repeat imaging once patient is more able to fully  cooperate for the exam.    Workup to date:    Diagnoses:  1. AMS  2. Impaired gait  3. New onset Afib    Impression: 89 yo previously very functional M with PMH of hypothyroidism, BPH, CHF, hypertension, hyperlipidemia who presented with several week history of confusion and multiple falls.  He was found to have new onset A. fib with heart failure exacerbation.  We are attempting for MRI of the brain to evaluate for contributing stroke.  He could not tolerate today.  Unsure we should press hard for this as unlikely to change mgmt.  He is started on eliquis. His exam is quite good at the moment and would not opt for repeat pharmacologic tx to obtain.  Though should this change as can expect fluction we can try for MRI with sedation.        Plan:  1. Hold on MRI brain currently-unlikely to change mgmt.  Ordered lipid panel for am    2.  Scheduled low dose zyprexa for bedtime +  Additional PRN agent for agitation.  Invited family member to stay overnight    I spent at least 30 minutes interviewing, examining, and counseling patient.  I independently reviewed documentation, laboratory and diagnostic findings, external documentation where applicable, and formulated treatment plan which was discussed with the patient.      Thank you for this  consultation.  Discussed above plan with neuro attending, Dr. Orozco who agrees with above plan.  Neurology team is available for concerns or questions.

## 2022-10-11 LAB
ALBUMIN SERPL-MCNC: 3.6 G/DL (ref 3.5–5.2)
ALBUMIN/GLOB SERPL: 1.6 G/DL
ALP SERPL-CCNC: 80 U/L (ref 39–117)
ALT SERPL W P-5'-P-CCNC: 33 U/L (ref 1–41)
ANION GAP SERPL CALCULATED.3IONS-SCNC: 14 MMOL/L (ref 5–15)
AST SERPL-CCNC: 45 U/L (ref 1–40)
BASOPHILS # BLD AUTO: 0.03 10*3/MM3 (ref 0–0.2)
BASOPHILS NFR BLD AUTO: 0.2 % (ref 0–1.5)
BILIRUB SERPL-MCNC: 1.8 MG/DL (ref 0–1.2)
BUN SERPL-MCNC: 54 MG/DL (ref 8–23)
BUN/CREAT SERPL: 24.1 (ref 7–25)
CALCIUM SPEC-SCNC: 8.8 MG/DL (ref 8.2–9.6)
CHLORIDE SERPL-SCNC: 104 MMOL/L (ref 98–107)
CHOLEST SERPL-MCNC: 133 MG/DL (ref 0–200)
CO2 SERPL-SCNC: 27 MMOL/L (ref 22–29)
CREAT SERPL-MCNC: 2.24 MG/DL (ref 0.76–1.27)
DEPRECATED RDW RBC AUTO: 41.9 FL (ref 37–54)
EGFRCR SERPLBLD CKD-EPI 2021: 26.7 ML/MIN/1.73
EOSINOPHIL # BLD AUTO: 0.11 10*3/MM3 (ref 0–0.4)
EOSINOPHIL NFR BLD AUTO: 0.8 % (ref 0.3–6.2)
ERYTHROCYTE [DISTWIDTH] IN BLOOD BY AUTOMATED COUNT: 13.4 % (ref 12.3–15.4)
GLOBULIN UR ELPH-MCNC: 2.3 GM/DL
GLUCOSE SERPL-MCNC: 114 MG/DL (ref 65–99)
HCT VFR BLD AUTO: 41.5 % (ref 37.5–51)
HDLC SERPL-MCNC: 37 MG/DL (ref 40–60)
HGB BLD-MCNC: 13.6 G/DL (ref 13–17.7)
IMM GRANULOCYTES # BLD AUTO: 0.08 10*3/MM3 (ref 0–0.05)
IMM GRANULOCYTES NFR BLD AUTO: 0.6 % (ref 0–0.5)
LDLC SERPL CALC-MCNC: 80 MG/DL (ref 0–100)
LDLC/HDLC SERPL: 2.16 {RATIO}
LYMPHOCYTES # BLD AUTO: 0.68 10*3/MM3 (ref 0.7–3.1)
LYMPHOCYTES NFR BLD AUTO: 5.1 % (ref 19.6–45.3)
MAGNESIUM SERPL-MCNC: 2.2 MG/DL (ref 1.7–2.3)
MCH RBC QN AUTO: 28.2 PG (ref 26.6–33)
MCHC RBC AUTO-ENTMCNC: 32.8 G/DL (ref 31.5–35.7)
MCV RBC AUTO: 86.1 FL (ref 79–97)
MONOCYTES # BLD AUTO: 1.48 10*3/MM3 (ref 0.1–0.9)
MONOCYTES NFR BLD AUTO: 11.2 % (ref 5–12)
NEUTROPHILS NFR BLD AUTO: 10.87 10*3/MM3 (ref 1.7–7)
NEUTROPHILS NFR BLD AUTO: 82.1 % (ref 42.7–76)
NRBC BLD AUTO-RTO: 0 /100 WBC (ref 0–0.2)
PLATELET # BLD AUTO: 175 10*3/MM3 (ref 140–450)
PMV BLD AUTO: 11.1 FL (ref 6–12)
POTASSIUM SERPL-SCNC: 3.5 MMOL/L (ref 3.5–5.2)
PROT SERPL-MCNC: 5.9 G/DL (ref 6–8.5)
RBC # BLD AUTO: 4.82 10*6/MM3 (ref 4.14–5.8)
SODIUM SERPL-SCNC: 145 MMOL/L (ref 136–145)
TRIGL SERPL-MCNC: 81 MG/DL (ref 0–150)
VLDLC SERPL-MCNC: 16 MG/DL (ref 5–40)
WBC NRBC COR # BLD: 13.25 10*3/MM3 (ref 3.4–10.8)

## 2022-10-11 PROCEDURE — 80061 LIPID PANEL: CPT | Performed by: PHYSICIAN ASSISTANT

## 2022-10-11 PROCEDURE — 83735 ASSAY OF MAGNESIUM: CPT | Performed by: INTERNAL MEDICINE

## 2022-10-11 PROCEDURE — 99232 SBSQ HOSP IP/OBS MODERATE 35: CPT | Performed by: INTERNAL MEDICINE

## 2022-10-11 PROCEDURE — 99233 SBSQ HOSP IP/OBS HIGH 50: CPT | Performed by: NURSE PRACTITIONER

## 2022-10-11 PROCEDURE — 85025 COMPLETE CBC W/AUTO DIFF WBC: CPT | Performed by: INTERNAL MEDICINE

## 2022-10-11 PROCEDURE — 80053 COMPREHEN METABOLIC PANEL: CPT | Performed by: INTERNAL MEDICINE

## 2022-10-11 RX ADMIN — TERAZOSIN 1 MG: 1 CAPSULE ORAL at 21:02

## 2022-10-11 RX ADMIN — ACETAMINOPHEN 650 MG: 325 SUSPENSION ORAL at 15:58

## 2022-10-11 RX ADMIN — OLANZAPINE 5 MG: 5 TABLET ORAL at 21:02

## 2022-10-11 RX ADMIN — APIXABAN 2.5 MG: 5 TABLET, FILM COATED ORAL at 08:41

## 2022-10-11 RX ADMIN — CARVEDILOL 6.25 MG: 6.25 TABLET, FILM COATED ORAL at 08:41

## 2022-10-11 RX ADMIN — LEVOTHYROXINE SODIUM 50 MCG: 0.05 TABLET ORAL at 07:53

## 2022-10-11 RX ADMIN — APIXABAN 5 MG: 5 TABLET, FILM COATED ORAL at 21:02

## 2022-10-11 RX ADMIN — OLANZAPINE 5 MG: 10 INJECTION, POWDER, FOR SOLUTION INTRAMUSCULAR at 02:46

## 2022-10-11 NOTE — PLAN OF CARE
Goal Outcome Evaluation:  Plan of Care Reviewed With: patient   Diuretic in Use: None - Lasix DC - poor kidney function  Response to Diuretics (Output greater than intake): NA  Daily Weight (up or down): Down  O2 Requirements: 2 L   Functional Status (Activity level, tolerance and respiratory symptoms): Generalized weakness, not able to turn himself in bed without assistance  Discharge Plans: Home vs SNF - TBD        Outcome Evaluation: Pt A/O x2. Calm and cooperative today. Safety maintained

## 2022-10-11 NOTE — CASE MANAGEMENT/SOCIAL WORK
Continued Stay Note  [facility]     Patient Name: Rafa Bautista  MRN: [id number]  Today's Date: 10/11/2022    Admit Date: 10/8/2022    Plan: SNF referrals pending   Discharge Plan     Row Name 10/11/22 1221       Plan    Plan SNF referrals pending    Patient/Family in Agreement with Plan yes    Plan Comments CCP notified by son/Chase patient and family request the following SNF referrals in order of preference: 1. [facility] 2. [place] 3. [facility] 4. [place] [facility] 5. [facility] [place]. [facility] and [facility] notified of referral. CCP following clinical course to assist with transition to SNF when medically ready for discharge. Lisa WILLAMS RN/CCP               Discharge Codes    No documentation.               Expected Discharge Date and Time     Expected Discharge Date Expected Discharge Time    Oct 12, 2022             Zaida Oliver

## 2022-10-11 NOTE — PROGRESS NOTES
Name: Rafa Bautista ADMIT: 10/8/2022   : 1928  PCP: Beverly Lino MD    MRN: [id number] LOS: 3 days   AGE/SEX: 89 y.o. male  ROOM: [facility]     Subjective   Subjective   The patient is resting in bed, he is sedated, he had poor sleep last night and was agitated. He is out of restraints.  Family at bedside.     Review of Systems   Unable to perform ROS: Mental status change        Objective   Objective   Vital Signs  Temp:  [98.6 °F (37 °C)] 98.6 °F (37 °C)  Heart Rate:  [77-90] 90  Resp:  [18] 18  BP: (116-159)/() 140/90  SpO2:  [91 %-94 %] 93 %  on  Flow (L/min):  [2-3.5] 2;   Device (Oxygen Therapy): room air  Body mass index is 27.06 kg/m².  Physical Exam  Constitutional:       General: He is not in acute distress.     Appearance: Normal appearance. He is not toxic-appearing.      Comments: Elderly   Cardiovascular:      Rate and Rhythm: Normal rate and regular rhythm.      Heart sounds: No murmur heard.  Pulmonary:      Effort: Pulmonary effort is normal. No respiratory distress.      Breath sounds: Normal breath sounds. No wheezing.   Abdominal:      General: Abdomen is flat. Bowel sounds are normal. There is no distension.      Palpations: Abdomen is soft.      Tenderness: There is no abdominal tenderness.   Musculoskeletal:         General: No tenderness.      Right lower leg: Edema present.      Left lower leg: Edema present.      Comments: B/l pitting edema- improving   Skin:     General: Skin is warm and dry.   Neurological:      Mental Status: He is disoriented.      Motor: No weakness.       Results Review     I reviewed the patient's new clinical results.  Results from last 7 days   Lab Units 10/11/22  0407 10/09/22  0432 10/08/22  1924   WBC 10*3/mm3 13.25* 12.02* 9.81   HEMOGLOBIN g/dL 13.6 13.6 13.2   PLATELETS 10*3/mm3 175 166 196     Results from last 7 days   Lab Units 10/11/22  0407 10/09/22  1014 10/08/22  1924   SODIUM mmol/L 145 144 147*   POTASSIUM mmol/L 3.5 3.9  3.9   CHLORIDE mmol/L 104 105 106   CO2 mmol/L 27.0 25.7 25.6   BUN mg/dL 54* 35* 30*   CREATININE mg/dL 2.24* 1.47* 1.35*   GLUCOSE mg/dL 114* 100* 112*   Estimated Creatinine Clearance: 26.4 mL/min (A) (by C-G formula based on SCr of 2.24 mg/dL (H)).  Results from last 7 days   Lab Units 10/11/22  0407 10/08/22  1924   ALBUMIN g/dL 3.60 4.00   BILIRUBIN mg/dL 1.8* 2.2*   ALK PHOS U/L 80 82   AST (SGOT) U/L 45* 33   ALT (SGPT) U/L 33 31     Results from last 7 days   Lab Units 10/11/22  0407 10/09/22  1014 10/09/22  0432 10/08/22  1924   CALCIUM mg/dL 8.8 9.3  --  9.6   ALBUMIN g/dL 3.60  --   --  4.00   MAGNESIUM mg/dL 2.2  --  2.3 2.1     Results from last 7 days   Lab Units 10/08/22  1924   PROCALCITONIN ng/mL 0.06   LACTATE mmol/L 2.8*     COVID19   Date Value Ref Range Status   10/08/2022 Not Detected Not Detected - Ref. Range Final     Hemoglobin A1C   Date/Time Value Ref Range Status   10/09/2022 0432 5.60 4.80 - 5.60 % Final       Adult Transthoracic Echo Complete W/ Cont if Necessary Per Protocol  •  The left ventricular cavity is mildly dilated.  •  There is severe hypokinesis of the inferoseptum, anterolateral wall,   and inferolateral wall. There is moderate apical hypokinesis  •  Left ventricular ejection fraction appears to be 31 - 35%.  •  Normal right ventricular cavity size and systolic function noted.  •  The left atrial cavity is mild to moderately dilated.  •  Mild mitral valve regurgitation is present.  •  Mild tricuspid valve regurgitation is present  •  Calculated right ventricular systolic pressure from tricuspid   regurgitation is 33 mmHg.  •  The ascending aorta is mildly enlarged at 3.8 cm  •  There is no evidence of pericardial effusion.    Scheduled Medications  apixaban, 2.5 mg, Oral, Q12H  carvedilol, 6.25 mg, Oral, BID With Meals  levothyroxine, 50 mcg, Oral, Q AM  OLANZapine, 5 mg, Oral, Nightly  terazosin, 1 mg, Oral, Nightly    Infusions   Diet  Diet Regular; Cardiac       I have  personally reviewed:  [x]  Laboratory   []  Microbiology   []  Radiology   [x]  EKG/Telemetry   []  Cardiology/Vascular   []  Pathology   []  Records    Assessment/Plan     Active Hospital Problems    Diagnosis  POA   • **Acute metabolic encephalopathy [G93.41]  Yes   • New onset atrial fibrillation (HCC) [I48.91]  Yes   • CHF (congestive heart failure) (HCC) [I50.9]  Yes   • Hypothyroidism [E03.9]  Yes   • BPH (benign prostatic hyperplasia) [N40.0]  Yes      Resolved Hospital Problems   No resolved problems to display.       89 y.o. male admitted with Acute metabolic encephalopathy.    New atrial fibrillation with RVR  Acute congestive heart failure- precipitated by a fib likely  - cardiology consulted  - echo with reduced EF 31-35%; multiple WMAs  - rate control with coreg, cardiology is recommending medical management, no cath; lasix held for elevation in Cr.    Acute Encephalopathy  - per family at bedside he has some mild dementia at baseline but is normally A & O x4/can carry a normal conversation/is still extremely active/drives  - neuro following, canceled mri- per neuro wouldn't   - Zyprexa for as needed agitation  - d/w NP for neuro at bedside- she is to d/w neuro attending and adjust zyprexa    HTN  - coreg as above, further titration as needed     Hypothyroidism  - Resume synthroid. TSH ok.     BPH  - Home regimen    · Eliquis (home med) for DVT prophylaxis.  · DNR.  · Discussed with patient, spouse, family and nursing staff. And neuro NP Nathan.   · Anticipate discharge tbd pending clinical course rehab/SNF    Jennifer Bailey MD  [facility]  10/11/22  13:19 EDT    I wore protective equipment throughout this patient encounter including a face mask, gloves and protective eyewear.  Hand hygiene was performed before donning protective equipment and after removal when leaving the room.

## 2022-10-11 NOTE — CASE MANAGEMENT/SOCIAL WORK
Discharge Planning Assessment  [facility]     Patient Name: Rafa Bautista  MRN: [id number]  Today's Date: 10/11/2022    Admit Date: 10/8/2022    Plan: Follow up with family to make SNF referrals   Discharge Needs Assessment     Row Name 10/11/22 0822       Living Environment    People in Home spouse    Current Living Arrangements home    Primary Care Provided by self    Provides Primary Care For no one    Quality of Family Relationships helpful;involved       Resource/Environmental Concerns    Resource/Environmental Concerns none       Transition Planning    Patient/Family Anticipated Services at Transition skilled nursing    Transportation Anticipated family or friend will provide       Discharge Needs Assessment    Readmission Within the Last 30 Days no previous admission in last 30 days    Equipment Currently Used at Home cane, straight;walker, rolling    Concerns to be Addressed discharge planning    Outpatient/Agency/Support Group Needs skilled nursing facility    Discharge Facility/Level of Care Needs nursing facility, skilled    Provided Post Acute Provider List? Yes    Post Acute Provider List Nursing Home    Provided Post Acute Provider Quality & Resource List? Yes    Post Acute Provider Quality and Resource List Nursing Home    Delivered To Patient;Support Person    Support Person sonChase and wife, Ling    Method of Delivery In person               Discharge Plan     Row Name 10/11/22 0826       Plan    Plan Follow up with family to make SNF referrals    Patient/Family in Agreement with Plan yes    Plan Comments CCP met with patient, spouse Ling, and son Chase at bedside. Introduced self and explained role. Patient lives with his wife, Ling. At baseline he transports himself to appointments and is IADL. Patient fills prescriptions at [facility] and sees Beverly Lino for PCP. Patient does have AD/LW documents and his family was encouraged to bring a copy to the hospital to place on file. For DME  patient owns a cane and walker but does not always use them. His family denies any HH or VISH history. CCP discussed current PT recommendations for SNF at discharge and his family is in agreement. CCP delivered medicare.gov list for patient and family to review and requested 3-5 choices to make referrals. Spouse/Ling requests CCP correspond with son Chase regarding discharge planning. Packet started in CCP office. Lisa WILLAMS RN/AMY              Continued Care and Services - Admitted Since 10/8/2022    Coordination has not been started for this encounter.       Expected Discharge Date and Time     Expected Discharge Date Expected Discharge Time    Oct 12, 2022          Demographic Summary     Row Name 10/11/22 0821       General Information    Admission Type inpatient    Arrived From home    Required Notices Provided Important Message from Medicare    Referral Source admission list    Reason for Consult discharge planning               Functional Status     Row Name 10/11/22 0822       Functional Status    Usual Activity Tolerance good    Current Activity Tolerance fair       Functional Status, IADL    Medications independent    Meal Preparation independent    Housekeeping independent    Laundry independent    Shopping independent               Psychosocial    No documentation.                Abuse/Neglect    No documentation.                Legal    No documentation.                Substance Abuse    No documentation.                Patient Forms    No documentation.                   Zaida Oliver

## 2022-10-11 NOTE — PLAN OF CARE
Goal Outcome Evaluation:              Outcome Evaluation: HR 80-90'S AFIB AT REST. HAD HIS PO ZYPREXA AT HS BUT BECAME AGITATED DURING NIGHT,  SWINGING  HIS ARMS AND LEGS AT FAMILY MEMBER, AND THEY REQUESTED TO GIVE HIM PRN DOSE OF IM ZYPREXA. WHICH DID CALM HIM DOWN.

## 2022-10-11 NOTE — PROGRESS NOTES
"DOS: 10/11/2022  NAME: Rafa Bautista   : 1928  PCP: Beverly Lino MD  Chief Complaint   Patient presents with   • Palpitations   • Shortness of Breath   • Weakness - Generalized     Patient seen in follow-up today; new to me      Neurology     Subjective: Patient impulsive/restless/agitated requiring Zyprexa x2 separate doses.  On my exam he remains very sleepy difficult to arouse.  Withdraws to stimuli and opens his eyes to voice and touch-although quickly returns to sleep.     Wife and son at bedside    Objective:  Vital signs: /90   Pulse 90   Temp 98.6 °F (37 °C) (Oral)   Resp 18   Ht 182.9 cm (72\")   Wt 90.5 kg (199 lb 8.3 oz)   SpO2 94%   BMI 27.06 kg/m²       HEENT: Normocephalic, atraumatic   COR: RRR  Resp: Even and unlabored  Extremities: Equal pulses, nondistal embolization    Neurological:   MS: Drowsy/arousable with stimuli/touch.  Minimal verbal response.  CN: Limited due to drowsy state; forces eyes closed  Motor: Moves all extremities equally-no obvious weakness  Reflexes: Toes downgoing  Sensory: Withdraws all 4 extremities equally to stim  Coordination: Unable    Laboratory results:  Lab Results   Component Value Date    GLUCOSE 114 (H) 10/11/2022    CALCIUM 8.8 10/11/2022     10/11/2022    K 3.5 10/11/2022    CO2 27.0 10/11/2022     10/11/2022    BUN 54 (H) 10/11/2022    CREATININE 2.24 (H) 10/11/2022    EGFRIFNONA 86 2017    BCR 24.1 10/11/2022    ANIONGAP 14.0 10/11/2022     Lab Results   Component Value Date    WBC 13.25 (H) 10/11/2022    HGB 13.6 10/11/2022    HCT 41.5 10/11/2022    MCV 86.1 10/11/2022     10/11/2022     Lab Results   Component Value Date    CHOL 133 10/11/2022    CHOL 136 10/09/2022     Lab Results   Component Value Date    HDL 37 (L) 10/11/2022    HDL 43 10/09/2022    HDL 55 2019     Lab Results   Component Value Date    LDL 80 10/11/2022    LDL 79 10/09/2022    LDL 82 2019     Lab Results   Component " Value Date    TRIG 81 10/11/2022    TRIG 68 10/09/2022    TRIG 69 05/16/2019         Lab 10/09/22  0432   HEMOGLOBIN A1C 5.60              Review and interpretation of imaging:  CT HEAD WITHOUT CONTRAST     CLINICAL HISTORY: Altered mental status for 2 weeks.     TECHNIQUE: CT scan of the head was obtained with 3 mm axial soft tissue  algorithm and 2 mm bone algorithm images. No intravenous contrast was  administered. Sagittal and coronal reconstructions were obtained.     COMPARISON: CT head dated 06/07/2019.     FINDINGS:       Apparently, the patient was unable to cooperate. The submitted images  are nondiagnostic with excessive amount of motion artifact. Apparently,  the patient was unable to cooperate for any further imaging. There is no  gross evidence for acute intracranial pathology. The ventricles, sulci,  and cisterns are age appropriate. Mild changes of chronic small vessel  ischemic phenomena are identified.     IMPRESSION:     The submitted images are nondiagnostic due to excessive amount of motion  artifact. There is no gross evidence for acute intracranial pathology.  The findings as well as the marked limitations of this examination were  directly discussed with Dr. Cain on 10/08/2022 at approximately 8:40  PM. I recommend repeat imaging once patient is more able to fully  cooperate for the exam.           Radiation dose reduction techniques were utilized, including automated  exposure control and exposure modulation based on body size.     This report was finalized on 10/8/2022 9:05 PM by Dr. Fermín Francois M.D.  EXAMINATION: SINGLE VIEW CHEST RADIOGRAPH     HISTORY: 89-year-old male with history of shortness of air and weakness.     FINDINGS: An upright AP portable chest radiograph was obtained. No prior  chest radiograph is available for comparison. The lungs are low in  volume and there is hazy opacification at both lung bases. There is also  prominence of the bilateral perihilar pulmonary  vasculature. The  diaphragm is silhouetted. There is obscuration of the bilateral  costophrenic angles.     IMPRESSION:  Low lung volumes with bibasilar atelectasis and/or pneumonia  and bilateral pulmonary vascular engorgement versus edema.     This report was finalized on 10/8/2022 8:04 PM by Dr. Yang Hayward M.D.    Impression: This a 89-year-old gentleman with known history of hypothyroidism, BPH, CHF, hypertension, hyperlipidemia who presented to [facility] 10/8 with several week history of confusion and multiple falls for which our service was consulted.  On arrival patient found to be in new onset A. fib with heart failure exacerbation.  Initial CT of the head limited due to motion artifact-no acute intracranial findings noted.  MRI of the brain initially ordered but felt to be beneficial to care management therefore discontinued due to patient's inability to tolerate.  Once admission patient started on Eliquis for new onset A. Fib.    Neurologically, stable although with sundowning symptoms requiring medication specifically Zyprexa.  Patient is somnolent/sedate on my exam felt to be secondary to medication effect in setting of renal failure-creatinine continues to rise 2.24 today/GRE 26.7.  Recommendations below. No further neurology workup indicated. We will sign off and see again upon request.      Plan:  · Eliquis 2.5 mg BID   · Zyprea 5 mg early evening approximately 5 PM to assist with sleep-would avoid excessive/repeat doses of sedating medication due to renal failure      Case reviewed with attending neurologist Dr. Orozco and she agrees with treatment plan above  ALEJANDRO Montes De Oca

## 2022-10-11 NOTE — DISCHARGE PLACEMENT REQUEST
"Rafa Bautista (89 y.o. Male)     Date of Birth   12/26/1928    Social Security Number       Address   [address] [id number]    Home Phone   [phone]    MRN   [id number]       Restorationism   Anabaptist    Marital Status                               Admission Date   10/8/22    Admission Type   Emergency    Admitting Provider   Dayron Hancock MD    Attending Provider   Jennifer Bailey MD    Department, Room/Bed   [address], [address]       Discharge Date       Discharge Disposition       Discharge Destination                               Attending Provider: Jennifer Bailey MD    Allergies: Penicillins    Isolation: None   Infection: None   Code Status: No CPR    Ht: 182.9 cm (72\")   Wt: 90.5 kg (199 lb 8.3 oz)    Admission Cmt: None   Principal Problem: Acute metabolic encephalopathy [G93.41]                 Active Insurance as of 10/8/2022     Primary Coverage     Payor Plan Insurance Group Employer/Plan Group    MEDICARE MEDICARE A & B      Payor Plan Address Payor Plan Phone Number Payor Plan Fax Number Effective Dates    [address] [phone]  12/1/1993 - None Entered    [facility] [id number]       Subscriber Name Subscriber Birth Date Member ID       RAFA BAUTISTA 12/26/1928 [id number]           Secondary Coverage     Payor Plan Insurance Group Employer/Plan Group    [facility] SUPP [id number]     Payor Plan Address Payor Plan Phone Number Payor Plan Fax Number Effective Dates    [address]   12/1/2016 - None Entered    [facility] [id number]       Subscriber Name Subscriber Birth Date Member ID       RAFA BAUTISTA 12/26/1928 [id number]                 Emergency Contacts      (Rel.) Home Phone Work Phone Mobile Phone    Chase Bautista (Son) [phone] -- --    MicheleLing (Spouse) [phone] -- --            "

## 2022-10-11 NOTE — PROGRESS NOTES
LOS: 3 days   Patient Care Team:  Beverly Lino MD as PCP - General (Internal Medicine)    Chief Complaint: Follow-up new atrial fibrillation with RVR, acute combined CHF.    Interval History: He was sleeping when I came in the room, although his wife states that he had been up earlier that day.  His shortness of breath evidently has improved.  No chest pain reported.    Vital Signs:  Temp:  [98.6 °F (37 °C)] 98.6 °F (37 °C)  Heart Rate:  [77-90] 85  Resp:  [16-18] 16  BP: (116-159)/() 125/95    Intake/Output Summary (Last 24 hours) at 10/11/2022 1411  Last data filed at 10/10/2022 2232  Gross per 24 hour   Intake 360 ml   Output --   Net 360 ml       Physical Exam:   General Appearance:    No acute distress, sleeping   Lungs:     Rales at bases bilaterally    Heart:    Irregularly irregular rhythm and normal rate. II/VI SM throughout.   Abdomen:     Soft, nontender, nondistended.    Extremities:   Trace edema of the lower extremities bilaterally     Results Review:    Results from last 7 days   Lab Units 10/11/22  0407   SODIUM mmol/L 145   POTASSIUM mmol/L 3.5   CHLORIDE mmol/L 104   CO2 mmol/L 27.0   BUN mg/dL 54*   CREATININE mg/dL 2.24*   GLUCOSE mg/dL 114*   CALCIUM mg/dL 8.8     Results from last 7 days   Lab Units 10/08/22  1924   TROPONIN T ng/mL 0.027     Results from last 7 days   Lab Units 10/11/22  0407   WBC 10*3/mm3 13.25*   HEMOGLOBIN g/dL 13.6   HEMATOCRIT % 41.5   PLATELETS 10*3/mm3 175     Results from last 7 days   Lab Units 10/08/22  1924   INR  1.19*     Results from last 7 days   Lab Units 10/11/22  0407   CHOLESTEROL mg/dL 133     Results from last 7 days   Lab Units 10/11/22  0407   MAGNESIUM mg/dL 2.2     Results from last 7 days   Lab Units 10/11/22  0407   CHOLESTEROL mg/dL 133   TRIGLYCERIDES mg/dL 81   HDL CHOL mg/dL 37*   LDL CHOL mg/dL 80       I reviewed the patient's new clinical results.        Assessment:  1.  Acute combined CHF  2.  Cardiomyopathy with ejection  fraction 30 to 35% (new)  3.  Atrial fibrillation with intermittent RVR (new and unknown duration)  4.  Hypertension  5.  Acute kidney injury  6.  Metabolic encephalopathy with agitation and confusion  7.  Mild baseline dementia    Plan:  -Conservative management, agreed upon by myself and his family.  I would not pursue a cardiac catheterization in this setting with his age, comorbidities, and frailty.    -The ejection fraction certainly could be from uncontrolled atrial fibrillation as well.  Continue carvedilol 6.25 mg twice a day.  Continue Eliquis 2.5 mg twice a day given his age and renal function.    -IV Lasix held.  Renal function is worse.  This obviously needs to be watched closely in this elderly patient.  He is not a good candidate for an ACE inhibitor, Entresto, or Jardiance given his renal insufficiency.    -Still with intermittent confusion.  Neurology medicine managing.    -Unfortunately, the medical options for treatment are limited.  Will continue to follow.    Andrew Prasad MD  10/11/22  14:11 EDT

## 2022-10-12 LAB
ANION GAP SERPL CALCULATED.3IONS-SCNC: 12.4 MMOL/L (ref 5–15)
BUN SERPL-MCNC: 52 MG/DL (ref 8–23)
BUN/CREAT SERPL: 27.1 (ref 7–25)
CALCIUM SPEC-SCNC: 8.7 MG/DL (ref 8.2–9.6)
CHLORIDE SERPL-SCNC: 104 MMOL/L (ref 98–107)
CO2 SERPL-SCNC: 28.6 MMOL/L (ref 22–29)
CREAT SERPL-MCNC: 1.92 MG/DL (ref 0.76–1.27)
EGFRCR SERPLBLD CKD-EPI 2021: 32.1 ML/MIN/1.73
GLUCOSE SERPL-MCNC: 110 MG/DL (ref 65–99)
POTASSIUM SERPL-SCNC: 3.3 MMOL/L (ref 3.5–5.2)
SODIUM SERPL-SCNC: 145 MMOL/L (ref 136–145)

## 2022-10-12 PROCEDURE — 99232 SBSQ HOSP IP/OBS MODERATE 35: CPT | Performed by: NURSE PRACTITIONER

## 2022-10-12 PROCEDURE — 97530 THERAPEUTIC ACTIVITIES: CPT

## 2022-10-12 PROCEDURE — 80048 BASIC METABOLIC PNL TOTAL CA: CPT | Performed by: INTERNAL MEDICINE

## 2022-10-12 RX ORDER — OLANZAPINE 5 MG/1
5 TABLET ORAL NIGHTLY
Status: DISCONTINUED | OUTPATIENT
Start: 2022-10-12 | End: 2022-10-13

## 2022-10-12 RX ADMIN — APIXABAN 2.5 MG: 5 TABLET, FILM COATED ORAL at 09:12

## 2022-10-12 RX ADMIN — LEVOTHYROXINE SODIUM 50 MCG: 0.05 TABLET ORAL at 05:08

## 2022-10-12 RX ADMIN — OLANZAPINE 5 MG: 5 TABLET ORAL at 17:07

## 2022-10-12 RX ADMIN — TERAZOSIN 1 MG: 1 CAPSULE ORAL at 20:36

## 2022-10-12 RX ADMIN — APIXABAN 2.5 MG: 5 TABLET, FILM COATED ORAL at 20:36

## 2022-10-12 RX ADMIN — CARVEDILOL 6.25 MG: 6.25 TABLET, FILM COATED ORAL at 09:07

## 2022-10-12 RX ADMIN — CARVEDILOL 6.25 MG: 6.25 TABLET, FILM COATED ORAL at 17:07

## 2022-10-12 NOTE — PLAN OF CARE
Goal Outcome Evaluation:  Plan of Care Reviewed With: patient, family        Progress: improving  Outcome Evaluation: Pt received in bed upon arrival and agreeable to PT. Pt seems much more alert today. Pt required min A to reach sitting EOB and completed 10 reps B LE strengthening exercises prior to mobility. Pt stood requiring mod A with verbal cues for hand placement as pt tends to pull on walker when coming to stand. Pt ambulated 15' c RW requiring min A. Pt tends to push walker to far forward requiring assist for safety. Pt slightly unsteady but no overt LOB. Pt UIC at end of session with all needs in reach. Pt will continue to benefit from skilled PT to address strength, endurance, and functional mobility.

## 2022-10-12 NOTE — PLAN OF CARE
Goal Outcome Evaluation:      Pt alert x1, more calm tonight a little restless at the beginning of the shift. Pt didn't go to sleep until after midnight.

## 2022-10-12 NOTE — PROGRESS NOTES
"    Patient Name: Rafa Bautista  :1928  89 y.o.      Patient Care Team:  Beverly Lino MD as PCP - General (Internal Medicine)    Chief Complaint: follow up atrial fibrillation, cardiomyopathy    Interval History: he is sitting up in the chair. Ambulated to bathroom. Doing much better today.        Objective   Vital Signs  Temp:  [97.6 °F (36.4 °C)-97.7 °F (36.5 °C)] 97.7 °F (36.5 °C)  Heart Rate:  [75-85] 83  Resp:  [16-18] 18  BP: ()/(59-95) 121/76    Intake/Output Summary (Last 24 hours) at 10/12/2022 120  Last data filed at 10/12/2022 0600  Gross per 24 hour   Intake 120 ml   Output --   Net 120 ml     Flowsheet Rows    Flowsheet Row First Filed Value   Admission Height 182.9 cm (72\") Documented at 10/08/2022 1922   Admission Weight 90.7 kg (200 lb) Documented at 10/08/2022 1922          Physical Exam:   General Appearance:    Alert, cooperative, in no acute distress   Lungs:     Clear to auscultation.  Normal respiratory effort and rate.      Heart:    irregular rhythm and normal rate, normal S1 and S2, no murmurs, gallops or rubs.     Chest Wall:    No abnormalities observed   Abdomen:     Soft, nontender, positive bowel sounds.     Extremities:   no cyanosis, clubbing   No marked joint deformities.  Adequate musculoskeletal strength.  1+ pitting edema     Results Review:    Results from last 7 days   Lab Units 10/12/22  0519   SODIUM mmol/L 145   POTASSIUM mmol/L 3.3*   CHLORIDE mmol/L 104   CO2 mmol/L 28.6   BUN mg/dL 52*   CREATININE mg/dL 1.92*   GLUCOSE mg/dL 110*   CALCIUM mg/dL 8.7     Results from last 7 days   Lab Units 10/08/22  1924   TROPONIN T ng/mL 0.027     Results from last 7 days   Lab Units 10/11/22  0407   WBC 10*3/mm3 13.25*   HEMOGLOBIN g/dL 13.6   HEMATOCRIT % 41.5   PLATELETS 10*3/mm3 175     Results from last 7 days   Lab Units 10/08/22  1924   INR  1.19*     Results from last 7 days   Lab Units 10/11/22  0407   MAGNESIUM mg/dL 2.2     Results from last 7 days "   Lab Units 10/11/22  0407   CHOLESTEROL mg/dL 133   TRIGLYCERIDES mg/dL 81   HDL CHOL mg/dL 37*   LDL CHOL mg/dL 80               Medication Review:   apixaban, 2.5 mg, Oral, Q12H  carvedilol, 6.25 mg, Oral, BID With Meals  levothyroxine, 50 mcg, Oral, Q AM  OLANZapine, 5 mg, Oral, Nightly  terazosin, 1 mg, Oral, Nightly              Assessment & Plan   1. Acute combined systolic and diastolic heart failure - GDMT with carvedilol. He is not a good candidate for an ACE inhibitor, Entresto, or Jardiance given his renal insufficiency  2. Cardiomyopathy with ejection fraction 30-35% (new). Conservative management. Would not pursue a cardiac catheterization given his age and comorbidities.   3. Atrial fibrillation with intermittent RVR - HR very well controlled today. Tolerating beta blocker and anticoagulated with apixaban.   4. Acute kidney injury - improving. Continue to hold diuretics.  5. Metabolic encephalopathy with agitation and confusion  6. Mild baseline dementia  7. Hypothyroidism - TSH within defined limits on levothyroxine.     Continue to hold IV diuretics and monitor kidney function.   HR and BP stable on current dose of carvedilol.   Anticoagulated with apixaban.   No changes today. Will follow.    ALEJANDRO Bundy  [facility]  10/12/22  12:01 EDT

## 2022-10-12 NOTE — THERAPY TREATMENT NOTE
Patient Name: Rafa Bautista  : 1928    MRN: [id number]                              Today's Date: 10/12/2022       Admit Date: 10/8/2022    Visit Dx:     ICD-10-CM ICD-9-CM   1. Acute metabolic encephalopathy  G93.41 348.31   2. Atrial fibrillation with RVR (HCC)  I48.91 427.31   3. Acute pulmonary edema (HCC)  J81.0 518.4   4. Frequent falls  R29.6 V15.88     Patient Active Problem List   Diagnosis   • Acute metabolic encephalopathy   • New onset atrial fibrillation (HCC)   • CHF (congestive heart failure) (HCC)   • Hypothyroidism   • BPH (benign prostatic hyperplasia)     Past Medical History:   Diagnosis Date   • Hyperlipidemia    • Hypertension      Past Surgical History:   Procedure Laterality Date   • BACK SURGERY        General Information     Row Name 10/12/22 1351          Physical Therapy Time and Intention    Document Type therapy note (daily note)  -CS     Mode of Treatment individual therapy;physical therapy  -CS     Row Name 10/12/22 1351          General Information    Existing Precautions/Restrictions fall  -CS     Row Name 10/12/22 1351          Cognition    Orientation Status (Cognition) oriented to;person;place  -CS     Row Name 10/12/22 1351          Safety Issues, Functional Mobility    Impairments Affecting Function (Mobility) balance;cognition;endurance/activity tolerance;strength;postural/trunk control  -CS           User Key  (r) = Recorded By, (t) = Taken By, (c) = Cosigned By    Initials Name Provider Type    CS Tracie Luz PT Physical Therapist               Mobility     Row Name 10/12/22 1351          Bed Mobility    Bed Mobility supine-sit  -CS     Supine-Sit [place] (Bed Mobility) minimum assist (75% patient effort);verbal cues;nonverbal cues (demo/gesture)  -CS     Assistive Device (Bed Mobility) bed rails;head of bed elevated  -CS     Comment, (Bed Mobility) UIC at end of session  -CS     Row Name 10/12/22 1351          Bed-Chair Transfer    Bed-Chair [place]  (Transfers) minimum assist (75% patient effort);verbal cues;nonverbal cues (demo/gesture)  -CS     Assistive Device (Bed-Chair Transfers) walker, front-wheeled  -CS     Row Name 10/12/22 1351          Sit-Stand Transfer    Sit-Stand [place] (Transfers) moderate assist (50% patient effort);verbal cues;nonverbal cues (demo/gesture)  -CS     Assistive Device (Sit-Stand Transfers) walker, front-wheeled  -CS     Comment, (Sit-Stand Transfer) x 1; pt tends to pull on walker to stand; verbal cues for hand placement  -CS     Row Name 10/12/22 1351          Gait/Stairs (Locomotion)    [place] Level (Gait) minimum assist (75% patient effort);verbal cues;nonverbal cues (demo/gesture)  -     Assistive Device (Gait) walker, front-wheeled  -CS     Distance in Feet (Gait) 15'  -CS     Deviations/Abnormal Patterns (Gait) zeferino decreased;gait speed decreased;stride length decreased  -CS     Bilateral Gait Deviations forward flexed posture;heel strike decreased  -CS     [place] Level (Stairs) not tested  -CS     Comment, (Gait/Stairs) unsteady; requires assist for walker management as pt tends to push walker to far away  -CS           User Key  (r) = Recorded By, (t) = Taken By, (c) = Cosigned By    Initials Name Provider Type    Tracie Bucio PT Physical Therapist               Obj/Interventions     Row Name 10/12/22 7726          Motor Skills    Therapeutic Exercise other (see comments)  10 reps B LE AP, LAQ, & seated marches  -     Row Name 10/12/22 3950          Balance    Balance Assessment sitting static balance;sitting dynamic balance;standing static balance;standing dynamic balance  -CS     Static Sitting Balance contact guard  -CS     Dynamic Sitting Balance contact guard  -CS     Position, Sitting Balance supported;sitting edge of bed  -CS     Static Standing Balance minimal assist  -CS     Dynamic Standing Balance minimal assist  -CS     Position/Device Used, Standing Balance supported;walker,  front-wheeled  -CS           User Key  (r) = Recorded By, (t) = Taken By, (c) = Cosigned By    Initials Name Provider Type    Tracie Bucio, PT Physical Therapist               Goals/Plan    No documentation.                Clinical Impression     Row Name 10/12/22 1350          Pain    Pretreatment Pain Rating 0/10 - no pain  -CS     Posttreatment Pain Rating 0/10 - no pain  -CS     Row Name 10/12/22 1357          Plan of Care Review    Plan of Care Reviewed With patient;family  -CS     Progress improving  -CS     Outcome Evaluation Pt received in bed upon arrival and agreeable to PT. Pt seems much more alert today. Pt required min A to reach sitting EOB and completed 10 reps B LE strengthening exercises prior to mobility. Pt stood requiring mod A with verbal cues for hand placement as pt tends to pull on walker when coming to stand. Pt ambulated 15' c RW requiring min A. Pt tends to push walker to far forward requiring assist for safety. Pt slightly unsteady but no overt LOB. Pt UIC at end of session with all needs in reach. Pt will continue to benefit from skilled PT to address strength, endurance, and functional mobility.  -CS     Row Name 10/12/22 [facility]3          Therapy Assessment/Plan (PT)    Criteria for Skilled Interventions Met (PT) yes;meets criteria  -CS     Therapy Frequency (PT) 5 times/wk  -CS     Row Name 10/12/22 [facility]9          Positioning and Restraints    Pre-Treatment Position in bed  -CS     Post Treatment Position chair  -CS     In Chair notified nsg;reclined;call light within reach;encouraged to call for assist;exit alarm on;with family/caregiver  -CS           User Key  (r) = Recorded By, (t) = Taken By, (c) = Cosigned By    Initials Name Provider Type    Tracie Bucio, PT Physical Therapist               Outcome Measures     Row Name 10/12/22 2645          How much help from another person do you currently need...    Turning from your back to your side while in flat bed without using  bedrails? 3  -CS     Moving from lying on back to sitting on the side of a flat bed without bedrails? 2  -CS     Moving to and from a bed to a chair (including a wheelchair)? 3  -CS     Standing up from a chair using your arms (e.g., wheelchair, bedside chair)? 2  -CS     Climbing 3-5 steps with a railing? 1  -CS     To walk in hospital room? 3  -CS     AM-PAC 6 Clicks Score (PT) 14  -CS     Highest level of mobility 4 --> Transferred to chair/commode  -CS     Row Name 10/12/22 1355          Functional Assessment    Outcome Measure Options AM-PAC 6 Clicks Basic Mobility (PT)  -CS           User Key  (r) = Recorded By, (t) = Taken By, (c) = Cosigned By    Initials Name Provider Type    CS Tracie Luz, PT Physical Therapist                             Physical Therapy Education     Title: PT OT SLP Therapies (Done)     Topic: Physical Therapy (Done)     Point: Mobility training (Done)     Learning Progress Summary           Patient Acceptance, E,TB, VU,DU,NR by CS at 10/12/2022 1356    Acceptance, TB,E, VU,DU,NR by CS at 10/10/2022 1443   Family Acceptance, E,TB, VU,DU,NR by CS at 10/12/2022 1356    Acceptance, TB,E, VU,DU,NR by CS at 10/10/2022 1443                   Point: Home exercise program (Done)     Learning Progress Summary           Patient Acceptance, E,TB, VU,DU,NR by CS at 10/12/2022 1356   Family Acceptance, E,TB, VU,DU,NR by CS at 10/12/2022 1356                   Point: Body mechanics (Done)     Learning Progress Summary           Patient Acceptance, E,TB, VU,DU,NR by CS at 10/12/2022 1356    Acceptance, TB,E, VU,DU,NR by CS at 10/10/2022 1443   Family Acceptance, E,TB, VU,DU,NR by CS at 10/12/2022 1356    Acceptance, TB,E, VU,DU,NR by CS at 10/10/2022 1443                   Point: Precautions (Done)     Learning Progress Summary           Patient Acceptance, E,TB, VU,DU,NR by CS at 10/12/2022 1356   Family Acceptance, E,TB, VU,DU,NR by RACHEL at 10/12/2022 1148                               User Key      Initials Effective Dates Name Provider Type Discipline    CS 09/22/22 -  Tracie Luz, PT Physical Therapist PT              PT Recommendation and Plan     Plan of Care Reviewed With: patient, family  Progress: improving  Outcome Evaluation: Pt received in bed upon arrival and agreeable to PT. Pt seems much more alert today. Pt required min A to reach sitting EOB and completed 10 reps B LE strengthening exercises prior to mobility. Pt stood requiring mod A with verbal cues for hand placement as pt tends to pull on walker when coming to stand. Pt ambulated 15' c RW requiring min A. Pt tends to push walker to far forward requiring assist for safety. Pt slightly unsteady but no overt LOB. Pt UIC at end of session with all needs in reach. Pt will continue to benefit from skilled PT to address strength, endurance, and functional mobility.     Time Calculation:    PT Charges     Row Name 10/12/22 1356             Time Calculation    Start Time 1009  -CS      Stop Time 1029  -CS      Time Calculation (min) 20 min  -CS      PT Received On 10/12/22  -CS      PT - Next Appointment 10/13/22  -CS         Time Calculation- PT    Total Timed Code Minutes- PT 18 minute(s)  -CS         Timed Charges    43660 - PT Therapeutic Activity Minutes 18  -CS         Total Minutes    Timed Charges Total Minutes 18  -CS       Total Minutes 18  -CS            User Key  (r) = Recorded By, (t) = Taken By, (c) = Cosigned By    Initials Name Provider Type    CS Tracie Luz, PT Physical Therapist              Therapy Charges for Today     Code Description Service Date Service Provider Modifiers Qty    28790538339  PT THERAPEUTIC ACT EA 15 MIN 10/12/2022 Tracie Luz PT GP 1          PT G-Codes  Outcome Measure Options: AM-PAC 6 Clicks Basic Mobility (PT)  AM-PAC 6 Clicks Score (PT): 14    Tracie Luz PT  10/12/2022

## 2022-10-12 NOTE — PROGRESS NOTES
Name: Rafa Bautista ADMIT: 10/8/2022   : 1928  PCP: Beverly Lino MD    MRN: [id number] LOS: 4 days   AGE/SEX: 89 y.o. male  ROOM: [facility]     Subjective   Subjective   The patient is sitting up in his recliner, this is the first time I have seen him awake/alert. He is able to answer very simple questions but is still confused.  He remains out of restraints.  Family at bedside. We reviewed delirium precautions.     Review of Systems   Unable to perform ROS: Mental status change        Objective   Objective   Vital Signs  Temp:  [97.6 °F (36.4 °C)-97.7 °F (36.5 °C)] 97.7 °F (36.5 °C)  Heart Rate:  [75-85] 83  Resp:  [16-18] 18  BP: ()/(59-95) 121/76  SpO2:  [93 %-97 %] 97 %  on  Flow (L/min):  [2] 2;   Device (Oxygen Therapy): nasal cannula  Body mass index is 27.12 kg/m².  Physical Exam  Constitutional:       General: He is not in acute distress.     Appearance: Normal appearance. He is not toxic-appearing.      Comments: Elderly   Cardiovascular:      Rate and Rhythm: Normal rate and regular rhythm.      Heart sounds: No murmur heard.  Pulmonary:      Effort: Pulmonary effort is normal. No respiratory distress.      Breath sounds: Normal breath sounds. No wheezing.   Abdominal:      General: Abdomen is flat. Bowel sounds are normal. There is no distension.      Palpations: Abdomen is soft.      Tenderness: There is no abdominal tenderness.   Musculoskeletal:         General: No tenderness.      Right lower leg: Edema present.      Left lower leg: Edema present.      Comments: B/l pitting edema- improving   Skin:     General: Skin is warm and dry.   Neurological:      Mental Status: He is alert. He is disoriented.      Motor: No weakness.       Results Review     I reviewed the patient's new clinical results.  Results from last 7 days   Lab Units 10/11/22  0407 10/09/22  0432 10/08/22  1924   WBC 10*3/mm3 13.25* 12.02* 9.81   HEMOGLOBIN g/dL 13.6 13.6 13.2   PLATELETS 10*3/mm3 175 166  196     Results from last 7 days   Lab Units 10/12/22  0519 10/11/22  0407 10/09/22  1014 10/08/22  1924   SODIUM mmol/L 145 145 144 147*   POTASSIUM mmol/L 3.3* 3.5 3.9 3.9   CHLORIDE mmol/L 104 104 105 106   CO2 mmol/L 28.6 27.0 25.7 25.6   BUN mg/dL 52* 54* 35* 30*   CREATININE mg/dL 1.92* 2.24* 1.47* 1.35*   GLUCOSE mg/dL 110* 114* 100* 112*   Estimated Creatinine Clearance: 30.8 mL/min (A) (by C-G formula based on SCr of 1.92 mg/dL (H)).  Results from last 7 days   Lab Units 10/11/22  0407 10/08/22  1924   ALBUMIN g/dL 3.60 4.00   BILIRUBIN mg/dL 1.8* 2.2*   ALK PHOS U/L 80 82   AST (SGOT) U/L 45* 33   ALT (SGPT) U/L 33 31     Results from last 7 days   Lab Units 10/12/22  0519 10/11/22  0407 10/09/22  1014 10/09/22  0432 10/08/22  1924   CALCIUM mg/dL 8.7 8.8 9.3  --  9.6   ALBUMIN g/dL  --  3.60  --   --  4.00   MAGNESIUM mg/dL  --  2.2  --  2.3 2.1     Results from last 7 days   Lab Units 10/08/22  1924   PROCALCITONIN ng/mL 0.06   LACTATE mmol/L 2.8*     COVID19   Date Value Ref Range Status   10/08/2022 Not Detected Not Detected - Ref. Range Final     No results found for: HGBA1C, POCGLU    Adult Transthoracic Echo Complete W/ Cont if Necessary Per Protocol  •  The left ventricular cavity is mildly dilated.  •  There is severe hypokinesis of the inferoseptum, anterolateral wall,   and inferolateral wall. There is moderate apical hypokinesis  •  Left ventricular ejection fraction appears to be 31 - 35%.  •  Normal right ventricular cavity size and systolic function noted.  •  The left atrial cavity is mild to moderately dilated.  •  Mild mitral valve regurgitation is present.  •  Mild tricuspid valve regurgitation is present  •  Calculated right ventricular systolic pressure from tricuspid   regurgitation is 33 mmHg.  •  The ascending aorta is mildly enlarged at 3.8 cm  •  There is no evidence of pericardial effusion.    Scheduled Medications  apixaban, 2.5 mg, Oral, Q12H  carvedilol, 6.25 mg, Oral, BID  With Meals  levothyroxine, 50 mcg, Oral, Q AM  OLANZapine, 5 mg, Oral, Nightly  terazosin, 1 mg, Oral, Nightly    Infusions   Diet  Diet Regular; Cardiac       I have personally reviewed:  [x]  Laboratory   []  Microbiology   []  Radiology   [x]  EKG/Telemetry   []  Cardiology/Vascular   []  Pathology   []  Records    Assessment/Plan     Active Hospital Problems    Diagnosis  POA   • **Acute metabolic encephalopathy [G93.41]  Yes   • New onset atrial fibrillation (HCC) [I48.91]  Yes   • CHF (congestive heart failure) (HCC) [I50.9]  Yes   • Hypothyroidism [E03.9]  Yes   • BPH (benign prostatic hyperplasia) [N40.0]  Yes      Resolved Hospital Problems   No resolved problems to display.       89 y.o. male admitted with Acute metabolic encephalopathy.    New atrial fibrillation with RVR  Acute congestive heart failure- precipitated by a fib likely  - cardiology consulted  - echo with reduced EF 31-35%; multiple WMAs  - rate control with coreg, cardiology is recommending medical management, no cath; lasix held for elevation in Cr.    Acute Encephalopathy  - per family at bedside he has some mild dementia at baseline but is normally A & O x4/can carry a normal conversation/is still extremely active/drives  - neuro following, canceled mri- per neuro wouldn't   - Zyprexa for as needed agitation  - zyprexa re-scheduled for 5pm    HTN  - coreg as above, further titration as needed     Hypothyroidism  - Resume synthroid. TSH ok.     BPH  - Home regimen    · Eliquis (home med) for DVT prophylaxis.  · DNR.  · Discussed with patient, spouse, family and nursing staff.    · Anticipate discharge tbd pending clinical course hoping to get patient home with HH.    Jennifer Bailey MD  [facility]  10/12/22  12:06 EDT    I wore protective equipment throughout this patient encounter including a face mask, gloves and protective eyewear.  Hand hygiene was performed before donning protective  equipment and after removal when leaving the room.

## 2022-10-13 LAB
ANION GAP SERPL CALCULATED.3IONS-SCNC: 9.5 MMOL/L (ref 5–15)
BASOPHILS # BLD AUTO: 0.02 10*3/MM3 (ref 0–0.2)
BASOPHILS NFR BLD AUTO: 0.2 % (ref 0–1.5)
BUN SERPL-MCNC: 50 MG/DL (ref 8–23)
BUN/CREAT SERPL: 32.5 (ref 7–25)
CALCIUM SPEC-SCNC: 8.2 MG/DL (ref 8.2–9.6)
CHLORIDE SERPL-SCNC: 103 MMOL/L (ref 98–107)
CO2 SERPL-SCNC: 32.5 MMOL/L (ref 22–29)
CREAT SERPL-MCNC: 1.54 MG/DL (ref 0.76–1.27)
DEPRECATED RDW RBC AUTO: 40.5 FL (ref 37–54)
EGFRCR SERPLBLD CKD-EPI 2021: 41.8 ML/MIN/1.73
EOSINOPHIL # BLD AUTO: 0.1 10*3/MM3 (ref 0–0.4)
EOSINOPHIL NFR BLD AUTO: 0.8 % (ref 0.3–6.2)
ERYTHROCYTE [DISTWIDTH] IN BLOOD BY AUTOMATED COUNT: 13.2 % (ref 12.3–15.4)
GLUCOSE SERPL-MCNC: 118 MG/DL (ref 65–99)
HCT VFR BLD AUTO: 38.3 % (ref 37.5–51)
HGB BLD-MCNC: 12.7 G/DL (ref 13–17.7)
IMM GRANULOCYTES # BLD AUTO: 0.05 10*3/MM3 (ref 0–0.05)
IMM GRANULOCYTES NFR BLD AUTO: 0.4 % (ref 0–0.5)
LYMPHOCYTES # BLD AUTO: 1.06 10*3/MM3 (ref 0.7–3.1)
LYMPHOCYTES NFR BLD AUTO: 8.8 % (ref 19.6–45.3)
MCH RBC QN AUTO: 27.7 PG (ref 26.6–33)
MCHC RBC AUTO-ENTMCNC: 33.2 G/DL (ref 31.5–35.7)
MCV RBC AUTO: 83.6 FL (ref 79–97)
MONOCYTES # BLD AUTO: 1.65 10*3/MM3 (ref 0.1–0.9)
MONOCYTES NFR BLD AUTO: 13.8 % (ref 5–12)
NEUTROPHILS NFR BLD AUTO: 76 % (ref 42.7–76)
NEUTROPHILS NFR BLD AUTO: 9.1 10*3/MM3 (ref 1.7–7)
NRBC BLD AUTO-RTO: 0 /100 WBC (ref 0–0.2)
PLATELET # BLD AUTO: 172 10*3/MM3 (ref 140–450)
PMV BLD AUTO: 11.3 FL (ref 6–12)
POTASSIUM SERPL-SCNC: 3.2 MMOL/L (ref 3.5–5.2)
RBC # BLD AUTO: 4.58 10*6/MM3 (ref 4.14–5.8)
SODIUM SERPL-SCNC: 145 MMOL/L (ref 136–145)
WBC NRBC COR # BLD: 11.98 10*3/MM3 (ref 3.4–10.8)

## 2022-10-13 PROCEDURE — 99232 SBSQ HOSP IP/OBS MODERATE 35: CPT | Performed by: NURSE PRACTITIONER

## 2022-10-13 PROCEDURE — 80048 BASIC METABOLIC PNL TOTAL CA: CPT | Performed by: STUDENT IN AN ORGANIZED HEALTH CARE EDUCATION/TRAINING PROGRAM

## 2022-10-13 PROCEDURE — 97110 THERAPEUTIC EXERCISES: CPT

## 2022-10-13 PROCEDURE — 97530 THERAPEUTIC ACTIVITIES: CPT

## 2022-10-13 PROCEDURE — 85025 COMPLETE CBC W/AUTO DIFF WBC: CPT | Performed by: STUDENT IN AN ORGANIZED HEALTH CARE EDUCATION/TRAINING PROGRAM

## 2022-10-13 RX ORDER — OLANZAPINE 10 MG/1
5 INJECTION, POWDER, LYOPHILIZED, FOR SOLUTION INTRAMUSCULAR EVERY 8 HOURS PRN
Status: DISCONTINUED | OUTPATIENT
Start: 2022-10-13 | End: 2022-10-15 | Stop reason: HOSPADM

## 2022-10-13 RX ORDER — POTASSIUM CHLORIDE 750 MG/1
40 TABLET, FILM COATED, EXTENDED RELEASE ORAL ONCE
Status: COMPLETED | OUTPATIENT
Start: 2022-10-13 | End: 2022-10-13

## 2022-10-13 RX ADMIN — LEVOTHYROXINE SODIUM 50 MCG: 0.05 TABLET ORAL at 10:35

## 2022-10-13 RX ADMIN — CARVEDILOL 6.25 MG: 6.25 TABLET, FILM COATED ORAL at 18:24

## 2022-10-13 RX ADMIN — TERAZOSIN 1 MG: 1 CAPSULE ORAL at 20:09

## 2022-10-13 RX ADMIN — APIXABAN 2.5 MG: 5 TABLET, FILM COATED ORAL at 10:35

## 2022-10-13 RX ADMIN — CARVEDILOL 6.25 MG: 6.25 TABLET, FILM COATED ORAL at 10:35

## 2022-10-13 RX ADMIN — APIXABAN 2.5 MG: 5 TABLET, FILM COATED ORAL at 20:09

## 2022-10-13 RX ADMIN — POTASSIUM CHLORIDE 40 MEQ: 750 TABLET, EXTENDED RELEASE ORAL at 15:36

## 2022-10-13 NOTE — PLAN OF CARE
Problem: Adult Inpatient Plan of Care  Goal: Plan of Care Review  Outcome: Ongoing, Progressing  Flowsheets (Taken 10/13/2022 0410)  Progress: no change  Plan of Care Reviewed With: patient  Outcome Evaluation:   Pt up in chair at beginning of shift   assist x2 back to bed   Family at bedside   pt confused   will continue to monitor  Goal: Patient-Specific Goal (Individualized)  Outcome: Ongoing, Progressing  Goal: Absence of Hospital-Acquired Illness or Injury  Outcome: Ongoing, Progressing  Intervention: Identify and Manage Fall Risk  Recent Flowsheet Documentation  Taken 10/13/2022 0354 by Meche Dickey RN  Safety Promotion/Fall Prevention:   room organization consistent   safety round/check completed   nonskid shoes/slippers when out of bed  Taken 10/13/2022 0221 by Meche Dickey RN  Safety Promotion/Fall Prevention:   safety round/check completed   room organization consistent  Taken 10/12/2022 2340 by Meche Dickey RN  Safety Promotion/Fall Prevention:   room organization consistent   safety round/check completed  Taken 10/12/2022 2218 by Meche Dickey RN  Safety Promotion/Fall Prevention:   safety round/check completed   room organization consistent   nonskid shoes/slippers when out of bed  Taken 10/12/2022 2036 by Meche Dickey RN  Safety Promotion/Fall Prevention:   activity supervised   nonskid shoes/slippers when out of bed   safety round/check completed   room organization consistent  Intervention: Prevent Skin Injury  Recent Flowsheet Documentation  Taken 10/13/2022 0354 by Meche Dickey RN  Body Position: right  Taken 10/13/2022 0221 by Meche Dickey RN  Body Position: right  Taken 10/12/2022 2340 by Meche Dickey RN  Body Position: weight shifting  Taken 10/12/2022 2218 by Meche Dickey RN  Body Position: supine  Taken 10/12/2022 2036 by Meche Dickey RN  Body Position: supine  Skin Protection: adhesive use limited  Intervention:  Prevent and Manage VTE (Venous Thromboembolism) Risk  Recent Flowsheet Documentation  Taken 10/12/2022 2036 by Meche Dickey RN  Activity Management: activity adjusted per tolerance  VTE Prevention/Management:   bilateral   sequential compression devices off  Intervention: Prevent Infection  Recent Flowsheet Documentation  Taken 10/13/2022 0354 by Meche Dickey RN  Infection Prevention: rest/sleep promoted  Taken 10/13/2022 0221 by Meche Dickey RN  Infection Prevention: rest/sleep promoted  Taken 10/12/2022 2340 by Meche Dickey RN  Infection Prevention: rest/sleep promoted  Taken 10/12/2022 2218 by Meche Dickey RN  Infection Prevention: rest/sleep promoted  Taken 10/12/2022 2036 by Meche Dickey RN  Infection Prevention: rest/sleep promoted  Goal: Optimal Comfort and Wellbeing  Outcome: Ongoing, Progressing  Intervention: Provide Person-Centered Care  Recent Flowsheet Documentation  Taken 10/12/2022 2036 by Meche Dickey RN  Trust Relationship/Rapport: care explained  Goal: Readiness for Transition of Care  Outcome: Ongoing, Progressing   Goal Outcome Evaluation:  Plan of Care Reviewed With: patient        Progress: no change  Outcome Evaluation: Pt up in chair at beginning of shift; assist x2 back to bed; Family at bedside; pt confused; will continue to monitor

## 2022-10-13 NOTE — PROGRESS NOTES
Name: Rafa Bautista ADMIT: 10/8/2022   : 1928  PCP: Beverly Lino MD    MRN: [id number] LOS: 5 days   AGE/SEX: 89 y.o. male  ROOM: [facility]/     Subjective   Subjective   The patient is sitting up in his bed, eating lunch. Mentions several times how he is eager to go home. He remains out of restraints.  Family at bedside. D/w RN to try and walk with patient this afternoon.     Review of Systems   Constitutional: Negative for fatigue and fever.   Respiratory: Negative for cough and shortness of breath.    Cardiovascular: Negative for chest pain and palpitations.   Gastrointestinal: Negative for abdominal pain, nausea and vomiting.   Psychiatric/Behavioral: Negative for confusion.        Objective   Objective   Vital Signs  Temp:  [98.1 °F (36.7 °C)-98.6 °F (37 °C)] 98.5 °F (36.9 °C)  Heart Rate:  [72-84] 72  Resp:  [18-20] 20  BP: (104-145)/(70-92) 113/70  SpO2:  [90 %-98 %] 90 %  on  Flow (L/min):  [2] 2;   Device (Oxygen Therapy): nasal cannula  Body mass index is 26.53 kg/m².  Physical Exam  Constitutional:       General: He is not in acute distress.     Appearance: Normal appearance. He is not toxic-appearing.      Comments: Elderly   Cardiovascular:      Rate and Rhythm: Normal rate and regular rhythm.      Heart sounds: No murmur heard.  Pulmonary:      Effort: Pulmonary effort is normal. No respiratory distress.      Breath sounds: Normal breath sounds. No wheezing.   Abdominal:      General: Abdomen is flat. Bowel sounds are normal. There is no distension.      Palpations: Abdomen is soft.      Tenderness: There is no abdominal tenderness.   Musculoskeletal:         General: No tenderness.      Right lower leg: No edema.      Left lower leg: No edema.      Comments: B/l pitting edema- improved   Skin:     General: Skin is warm and dry.   Neurological:      Mental Status: He is alert and oriented to person, place, and time.      Motor: No weakness.       Results Review     I reviewed the  patient's new clinical results.  Results from last 7 days   Lab Units 10/13/22  0808 10/11/22  0407 10/09/22  0432 10/08/22  1924   WBC 10*3/mm3 11.98* 13.25* 12.02* 9.81   HEMOGLOBIN g/dL 12.7* 13.6 13.6 13.2   PLATELETS 10*3/mm3 172 175 166 196     Results from last 7 days   Lab Units 10/13/22  0808 10/12/22  0519 10/11/22  0407 10/09/22  1014   SODIUM mmol/L 145 145 145 144   POTASSIUM mmol/L 3.2* 3.3* 3.5 3.9   CHLORIDE mmol/L 103 104 104 105   CO2 mmol/L 32.5* 28.6 27.0 25.7   BUN mg/dL 50* 52* 54* 35*   CREATININE mg/dL 1.54* 1.92* 2.24* 1.47*   GLUCOSE mg/dL 118* 110* 114* 100*   Estimated Creatinine Clearance: 37.6 mL/min (A) (by C-G formula based on SCr of 1.54 mg/dL (H)).  Results from last 7 days   Lab Units 10/11/22  0407 10/08/22  1924   ALBUMIN g/dL 3.60 4.00   BILIRUBIN mg/dL 1.8* 2.2*   ALK PHOS U/L 80 82   AST (SGOT) U/L 45* 33   ALT (SGPT) U/L 33 31     Results from last 7 days   Lab Units 10/13/22  0808 10/12/22  0519 10/11/22  0407 10/09/22  1014 10/09/22  0432 10/08/22  1924   CALCIUM mg/dL 8.2 8.7 8.8 9.3  --  9.6   ALBUMIN g/dL  --   --  3.60  --   --  4.00   MAGNESIUM mg/dL  --   --  2.2  --  2.3 2.1     Results from last 7 days   Lab Units 10/08/22  1924   PROCALCITONIN ng/mL 0.06   LACTATE mmol/L 2.8*     COVID19   Date Value Ref Range Status   10/08/2022 Not Detected Not Detected - Ref. Range Final     No results found for: HGBA1C, POCGLU    Adult Transthoracic Echo Complete W/ Cont if Necessary Per Protocol  •  The left ventricular cavity is mildly dilated.  •  There is severe hypokinesis of the inferoseptum, anterolateral wall,   and inferolateral wall. There is moderate apical hypokinesis  •  Left ventricular ejection fraction appears to be 31 - 35%.  •  Normal right ventricular cavity size and systolic function noted.  •  The left atrial cavity is mild to moderately dilated.  •  Mild mitral valve regurgitation is present.  •  Mild tricuspid valve regurgitation is present  •  Calculated  right ventricular systolic pressure from tricuspid   regurgitation is 33 mmHg.  •  The ascending aorta is mildly enlarged at 3.8 cm  •  There is no evidence of pericardial effusion.    Scheduled Medications  apixaban, 2.5 mg, Oral, Q12H  carvedilol, 6.25 mg, Oral, BID With Meals  levothyroxine, 50 mcg, Oral, Q AM  terazosin, 1 mg, Oral, Nightly    Infusions   Diet  Diet Regular; Cardiac       I have personally reviewed:  [x]  Laboratory   []  Microbiology   []  Radiology   [x]  EKG/Telemetry   []  Cardiology/Vascular   []  Pathology   []  Records    Assessment/Plan     Active Hospital Problems    Diagnosis  POA   • **Acute metabolic encephalopathy [G93.41]  Yes   • New onset atrial fibrillation (HCC) [I48.91]  Yes   • CHF (congestive heart failure) (HCC) [I50.9]  Yes   • Hypothyroidism [E03.9]  Yes   • BPH (benign prostatic hyperplasia) [N40.0]  Yes      Resolved Hospital Problems   No resolved problems to display.       89 y.o. male admitted with Acute metabolic encephalopathy.    New atrial fibrillation with RVR  Acute congestive heart failure- precipitated by a fib likely  - cardiology consulted  - echo with reduced EF 31-35%; multiple WMAs  - rate control with coreg, cardiology is recommending medical management, no cath; lasix held for elevation in Cr  - plan to resume diuretics tomorrow per cardiology    Acute Encephalopathy  - per family at bedside he has some mild dementia at baseline but is normally A & O x4/can carry a normal conversation/is still extremely active/drives  - neuro following  - DC zyprexa and watch; family concerned zyprexa making groggy    HTN  - coreg as above, further titration as needed     Hypothyroidism  - Resume synthroid. TSH ok.     BPH  - Home regimen    · Eliquis (home med) for DVT prophylaxis.  · DNR.  · Discussed with patient, family and nursing staff.    · Anticipate discharge tbd pending clinical course hoping to get patient home with HH 1-2 days.     Jennifer Bailey,  MD [facility]  10/13/22  13:51 EDT    I wore protective equipment throughout this patient encounter including a face mask, gloves and protective eyewear.  Hand hygiene was performed before donning protective equipment and after removal when leaving the room.

## 2022-10-13 NOTE — CASE MANAGEMENT/SOCIAL WORK
Continued Stay Note  [facility]     Patient Name: Rafa Bautista  MRN: [id number]  Today's Date: 10/13/2022    Admit Date: 10/8/2022    Plan: Return home with home health and family assist   Discharge Plan     Row Name 10/13/22 7770       Plan    Plan Return home with home health and family assist    Patient/Family in Agreement with Plan yes    Provided Post Acute Provider List? Yes    Post Acute Provider List Home Health    Provided Post Acute Provider Quality & Resource List? Yes    Post Acute Provider Quality and Resource List Home Health    Delivered To Patient;Support Person    Support Person Chase strauss    Plan Comments CCP met with patient and son Chase at bedside to discuss discharge planning. Chase states that they hope for patient to return home with home health and family assist if possible. CCP discussed area HH agencies and provided link to medicare.gov area HH agencies for Chase to review. CCP to follow up with patient and family in AM to make HH referral or follow up on SNF referrals depending on what family provides. Lisa WILLAMS RN/CCP               Discharge Codes    No documentation.               Expected Discharge Date and Time     Expected Discharge Date Expected Discharge Time    Oct 14, 2022             Zaida Oliver

## 2022-10-13 NOTE — THERAPY TREATMENT NOTE
Patient Name: Rafa Bautista  : 1928    MRN: [id number]                              Today's Date: 10/13/2022       Admit Date: 10/8/2022    Visit Dx:     ICD-10-CM ICD-9-CM   1. Acute metabolic encephalopathy  G93.41 348.31   2. Atrial fibrillation with RVR (HCC)  I48.91 427.31   3. Acute pulmonary edema (HCC)  J81.0 518.4   4. Frequent falls  R29.6 V15.88     Patient Active Problem List   Diagnosis   • Acute metabolic encephalopathy   • New onset atrial fibrillation (HCC)   • CHF (congestive heart failure) (HCC)   • Hypothyroidism   • BPH (benign prostatic hyperplasia)     Past Medical History:   Diagnosis Date   • Hyperlipidemia    • Hypertension      Past Surgical History:   Procedure Laterality Date   • BACK SURGERY        General Information     Row Name 10/13/22 1624          Physical Therapy Time and Intention    Document Type therapy note (daily note)  -     Mode of Treatment individual therapy;physical therapy  -     Row Name 10/13/22 1624          General Information    Patient Profile Reviewed yes  -     Existing Precautions/Restrictions fall  -     Row Name 10/13/22 1624          Living Environment    People in Home spouse  possibly other fam, spouse unable to physically assist at current level MOD-2  -     Row Name 10/13/22 1624          Cognition    Orientation Status (Cognition) oriented to;person  -     Row Name 10/13/22 1622          Safety Issues, Functional Mobility    Safety Issues Affecting Function (Mobility) ability to follow commands;awareness of need for assistance;insight into deficits/self-awareness;judgment;positioning of assistive device;problem-solving;safety precaution awareness;safety precautions follow-through/compliance;sequencing abilities  -     Impairments Affecting Function (Mobility) balance;cognition;coordination;endurance/activity tolerance;postural/trunk control;strength  -           User Key  (r) = Recorded By, (t) = Taken By, (c) = Cosigned By     Initials Name Provider Type    Meche Cunningham PTA Physical Therapist Assistant               Mobility     Row Name 10/13/22 1628          Bed Mobility    Comment, (Bed Mobility) in chair upon entry  -     Row Name 10/13/22 1628          Sit-Stand Transfer    Sit-Stand [place] (Transfers) 2 person assist;moderate assist (50% patient effort);verbal cues;nonverbal cues (demo/gesture)  -     Assistive Device (Sit-Stand Transfers) walker, front-wheeled  -     Comment, (Sit-Stand Transfer) x2 attempts, cues for safe hand placement, c/o dizziness so sat back in chair and perf second attempt  -     Row Name 10/13/22 1628          Gait/Stairs (Locomotion)    [place] Level (Gait) 2 person assist;verbal cues;nonverbal cues (demo/gesture);minimum assist (75% patient effort);moderate assist (50% patient effort)  -     Assistive Device (Gait) walker, front-wheeled  -     Distance in Feet (Gait) 20ft, followed closely w/chair due to c/o dizziness; pt having difficulty following directions, extreme forw flexion-fam states that is not his baseline  -     Deviations/Abnormal Patterns (Gait) zeferino decreased;festinating/shuffling;stride length decreased;base of support, wide  -     Bilateral Gait Deviations forward flexed posture;heel strike decreased  -           User Key  (r) = Recorded By, (t) = Taken By, (c) = Cosigned By    Initials Name Provider Type    Meche Cunningham PTA Physical Therapist Assistant               Obj/Interventions    No documentation.                Goals/Plan    No documentation.                Clinical Impression     Row Name 10/13/22 1637          Pain    Pretreatment Pain Rating 0/10 - no pain  -     Posttreatment Pain Rating 0/10 - no pain  -     Row Name 10/13/22 1637          Plan of Care Review    Plan of Care Reviewed With patient;spouse;family  -     Outcome Evaluation Pt agreed to PT , tolerated amb 20ft assist of 2 for safety, pt having difficulty  w/sequencing and step length, forw flexed, MOD 2 STS , perf x2 due to dizziness  -     Row Name 10/13/22 1637          Therapy Assessment/Plan (PT)    Rehab Potential (PT) fair, will monitor progress closely  -     Criteria for Skilled Interventions Met (PT) yes  -     Row Name 10/13/22 1637          Vital Signs    O2 Delivery Pre Treatment room air  -     Row Name 10/13/22 1637          Positioning and Restraints    Pre-Treatment Position sitting in chair/recliner  -     Post Treatment Position chair  -     In Chair reclined;call light within reach;encouraged to call for assist;exit alarm on;with family/caregiver;notified nsg  -           User Key  (r) = Recorded By, (t) = Taken By, (c) = Cosigned By    Initials Name Provider Type    Meche Cunningham PTA Physical Therapist Assistant               Outcome Measures     Row Name 10/13/22 1647          How much help from another person do you currently need...    Turning from your back to your side while in flat bed without using bedrails? 2  -JM     Moving from lying on back to sitting on the side of a flat bed without bedrails? 2  -JM     Moving to and from a bed to a chair (including a wheelchair)? 3  -JM     Standing up from a chair using your arms (e.g., wheelchair, bedside chair)? 2  -JM     Climbing 3-5 steps with a railing? 1  -JM     To walk in hospital room? 2  -JM     AM-PAC 6 Clicks Score (PT) 12  -     Highest level of mobility 4 --> Transferred to chair/commode  -           User Key  (r) = Recorded By, (t) = Taken By, (c) = Cosigned By    Initials Name Provider Type    Meche Cunningham PTA Physical Therapist Assistant                             Physical Therapy Education     Title: PT OT SLP Therapies (In Progress)     Topic: Physical Therapy (In Progress)     Point: Mobility training (In Progress)     Learning Progress Summary           Patient Acceptance, E,D, NR by ISAAC at 10/13/2022 1648    Acceptance, E,TB, VU,DU,NR by  at  10/12/2022 1356    Acceptance, TB,E, VU,DU,NR by CS at 10/10/2022 1443   Family Acceptance, E,D, NR by  at 10/13/2022 1648    Acceptance, E,TB, VU,DU,NR by CS at 10/12/2022 1356    Acceptance, TB,E, VU,DU,NR by CS at 10/10/2022 1443                   Point: Home exercise program (In Progress)     Learning Progress Summary           Patient Acceptance, E,D, NR by  at 10/13/2022 1648    Acceptance, E,TB, VU,DU,NR by CS at 10/12/2022 1356   Family Acceptance, E,D, NR by  at 10/13/2022 1648    Acceptance, E,TB, VU,DU,NR by CS at 10/12/2022 1356                   Point: Body mechanics (In Progress)     Learning Progress Summary           Patient Acceptance, E,D, NR by  at 10/13/2022 1648    Acceptance, E,TB, VU,DU,NR by CS at 10/12/2022 1356    Acceptance, TB,E, VU,DU,NR by CS at 10/10/2022 1443   Family Acceptance, E,D, NR by  at 10/13/2022 1648    Acceptance, E,TB, VU,DU,NR by CS at 10/12/2022 1356    Acceptance, TB,E, VU,DU,NR by CS at 10/10/2022 1443                   Point: Precautions (In Progress)     Learning Progress Summary           Patient Acceptance, E,D, NR by  at 10/13/2022 1648    Acceptance, E,TB, VU,DU,NR by CS at 10/12/2022 1356   Family Acceptance, E,D, NR by  at 10/13/2022 1648    Acceptance, E,TB, VU,DU,NR by  at 10/12/2022 1356                               User Key     Initials Effective Dates Name Provider Type Discipline    ISAAC 03/07/18 -  Meche Perkins, PTA Physical Therapist Assistant PT     09/22/22 -  Tracie Luz, JONNY Physical Therapist PT              PT Recommendation and Plan     Plan of Care Reviewed With: patient, spouse, family  Outcome Evaluation: Pt agreed to PT , tolerated amb 20ft assist of 2 for safety, pt having difficulty w/sequencing and step length, forw flexed, MOD 2 STS , perf x2 due to dizziness     Time Calculation:    PT Charges     Row Name 10/13/22 1650             Time Calculation    Start Time 1600  -JM      Stop Time 1616  -JM      Time  Calculation (min) 16 min  -ISAAC      PT Received On 10/13/22  -ISAAC      PT - Next Appointment 10/14/22  -ISAAC            User Key  (r) = Recorded By, (t) = Taken By, (c) = Cosigned By    Initials Name Provider Type    Meche Cunningham PTA Physical Therapist Assistant              Therapy Charges for Today     Code Description Service Date Service Provider Modifiers Qty    05627864164 HC PT THER PROC EA 15 MIN 10/13/2022 Meche Perkins PTA GP 1    15362054374 HC PT THERAPEUTIC ACT EA 15 MIN 10/13/2022 Meche Perkins PTA GP 1          PT G-Codes  Outcome Measure Options: AM-PAC 6 Clicks Basic Mobility (PT)  AM-PAC 6 Clicks Score (PT): 12    Meche Perkins PTA  10/13/2022

## 2022-10-13 NOTE — PROGRESS NOTES
"    Patient Name: Rafa Bautista  :1928  89 y.o.      Patient Care Team:  Beverly Lino MD as PCP - General (Internal Medicine)    Chief Complaint: follow up atrial fibrillation, cardiomyopathy    Interval History: drowsy but alert and answers questions. No complaints today/ HR well controlled.        Objective   Vital Signs  Temp:  [98 °F (36.7 °C)-98.6 °F (37 °C)] 98.5 °F (36.9 °C)  Heart Rate:  [72-84] 72  Resp:  [18-20] 20  BP: (104-145)/(66-92) 113/70  No intake or output data in the 24 hours ending 10/13/22 1323  Flowsheet Rows    Flowsheet Row First Filed Value   Admission Height 182.9 cm (72\") Documented at 10/08/2022 1922   Admission Weight 90.7 kg (200 lb) Documented at 10/08/2022 1922          Physical Exam:   General Appearance:    Alert, cooperative, in no acute distress   Lungs:     Clear to auscultation.  Normal respiratory effort and rate.      Heart:    irregular rhythm and normal rate, normal S1 and S2, no murmurs, gallops or rubs.     Chest Wall:    No abnormalities observed   Abdomen:     Soft, nontender, positive bowel sounds.     Extremities:   no cyanosis, clubbing   No marked joint deformities.  Adequate musculoskeletal strength.  1+ pitting edema     Results Review:    Results from last 7 days   Lab Units 10/13/22  0808   SODIUM mmol/L 145   POTASSIUM mmol/L 3.2*   CHLORIDE mmol/L 103   CO2 mmol/L 32.5*   BUN mg/dL 50*   CREATININE mg/dL 1.54*   GLUCOSE mg/dL 118*   CALCIUM mg/dL 8.2     Results from last 7 days   Lab Units 10/08/22  1924   TROPONIN T ng/mL 0.027     Results from last 7 days   Lab Units 10/13/22  0808   WBC 10*3/mm3 11.98*   HEMOGLOBIN g/dL 12.7*   HEMATOCRIT % 38.3   PLATELETS 10*3/mm3 172     Results from last 7 days   Lab Units 10/08/22  1924   INR  1.19*     Results from last 7 days   Lab Units 10/11/22  0407   MAGNESIUM mg/dL 2.2     Results from last 7 days   Lab Units 10/11/22  0407   CHOLESTEROL mg/dL 133   TRIGLYCERIDES mg/dL 81   HDL CHOL mg/dL " 37*   LDL CHOL mg/dL 80               Medication Review:   apixaban, 2.5 mg, Oral, Q12H  carvedilol, 6.25 mg, Oral, BID With Meals  levothyroxine, 50 mcg, Oral, Q AM  terazosin, 1 mg, Oral, Nightly              Assessment & Plan   1. Acute combined systolic and diastolic heart failure - GDMT with carvedilol. He is not a good candidate for an ACE inhibitor, Entresto, or Jardiance given his renal insufficiency. BP is also borderline low. Will follow.   2. Cardiomyopathy with ejection fraction 30-35% (new). Conservative management. Would not pursue a cardiac catheterization given his age and comorbidities.   3. Atrial fibrillation with intermittent RVR - HR very well controlled today. Tolerating beta blocker and anticoagulated with apixaban.   4. Acute kidney injury - improving. Continue to hold diuretics.  5. Metabolic encephalopathy with agitation and confusion  6. Mild baseline dementia  7. Hypothyroidism - TSH within defined limits on levothyroxine.     Renal function continues to improve.   Volume status appears pretty stable. Will likely resume oral diuretics tomorrow.   Continue carvedilol. HR well controlled.     ALEJANDRO Bundy  [facility]  10/13/22  13:23 EDT

## 2022-10-13 NOTE — PLAN OF CARE
Goal Outcome Evaluation:  Plan of Care Reviewed With: patient, spouse, family           Outcome Evaluation: Pt agreed to PT , tolerated amb 20ft assist of 2 for safety, pt having difficulty w/sequencing and step length, forw flexed, MOD 2 STS , perf x2 due to dizziness, feel pt will need SNU at current level of assist , but fam possibly taking home , feel he may do better in own home cognitively, will see next session for LE exer and incr amb as eileen, re-assess DC needs  Patient was not wearing a face mask during this therapy encounter. Therapist used appropriate personal protective equipment including eye protection, mask, and gloves.  Mask used was standard procedure mask. Appropriate PPE was worn during the entire therapy session. Hand hygiene was completed before and after therapy session. Patient is not in enhanced droplet precautions.    Barb Russ, PT Tech present

## 2022-10-14 LAB
ANION GAP SERPL CALCULATED.3IONS-SCNC: 8 MMOL/L (ref 5–15)
BASOPHILS # BLD AUTO: 0.03 10*3/MM3 (ref 0–0.2)
BASOPHILS NFR BLD AUTO: 0.3 % (ref 0–1.5)
BUN SERPL-MCNC: 46 MG/DL (ref 8–23)
BUN/CREAT SERPL: 28 (ref 7–25)
CALCIUM SPEC-SCNC: 8.3 MG/DL (ref 8.2–9.6)
CHLORIDE SERPL-SCNC: 102 MMOL/L (ref 98–107)
CO2 SERPL-SCNC: 31 MMOL/L (ref 22–29)
CREAT SERPL-MCNC: 1.64 MG/DL (ref 0.76–1.27)
DEPRECATED RDW RBC AUTO: 42.9 FL (ref 37–54)
EGFRCR SERPLBLD CKD-EPI 2021: 38.8 ML/MIN/1.73
EOSINOPHIL # BLD AUTO: 0.18 10*3/MM3 (ref 0–0.4)
EOSINOPHIL NFR BLD AUTO: 1.6 % (ref 0.3–6.2)
ERYTHROCYTE [DISTWIDTH] IN BLOOD BY AUTOMATED COUNT: 13.3 % (ref 12.3–15.4)
GLUCOSE SERPL-MCNC: 120 MG/DL (ref 65–99)
HCT VFR BLD AUTO: 39.1 % (ref 37.5–51)
HGB BLD-MCNC: 12.6 G/DL (ref 13–17.7)
IMM GRANULOCYTES # BLD AUTO: 0.06 10*3/MM3 (ref 0–0.05)
IMM GRANULOCYTES NFR BLD AUTO: 0.5 % (ref 0–0.5)
LYMPHOCYTES # BLD AUTO: 1.14 10*3/MM3 (ref 0.7–3.1)
LYMPHOCYTES NFR BLD AUTO: 10 % (ref 19.6–45.3)
MCH RBC QN AUTO: 28.4 PG (ref 26.6–33)
MCHC RBC AUTO-ENTMCNC: 32.2 G/DL (ref 31.5–35.7)
MCV RBC AUTO: 88.3 FL (ref 79–97)
MONOCYTES # BLD AUTO: 1.47 10*3/MM3 (ref 0.1–0.9)
MONOCYTES NFR BLD AUTO: 13 % (ref 5–12)
NEUTROPHILS NFR BLD AUTO: 74.6 % (ref 42.7–76)
NEUTROPHILS NFR BLD AUTO: 8.47 10*3/MM3 (ref 1.7–7)
NRBC BLD AUTO-RTO: 0.1 /100 WBC (ref 0–0.2)
PLATELET # BLD AUTO: 176 10*3/MM3 (ref 140–450)
PMV BLD AUTO: 11.5 FL (ref 6–12)
POTASSIUM SERPL-SCNC: 3.3 MMOL/L (ref 3.5–5.2)
RBC # BLD AUTO: 4.43 10*6/MM3 (ref 4.14–5.8)
SODIUM SERPL-SCNC: 141 MMOL/L (ref 136–145)
WBC NRBC COR # BLD: 11.35 10*3/MM3 (ref 3.4–10.8)

## 2022-10-14 PROCEDURE — 97110 THERAPEUTIC EXERCISES: CPT

## 2022-10-14 PROCEDURE — 85025 COMPLETE CBC W/AUTO DIFF WBC: CPT | Performed by: STUDENT IN AN ORGANIZED HEALTH CARE EDUCATION/TRAINING PROGRAM

## 2022-10-14 PROCEDURE — 80048 BASIC METABOLIC PNL TOTAL CA: CPT | Performed by: STUDENT IN AN ORGANIZED HEALTH CARE EDUCATION/TRAINING PROGRAM

## 2022-10-14 PROCEDURE — 99232 SBSQ HOSP IP/OBS MODERATE 35: CPT | Performed by: NURSE PRACTITIONER

## 2022-10-14 RX ORDER — POTASSIUM CHLORIDE 750 MG/1
40 TABLET, FILM COATED, EXTENDED RELEASE ORAL ONCE
Status: COMPLETED | OUTPATIENT
Start: 2022-10-14 | End: 2022-10-14

## 2022-10-14 RX ADMIN — POTASSIUM CHLORIDE 40 MEQ: 750 TABLET, EXTENDED RELEASE ORAL at 16:36

## 2022-10-14 RX ADMIN — CARVEDILOL 6.25 MG: 6.25 TABLET, FILM COATED ORAL at 09:42

## 2022-10-14 RX ADMIN — APIXABAN 2.5 MG: 5 TABLET, FILM COATED ORAL at 21:02

## 2022-10-14 RX ADMIN — APIXABAN 2.5 MG: 5 TABLET, FILM COATED ORAL at 09:42

## 2022-10-14 RX ADMIN — LEVOTHYROXINE SODIUM 50 MCG: 0.05 TABLET ORAL at 09:42

## 2022-10-14 RX ADMIN — TERAZOSIN 1 MG: 1 CAPSULE ORAL at 21:02

## 2022-10-14 RX ADMIN — CARVEDILOL 6.25 MG: 6.25 TABLET, FILM COATED ORAL at 16:36

## 2022-10-14 RX ADMIN — Medication 10 ML: at 21:03

## 2022-10-14 NOTE — PLAN OF CARE
Goal Outcome Evaluation:           Progress: improving  Outcome Evaluation: Pt is more alert and still  less aggitated after stopping zyprexa than previously but sill has periods of confusion. PT worked with him today again, rehab recommended. family wanting to take him home with private care. CCP on this, possible dc/transfer tomorrow, pt's K replaced today, up in chair for more that half the day, VSS., safety maintained, will CTM cl

## 2022-10-14 NOTE — DISCHARGE PLACEMENT REQUEST
"Rafa Bautista (89 y.o. Male)     Date of Birth   12/26/1928    Social Security Number       Address   [address] [id number]    Home Phone   [phone]    MRN   [id number]       Mandaen   [employer]reg[employer]    Marital Status                               Admission Date   10/8/22    Admission Type   Emergency    Admitting Provider   Dayron Hancock MD    Attending Provider   Jennifer Bailey MD    Department, Room/Bed   [address], [address]       Discharge Date       Discharge Disposition       Discharge Destination                               Attending Provider: Jennifer Bailey MD    Allergies: Penicillins    Isolation: None   Infection: None   Code Status: No CPR    Ht: 182.9 cm (72\")   Wt: 88.2 kg (194 lb 6.4 oz)    Admission Cmt: None   Principal Problem: Acute metabolic encephalopathy [G93.41]                 Active Insurance as of 10/8/2022     Primary Coverage     Payor Plan Insurance Group Employer/Plan Group    MEDICARE MEDICARE A & B      Payor Plan Address Payor Plan Phone Number Payor Plan Fax Number Effective Dates    [address] [phone]  12/1/1993 - None Entered    [facility] [id number]       Subscriber Name Subscriber Birth Date Member ID       RAFA BAUTISTA 12/26/1928 [id number]           Secondary Coverage     Payor Plan Insurance Group Employer/Plan Group    [facility] SUPP [id number]     Payor Plan Address Payor Plan Phone Number Payor Plan Fax Number Effective Dates    [address]   12/1/2016 - None Entered    [facility] [id number]       Subscriber Name Subscriber Birth Date Member ID       RAFA BAUTISTA 12/26/1928 [id number]                 Emergency Contacts      (Rel.) Home Phone Work Phone Mobile Phone    Chase Bautista (Son) [phone] -- --    MicheleLing (Spouse) [phone] -- --          "

## 2022-10-14 NOTE — PROGRESS NOTES
Name: Rafa Bautista ADMIT: 10/8/2022   : 1928  PCP: Beverly Lino MD    MRN: [id number] LOS: 6 days   AGE/SEX: 89 y.o. male  ROOM: [facility]/     Subjective   Subjective   The patient is sitting in his recliner, says a few times that he is eager to go home. He remains out of restraints but was awake/up until midnight last night.  Family at bedside.     Review of Systems   Constitutional: Negative for fatigue and fever.   Respiratory: Negative for cough and shortness of breath.    Cardiovascular: Negative for chest pain and palpitations.   Gastrointestinal: Negative for abdominal pain, nausea and vomiting.   Psychiatric/Behavioral: Negative for confusion.        Objective   Objective   Vital Signs  Temp:  [97.5 °F (36.4 °C)-98.6 °F (37 °C)] 97.5 °F (36.4 °C)  Heart Rate:  [76-91] 84  Resp:  [16-18] 18  BP: (120-134)/() 128/88  SpO2:  [92 %-96 %] 92 %  on  Flow (L/min):  [2] 2;   Device (Oxygen Therapy): room air  Body mass index is 26.37 kg/m².  Physical Exam  Constitutional:       General: He is not in acute distress.     Appearance: Normal appearance. He is not toxic-appearing.      Comments: Elderly   Cardiovascular:      Rate and Rhythm: Normal rate and regular rhythm.      Heart sounds: No murmur heard.  Pulmonary:      Effort: Pulmonary effort is normal. No respiratory distress.      Breath sounds: Normal breath sounds. No wheezing.   Abdominal:      General: Abdomen is flat. Bowel sounds are normal. There is no distension.      Palpations: Abdomen is soft.      Tenderness: There is no abdominal tenderness.   Musculoskeletal:         General: No tenderness.      Right lower leg: No edema.      Left lower leg: No edema.      Comments: B/l pitting edema- improved   Skin:     General: Skin is warm and dry.   Neurological:      Mental Status: He is alert and oriented to person, place, and time.      Motor: No weakness.       Results Review     I reviewed the patient's new clinical  results.  Results from last 7 days   Lab Units 10/14/22  0928 10/13/22  0808 10/11/22  0407 10/09/22  0432   WBC 10*3/mm3 11.35* 11.98* 13.25* 12.02*   HEMOGLOBIN g/dL 12.6* 12.7* 13.6 13.6   PLATELETS 10*3/mm3 176 172 175 166     Results from last 7 days   Lab Units 10/14/22  0928 10/13/22  0808 10/12/22  0519 10/11/22  0407   SODIUM mmol/L 141 145 145 145   POTASSIUM mmol/L 3.3* 3.2* 3.3* 3.5   CHLORIDE mmol/L 102 103 104 104   CO2 mmol/L 31.0* 32.5* 28.6 27.0   BUN mg/dL 46* 50* 52* 54*   CREATININE mg/dL 1.64* 1.54* 1.92* 2.24*   GLUCOSE mg/dL 120* 118* 110* 114*   Estimated Creatinine Clearance: 35.1 mL/min (A) (by C-G formula based on SCr of 1.64 mg/dL (H)).  Results from last 7 days   Lab Units 10/11/22  0407 10/08/22  1924   ALBUMIN g/dL 3.60 4.00   BILIRUBIN mg/dL 1.8* 2.2*   ALK PHOS U/L 80 82   AST (SGOT) U/L 45* 33   ALT (SGPT) U/L 33 31     Results from last 7 days   Lab Units 10/14/22  0928 10/13/22  0808 10/12/22  0519 10/11/22  0407 10/09/22  1014 10/09/22  0432 10/08/22  1924   CALCIUM mg/dL 8.3 8.2 8.7 8.8   < >  --  9.6   ALBUMIN g/dL  --   --   --  3.60  --   --  4.00   MAGNESIUM mg/dL  --   --   --  2.2  --  2.3 2.1    < > = values in this interval not displayed.     Results from last 7 days   Lab Units 10/08/22  1924   PROCALCITONIN ng/mL 0.06   LACTATE mmol/L 2.8*     COVID19   Date Value Ref Range Status   10/08/2022 Not Detected Not Detected - Ref. Range Final     No results found for: HGBA1C, POCGLU    Adult Transthoracic Echo Complete W/ Cont if Necessary Per Protocol  •  The left ventricular cavity is mildly dilated.  •  There is severe hypokinesis of the inferoseptum, anterolateral wall,   and inferolateral wall. There is moderate apical hypokinesis  •  Left ventricular ejection fraction appears to be 31 - 35%.  •  Normal right ventricular cavity size and systolic function noted.  •  The left atrial cavity is mild to moderately dilated.  •  Mild mitral valve regurgitation is present.  •   Mild tricuspid valve regurgitation is present  •  Calculated right ventricular systolic pressure from tricuspid   regurgitation is 33 mmHg.  •  The ascending aorta is mildly enlarged at 3.8 cm  •  There is no evidence of pericardial effusion.    Scheduled Medications  apixaban, 2.5 mg, Oral, Q12H  carvedilol, 6.25 mg, Oral, BID With Meals  levothyroxine, 50 mcg, Oral, Q AM  terazosin, 1 mg, Oral, Nightly    Infusions   Diet  Diet Regular; Cardiac       I have personally reviewed:  [x]  Laboratory   []  Microbiology   []  Radiology   [x]  EKG/Telemetry   []  Cardiology/Vascular   []  Pathology   []  Records    Assessment/Plan     Active Hospital Problems    Diagnosis  POA   • **Acute metabolic encephalopathy [G93.41]  Yes   • New onset atrial fibrillation (HCC) [I48.91]  Yes   • CHF (congestive heart failure) (HCC) [I50.9]  Yes   • Hypothyroidism [E03.9]  Yes   • BPH (benign prostatic hyperplasia) [N40.0]  Yes      Resolved Hospital Problems   No resolved problems to display.       89 y.o. male admitted with Acute metabolic encephalopathy.    New atrial fibrillation with RVR  Acute congestive heart failure- precipitated by a fib likely  - cardiology consulted  - echo with reduced EF 31-35%; multiple WMAs  - rate control with coreg, cardiology is recommending medical management, no cath; lasix held for elevation in Cr  - diuretics per cardiology- not on any currently- euvolemic    Acute Encephalopathy  - per family at bedside he has some mild dementia at baseline but is normally A & O x4/can carry a normal conversation/is still extremely active/drives  - PRN zyprexa for agitation    HTN  - coreg as above, further titration as needed     Hypothyroidism  - Resume synthroid. TSH ok.     BPH  - Home regimen    · Eliquis (home med) for DVT prophylaxis.  · DNR.  · Discussed with patient, family and nursing staff.    · Anticipate discharge tbd pending clinical course PT reports patient is 2 person assist- son trying to get  24/7 home helpers set up but patient may require rehab.     Jennifer Bailey MD  [facility]  10/14/22  14:22 EDT    I wore protective equipment throughout this patient encounter including a face mask, gloves and protective eyewear.  Hand hygiene was performed before donning protective equipment and after removal when leaving the room.

## 2022-10-14 NOTE — CASE MANAGEMENT/SOCIAL WORK
Continued Stay Note  [facility]     Patient Name: Rafa Bautista  MRN: [id number]  Today's Date: 10/14/2022    Admit Date: 10/8/2022    Plan: Return home with [facility] home health   Discharge Plan     Row Name 10/14/22 6890       Plan    Plan Return home with [facility] home health    Patient/Family in Agreement with Plan yes    Plan Comments CCP met with patient and son Chase at bedside to discuss discharge planning. Chase states he prefers for patient to return home with 24/7 caregivers and home health. [facility] and a list of private pay agencies to contact to pursue 24/7 assistance. Chase also requests a raised toilet seat. CCP discussed raised toilet seats are not covered on medicare and suppliers they could obtain one, but a bedside commode would be covered and Chase is agreeable to receiving one. He has no DME provider preference and is agreeable to using [employer]. Orders placed and [employer] notified. Discussed with Dr. Bailey. Lisa WILLAMS RN/CCP               Discharge Codes    No documentation.               Expected Discharge Date and Time     Expected Discharge Date Expected Discharge Time    Oct 14, 2022             Zaida Melody

## 2022-10-14 NOTE — PLAN OF CARE
Goal Outcome Evaluation:  Plan of Care Reviewed With: patient, spouse           Outcome Evaluation: Pt agreed to PT session, but fatigued quickly, upon entry pt seemed more alert, but difficulty following directions or keeping on task, pt amb 25ft total w/seated res to complete, assist of 2 for safety, pt needs SNU , but fam concerned he will not do well out of his normal environment , he needs assist of 2 currently, not aware of his deficits which is safety risk for falls at home, will need at least 24/7 assist 2 and HH initially if SNU declined    Patient was not wearing a face mask during this therapy encounter. Therapist used appropriate personal protective equipment including eye protection, mask, and gloves.  Mask used was standard procedure mask. Appropriate PPE was worn during the entire therapy session. Hand hygiene was completed before and after therapy session. Patient is not in enhanced droplet precautions.

## 2022-10-14 NOTE — THERAPY TREATMENT NOTE
Patient Name: Rafa Bautista  : 1928    MRN: [id number]                              Today's Date: 10/14/2022       Admit Date: 10/8/2022    Visit Dx:     ICD-10-CM ICD-9-CM   1. Acute metabolic encephalopathy  G93.41 348.31   2. Atrial fibrillation with RVR (HCC)  I48.91 427.31   3. Acute pulmonary edema (HCC)  J81.0 518.4   4. Frequent falls  R29.6 V15.88     Patient Active Problem List   Diagnosis   • Acute metabolic encephalopathy   • New onset atrial fibrillation (HCC)   • CHF (congestive heart failure) (HCC)   • Hypothyroidism   • BPH (benign prostatic hyperplasia)     Past Medical History:   Diagnosis Date   • Hyperlipidemia    • Hypertension      Past Surgical History:   Procedure Laterality Date   • BACK SURGERY        General Information     Row Name 10/14/22 1729          Physical Therapy Time and Intention    Document Type therapy note (daily note)  -     Mode of Treatment individual therapy;physical therapy  -     Row Name 10/14/22 1729          General Information    Patient Profile Reviewed yes  -     Existing Precautions/Restrictions fall  -     Row Name 10/14/22 1729          Cognition    Orientation Status (Cognition) oriented to;person;place  -     Row Name 10/14/22 1729          Safety Issues, Functional Mobility    Impairments Affecting Function (Mobility) balance;cognition;coordination;endurance/activity tolerance;postural/trunk control;strength  -           User Key  (r) = Recorded By, (t) = Taken By, (c) = Cosigned By    Initials Name Provider Type    Meche Cunningham PTA Physical Therapist Assistant               Mobility     Row Name 10/14/22 1729          Sit-Stand Transfer    Sit-Stand [place] (Transfers) moderate assist (50% patient effort);verbal cues;nonverbal cues (demo/gesture)  -     Assistive Device (Sit-Stand Transfers) walker, front-wheeled  -     Row Name 10/14/22 1729          Gait/Stairs (Locomotion)    [place] Level (Gait) 2 person  assist;minimum assist (75% patient effort);moderate assist (50% patient effort);1 person to manage equipment;verbal cues;nonverbal cues (demo/gesture)  -     Assistive Device (Gait) walker, front-wheeled  -     Distance in Feet (Gait) 15ft, seated rest , then 10ft, pt c/o dizziness and fatigue, back fatigue and pain once standing, diff following directions for safety, followed w/recliner for safety and rest breaks to complete total of 25ft  -     Deviations/Abnormal Patterns (Gait) zeferino decreased;festinating/shuffling;stride length decreased;base of support, wide  -     Bilateral Gait Deviations forward flexed posture;heel strike decreased  -           User Key  (r) = Recorded By, (t) = Taken By, (c) = Cosigned By    Initials Name Provider Type    Mcehe Cunningham PTA Physical Therapist Assistant               Obj/Interventions     Row Name 10/14/22 1731          Motor Skills    Therapeutic Exercise --  LAQs, seated MIP x10-15 reps prior to amb  -           User Key  (r) = Recorded By, (t) = Taken By, (c) = Cosigned By    Initials Name Provider Type    Meche Cunningham PTA Physical Therapist Assistant               Goals/Plan    No documentation.                Clinical Impression     Row Name 10/14/22 1732          Pain    Pretreatment Pain Rating 0/10 - no pain  -     Posttreatment Pain Rating 5/10  -     Pain Location - back  -     Row Name 10/14/22 1732          Plan of Care Review    Plan of Care Reviewed With patient;spouse  -     Outcome Evaluation Pt agreed to PT session, but fatigued quickly, upon entry pt seemed more alert, but difficulty following directions or keeping on task, pt amb 25ft total w/seated res to complete, assist of 2 for safety  -     Row Name 10/14/22 1732          Therapy Assessment/Plan (PT)    Rehab Potential (PT) fair, will monitor progress closely  -     Criteria for Skilled Interventions Met (PT) yes  -     Row Name 10/14/22 1732          Vital  Signs    O2 Delivery Pre Treatment room air  -     Row Name 10/14/22 1732          Positioning and Restraints    Pre-Treatment Position sitting in chair/recliner  -     Post Treatment Position chair  -JM     In Chair reclined;call light within reach;encouraged to call for assist;exit alarm on;with family/caregiver;notified nsg  -           User Key  (r) = Recorded By, (t) = Taken By, (c) = Cosigned By    Initials Name Provider Type    Meche Cunningham PTA Physical Therapist Assistant               Outcome Measures     Row Name 10/14/22 1737          How much help from another person do you currently need...    Turning from your back to your side while in flat bed without using bedrails? 2  -JM     Moving from lying on back to sitting on the side of a flat bed without bedrails? 2  -JM     Moving to and from a bed to a chair (including a wheelchair)? 2  -JM     Standing up from a chair using your arms (e.g., wheelchair, bedside chair)? 2  -JM     Climbing 3-5 steps with a railing? 1  -JM     To walk in hospital room? 2  -JM     AM-PAC 6 Clicks Score (PT) 11  -     Highest level of mobility 4 --> Transferred to chair/commode  -           User Key  (r) = Recorded By, (t) = Taken By, (c) = Cosigned By    Initials Name Provider Type    Meche Cunningham PTA Physical Therapist Assistant                             Physical Therapy Education     Title: PT OT SLP Therapies (In Progress)     Topic: Physical Therapy (In Progress)     Point: Mobility training (In Progress)     Learning Progress Summary           Patient Acceptance, E,D, NR by ISAAC at 10/14/2022 1737    Acceptance, E,D, NR by  at 10/13/2022 1648    Acceptance, E,TB, VU,DU,NR by  at 10/12/2022 1356    Acceptance, TB,E, VU,DU,NR by  at 10/10/2022 1443   Family Acceptance, E,D, NR by ISAAC at 10/14/2022 1737    Acceptance, E,D, NR by  at 10/13/2022 1648    Acceptance, E,TB, VU,DU,NR by  at 10/12/2022 1356    Acceptance, TB,E, VU,DU,NR by  at  10/10/2022 1443                   Point: Home exercise program (In Progress)     Learning Progress Summary           Patient Acceptance, E,D, NR by  at 10/14/2022 1737    Acceptance, E,D, NR by  at 10/13/2022 1648    Acceptance, E,TB, VU,DU,NR by CS at 10/12/2022 1356   Family Acceptance, E,D, NR by  at 10/14/2022 1737    Acceptance, E,D, NR by  at 10/13/2022 1648    Acceptance, E,TB, VU,DU,NR by CS at 10/12/2022 1356                   Point: Body mechanics (In Progress)     Learning Progress Summary           Patient Acceptance, E,D, NR by  at 10/14/2022 1737    Acceptance, E,D, NR by  at 10/13/2022 1648    Acceptance, E,TB, VU,DU,NR by CS at 10/12/2022 1356    Acceptance, TB,E, VU,DU,NR by CS at 10/10/2022 1443   Family Acceptance, E,D, NR by  at 10/14/2022 1737    Acceptance, E,D, NR by  at 10/13/2022 1648    Acceptance, E,TB, VU,DU,NR by  at 10/12/2022 1356    Acceptance, TB,E, VU,DU,NR by  at 10/10/2022 1443                   Point: Precautions (In Progress)     Learning Progress Summary           Patient Acceptance, E,D, NR by  at 10/14/2022 1737    Acceptance, E,D, NR by  at 10/13/2022 1648    Acceptance, E,TB, VU,DU,NR by CS at 10/12/2022 1356   Family Acceptance, E,D, NR by  at 10/14/2022 1737    Acceptance, E,D, NR by  at 10/13/2022 1648    Acceptance, E,TB, VU,DU,NR by  at 10/12/2022 1356                               User Key     Initials Effective Dates Name Provider Type Discipline     03/07/18 -  Meche Perkins, JUAN ANTONIO Physical Therapist Assistant PT    RACHEL 09/22/22 -  Tracie Luz PT Physical Therapist PT              PT Recommendation and Plan     Plan of Care Reviewed With: patient, spouse  Outcome Evaluation: Pt agreed to PT session, but fatigued quickly, upon entry pt seemed more alert, but difficulty following directions or keeping on task, pt amb 25ft total w/seated res to complete, assist of 2 for safety     Time Calculation:    PT Charges     Row Name  10/14/22 1740             Time Calculation    Start Time 1345  -ISAAC      Stop Time 1411  -ISAAC      Time Calculation (min) 26 min  -ISAAC      PT Received On 10/14/22  -ISAAC      PT - Next Appointment 10/15/22  -ISAAC            User Key  (r) = Recorded By, (t) = Taken By, (c) = Cosigned By    Initials Name Provider Type    Meche Cunningham PTA Physical Therapist Assistant              Therapy Charges for Today     Code Description Service Date Service Provider Modifiers Qty    52707254404 HC PT THER PROC EA 15 MIN 10/13/2022 Meche Perkins PTA GP 1    78915242151 HC PT THERAPEUTIC ACT EA 15 MIN 10/13/2022 Meche Perkins PTA GP 1    14968442499 HC PT THER PROC EA 15 MIN 10/14/2022 Meche Perkins PTA GP 2    62538995953 HC PT THER SUPP EA 15 MIN 10/14/2022 Meche Perkins PTA GP 2          PT G-Codes  Outcome Measure Options: AM-PAC 6 Clicks Basic Mobility (PT)  AM-PAC 6 Clicks Score (PT): 11    Meche Perkins PTA  10/14/2022

## 2022-10-14 NOTE — PROGRESS NOTES
"   LOS: 6 days   Patient Care Team:  Beverly Lino MD as PCP - General (Internal Medicine)      Chief Complaint:   Following for atrial fibrillation, cardiomyopathy       Interval History:   Vital signs stable.  Rates well controlled, staying in the 80s at bedside.  AM labs not resulted yet, family reports that these were just drawn.  Patient is resting comfortably.  He denies any chest pain or dyspnea.  Appears euvolemic on exam.        Objective   Vital Signs  Temp:  [97.5 °F (36.4 °C)-98.6 °F (37 °C)] 97.5 °F (36.4 °C)  Heart Rate:  [72-91] 84  Resp:  [16-20] 18  BP: (113-134)/() 128/88  No intake or output data in the 24 hours ending 10/14/22 0947    Last Weight and Admission Weight        10/14/22  0542   Weight: 88.2 kg (194 lb 6.4 oz)     Flowsheet Rows    Flowsheet Row First Filed Value   Admission Height 182.9 cm (72\") Documented at 10/08/2022 1922   Admission Weight 90.7 kg (200 lb) Documented at 10/08/2022 1922              Physical Exam  Constitutional:       General: He is not in acute distress.  Cardiovascular:      Rate and Rhythm: Normal rate. Rhythm irregular.      Pulses: Normal pulses.      Heart sounds: Normal heart sounds.   Pulmonary:      Effort: Pulmonary effort is normal. No respiratory distress.      Breath sounds: Normal breath sounds.   Abdominal:      General: Bowel sounds are normal.      Palpations: Abdomen is soft.   Musculoskeletal:         General: No swelling or tenderness. Normal range of motion.      Cervical back: Neck supple.   Skin:     General: Skin is warm and dry.      Capillary Refill: Capillary refill takes less than 2 seconds.   Neurological:      Mental Status: He is alert and oriented to person, place, and time.             Results Review:      Results from last 7 days   Lab Units 10/13/22  0808 10/12/22  0519 10/11/22  0407   SODIUM mmol/L 145 145 145   POTASSIUM mmol/L 3.2* 3.3* 3.5   CHLORIDE mmol/L 103 104 104   CO2 mmol/L 32.5* 28.6 27.0   BUN " mg/dL 50* 52* 54*   CREATININE mg/dL 1.54* 1.92* 2.24*   GLUCOSE mg/dL 118* 110* 114*   CALCIUM mg/dL 8.2 8.7 8.8     Results from last 7 days   Lab Units 10/08/22  1924   TROPONIN T ng/mL 0.027     Results from last 7 days   Lab Units 10/13/22  0808 10/11/22  0407 10/09/22  0432   WBC 10*3/mm3 11.98* 13.25* 12.02*   HEMOGLOBIN g/dL 12.7* 13.6 13.6   HEMATOCRIT % 38.3 41.5 40.6   PLATELETS 10*3/mm3 172 175 166     Results from last 7 days   Lab Units 10/08/22  1924   INR  1.19*     Results from last 7 days   Lab Units 10/11/22  0407   CHOLESTEROL mg/dL 133     Results from last 7 days   Lab Units 10/11/22  0407   MAGNESIUM mg/dL 2.2     Results from last 7 days   Lab Units 10/11/22  0407   CHOLESTEROL mg/dL 133   TRIGLYCERIDES mg/dL 81   HDL CHOL mg/dL 37*   LDL CHOL mg/dL 80               Medication Review:   apixaban, 2.5 mg, Oral, Q12H  carvedilol, 6.25 mg, Oral, BID With Meals  levothyroxine, 50 mcg, Oral, Q AM  terazosin, 1 mg, Oral, Nightly             Assessment & Plan       Acute metabolic encephalopathy    New onset atrial fibrillation (HCC)    CHF (congestive heart failure) (HCC)    Hypothyroidism    BPH (benign prostatic hyperplasia)    1. Acute combined systolic and diastolic CHF: On GDMT with carvedilol.  Not felt to be a candidate for ACE inhibitor, Entresto, or Jardiance given renal insufficiency.  2. Cardiomyopathy: EF 30 to 35%.  Possibly from A. fib with rapid rates, heart rates now improved.  Conservative management was recommended.  Heart cath being deferred given age and comorbidities and renal insufficiency.  Denies chest pain or discomfort symptoms.  3. Atrial fibrillation with intermittent RVR: Heart rate control has improved.  Tolerating beta-blocker.  On low-dose apixaban.  4. DILLON: Improving.  Diuretics on hold.  Appears euvolemic.  Continue to monitor.  5. Metabolic encephalopathy with agitation and confusion  6. Mild baseline dementia  7. Hypothyroidism: TSH within normal limits on  levothyroxine            Thanks,      Naty Polo, BAILEY, APRN  10/14/22  09:47 EDT

## 2022-10-14 NOTE — PLAN OF CARE
Problem: Adult Inpatient Plan of Care  Goal: Plan of Care Review  Outcome: Ongoing, Progressing  Flowsheets (Taken 10/14/2022 0359)  Progress: no change  Plan of Care Reviewed With: patient  Outcome Evaluation:   Pt got very angry when placed back in bed   Son at bedside   Pt finally calmed down and fell asleep   pt alert to self only   pt was turned and son requested not to turn pt while asleep   Pt has been moving self in bed   Will continue to monitor  Goal: Patient-Specific Goal (Individualized)  Outcome: Ongoing, Progressing  Goal: Absence of Hospital-Acquired Illness or Injury  Outcome: Ongoing, Progressing  Intervention: Identify and Manage Fall Risk  Recent Flowsheet Documentation  Taken 10/14/2022 0353 by Meche Dickey RN  Safety Promotion/Fall Prevention:   room organization consistent   safety round/check completed   nonskid shoes/slippers when out of bed  Taken 10/14/2022 0200 by Meche Dickey RN  Safety Promotion/Fall Prevention:   room organization consistent   safety round/check completed   nonskid shoes/slippers when out of bed  Taken 10/13/2022 2356 by Meche Dickey RN  Safety Promotion/Fall Prevention:   safety round/check completed   room organization consistent   nonskid shoes/slippers when out of bed  Taken 10/13/2022 2201 by Meche Dickey RN  Safety Promotion/Fall Prevention:   safety round/check completed   room organization consistent   nonskid shoes/slippers when out of bed  Taken 10/13/2022 2009 by Meche Dickey RN  Safety Promotion/Fall Prevention:   activity supervised   nonskid shoes/slippers when out of bed   room organization consistent   safety round/check completed  Intervention: Prevent Skin Injury  Recent Flowsheet Documentation  Taken 10/14/2022 0353 by Meche Dickey RN  Body Position:   supine   weight shifting  Taken 10/14/2022 0200 by Meche Dickey RN  Body Position:   right   weight shifting  Taken 10/14/2022 0047 by Noble  Meche MARRERO RN  Body Position:   position changed independently   right  Taken 10/13/2022 2356 by Meche Dickey RN  Body Position: right  Taken 10/13/2022 2201 by Meche Dickey RN  Body Position: supine  Intervention: Prevent and Manage VTE (Venous Thromboembolism) Risk  Recent Flowsheet Documentation  Taken 10/13/2022 2009 by Meche Dickey RN  Activity Management:   activity adjusted per tolerance   up in chair  VTE Prevention/Management:   bilateral   sequential compression devices off  Intervention: Prevent Infection  Recent Flowsheet Documentation  Taken 10/14/2022 0353 by Meche Dickey RN  Infection Prevention: rest/sleep promoted  Taken 10/14/2022 0200 by Meche Dickey RN  Infection Prevention: rest/sleep promoted  Taken 10/13/2022 2356 by Meche Dickey RN  Infection Prevention: rest/sleep promoted  Taken 10/13/2022 2201 by Meche Dickey RN  Infection Prevention: rest/sleep promoted  Taken 10/13/2022 2009 by Meche Dickey RN  Infection Prevention: rest/sleep promoted  Goal: Optimal Comfort and Wellbeing  Outcome: Ongoing, Progressing  Intervention: Provide Person-Centered Care  Recent Flowsheet Documentation  Taken 10/13/2022 2009 by Meche Dickey RN  Trust Relationship/Rapport: care explained  Goal: Readiness for Transition of Care  Outcome: Ongoing, Progressing   Goal Outcome Evaluation:  Plan of Care Reviewed With: patient        Progress: no change  Outcome Evaluation: Pt got very angry when placed back in bed; Son at bedside; Pt finally calmed down and fell asleep; pt alert to self only; pt was turned and son requested not to turn pt while asleep; Pt has been moving self in bed; Will continue to monitor

## 2022-10-15 ENCOUNTER — READMISSION MANAGEMENT (OUTPATIENT)
Dept: CALL CENTER | Facility: HOSPITAL | Age: 87
End: 2022-10-15

## 2022-10-15 VITALS
RESPIRATION RATE: 18 BRPM | OXYGEN SATURATION: 95 % | HEIGHT: 72 IN | DIASTOLIC BLOOD PRESSURE: 97 MMHG | WEIGHT: 199.8 LBS | SYSTOLIC BLOOD PRESSURE: 142 MMHG | HEART RATE: 79 BPM | TEMPERATURE: 97.5 F | BODY MASS INDEX: 27.06 KG/M2

## 2022-10-15 LAB
ANION GAP SERPL CALCULATED.3IONS-SCNC: 8 MMOL/L (ref 5–15)
BASOPHILS # BLD AUTO: 0.03 10*3/MM3 (ref 0–0.2)
BASOPHILS NFR BLD AUTO: 0.3 % (ref 0–1.5)
BUN SERPL-MCNC: 46 MG/DL (ref 8–23)
BUN/CREAT SERPL: 31.7 (ref 7–25)
CALCIUM SPEC-SCNC: 8.4 MG/DL (ref 8.2–9.6)
CHLORIDE SERPL-SCNC: 105 MMOL/L (ref 98–107)
CO2 SERPL-SCNC: 30 MMOL/L (ref 22–29)
CREAT SERPL-MCNC: 1.45 MG/DL (ref 0.76–1.27)
DEPRECATED RDW RBC AUTO: 40.4 FL (ref 37–54)
EGFRCR SERPLBLD CKD-EPI 2021: 44.9 ML/MIN/1.73
EOSINOPHIL # BLD AUTO: 0.27 10*3/MM3 (ref 0–0.4)
EOSINOPHIL NFR BLD AUTO: 2.6 % (ref 0.3–6.2)
ERYTHROCYTE [DISTWIDTH] IN BLOOD BY AUTOMATED COUNT: 13.2 % (ref 12.3–15.4)
GLUCOSE SERPL-MCNC: 105 MG/DL (ref 65–99)
HCT VFR BLD AUTO: 37.1 % (ref 37.5–51)
HGB BLD-MCNC: 12.3 G/DL (ref 13–17.7)
IMM GRANULOCYTES # BLD AUTO: 0.06 10*3/MM3 (ref 0–0.05)
IMM GRANULOCYTES NFR BLD AUTO: 0.6 % (ref 0–0.5)
LYMPHOCYTES # BLD AUTO: 1.23 10*3/MM3 (ref 0.7–3.1)
LYMPHOCYTES NFR BLD AUTO: 11.9 % (ref 19.6–45.3)
MCH RBC QN AUTO: 28 PG (ref 26.6–33)
MCHC RBC AUTO-ENTMCNC: 33.2 G/DL (ref 31.5–35.7)
MCV RBC AUTO: 84.3 FL (ref 79–97)
MONOCYTES # BLD AUTO: 1.52 10*3/MM3 (ref 0.1–0.9)
MONOCYTES NFR BLD AUTO: 14.7 % (ref 5–12)
NEUTROPHILS NFR BLD AUTO: 69.9 % (ref 42.7–76)
NEUTROPHILS NFR BLD AUTO: 7.23 10*3/MM3 (ref 1.7–7)
NRBC BLD AUTO-RTO: 0 /100 WBC (ref 0–0.2)
PLATELET # BLD AUTO: 192 10*3/MM3 (ref 140–450)
PMV BLD AUTO: 10.7 FL (ref 6–12)
POTASSIUM SERPL-SCNC: 3.8 MMOL/L (ref 3.5–5.2)
RBC # BLD AUTO: 4.4 10*6/MM3 (ref 4.14–5.8)
SODIUM SERPL-SCNC: 143 MMOL/L (ref 136–145)
WBC NRBC COR # BLD: 10.34 10*3/MM3 (ref 3.4–10.8)

## 2022-10-15 PROCEDURE — 80048 BASIC METABOLIC PNL TOTAL CA: CPT | Performed by: STUDENT IN AN ORGANIZED HEALTH CARE EDUCATION/TRAINING PROGRAM

## 2022-10-15 PROCEDURE — 85025 COMPLETE CBC W/AUTO DIFF WBC: CPT | Performed by: STUDENT IN AN ORGANIZED HEALTH CARE EDUCATION/TRAINING PROGRAM

## 2022-10-15 PROCEDURE — 99232 SBSQ HOSP IP/OBS MODERATE 35: CPT | Performed by: INTERNAL MEDICINE

## 2022-10-15 RX ORDER — CARVEDILOL 6.25 MG/1
6.25 TABLET ORAL 2 TIMES DAILY WITH MEALS
Qty: 60 TABLET | Refills: 0 | Status: SHIPPED | OUTPATIENT
Start: 2022-10-15 | End: 2022-11-14 | Stop reason: SDUPTHER

## 2022-10-15 RX ORDER — FUROSEMIDE 40 MG/1
40 TABLET ORAL DAILY PRN
Qty: 30 TABLET | Refills: 0 | Status: SHIPPED | OUTPATIENT
Start: 2022-10-15 | End: 2022-11-09

## 2022-10-15 RX ADMIN — CARVEDILOL 6.25 MG: 6.25 TABLET, FILM COATED ORAL at 08:40

## 2022-10-15 RX ADMIN — LEVOTHYROXINE SODIUM 50 MCG: 0.05 TABLET ORAL at 08:40

## 2022-10-15 RX ADMIN — APIXABAN 2.5 MG: 5 TABLET, FILM COATED ORAL at 08:40

## 2022-10-15 NOTE — PROGRESS NOTES
[facility] Follow Up    Chief Complaint: Follow up CHF    Interval History: Feeling well today.  He is about to be discharged home.  The patient's family is concerned about the onset of bilateral ankle and pedal edema.  They admit this seems to worsen during the daytime after he is been up out of bed.    Objective:     Objective:  Temp:  [97.5 °F (36.4 °C)-98.4 °F (36.9 °C)] 98.1 °F (36.7 °C)  Heart Rate:  [76-84] 82  Resp:  [17-18] 18  BP: ()/(74-90) 126/90     Intake/Output Summary (Last 24 hours) at 10/15/2022 0737  Last data filed at 10/14/2022 0936  Gross per 24 hour   Intake 140 ml   Output --   Net 140 ml     Body mass index is 27.1 kg/m².      10/13/22  0421 10/14/22  0542 10/15/22  0517   Weight: 88.7 kg (195 lb 9.6 oz) 88.2 kg (194 lb 6.4 oz) 90.6 kg (199 lb 12.8 oz)     Weight change: 2.449 kg (5 lb 6.4 oz)      Physical Exam:   General : Alert, cooperative, in no acute distress.  Neuro: Alert,cooperative and oriented.  Lungs: CTAB. Normal respiratory effort and rate.  CV: Regular rate and rhythm, normal S1 and S2, no murmurs, gallops or rubs.  ABD: Soft, nontender, nondistended. Positive bowel sounds.  Extr: 1+ bilateral ankle and pedal edema    Lab Review:   Results from last 7 days   Lab Units 10/15/22  0432 10/14/22  0928 10/12/22  0519 10/11/22  0407 10/09/22  1014 10/08/22  1924   SODIUM mmol/L 143 141   < > 145   < > 147*   POTASSIUM mmol/L 3.8 3.3*   < > 3.5   < > 3.9   CHLORIDE mmol/L 105 102   < > 104   < > 106   CO2 mmol/L 30.0* 31.0*   < > 27.0   < > 25.6   BUN mg/dL 46* 46*   < > 54*   < > 30*   CREATININE mg/dL 1.45* 1.64*   < > 2.24*   < > 1.35*   GLUCOSE mg/dL 105* 120*   < > 114*   < > 112*   CALCIUM mg/dL 8.4 8.3   < > 8.8   < > 9.6   AST (SGOT) U/L  --   --   --  45*  --  33   ALT (SGPT) U/L  --   --   --  33  --  31    < > = values in this interval not displayed.     Results from last 7 days   Lab Units 10/08/22  1924   TROPONIN T ng/mL 0.027     Results  from last 7 days   Lab Units 10/15/22  0432 10/14/22  0928   WBC 10*3/mm3 10.34 11.35*   HEMOGLOBIN g/dL 12.3* 12.6*   HEMATOCRIT % 37.1* 39.1   PLATELETS 10*3/mm3 192 176     Results from last 7 days   Lab Units 10/08/22  1924   INR  1.19*     Results from last 7 days   Lab Units 10/11/22  0407 10/09/22  0432   MAGNESIUM mg/dL 2.2 2.3     Results from last 7 days   Lab Units 10/11/22  0407 10/09/22  0432   CHOLESTEROL mg/dL 133 136   TRIGLYCERIDES mg/dL 81 68   HDL CHOL mg/dL 37* 43     Results from last 7 days   Lab Units 10/08/22  1924   PROBNP pg/mL 9,434.0*     Results from last 7 days   Lab Units 10/08/22  1924   TSH uIU/mL 4.030     I reviewed the patient's new clinical results.  I personally viewed and interpreted the patient's EKG  Current Medications:   Scheduled Meds:apixaban, 2.5 mg, Oral, Q12H  carvedilol, 6.25 mg, Oral, BID With Meals  levothyroxine, 50 mcg, Oral, Q AM  terazosin, 1 mg, Oral, Nightly      Continuous Infusions:     Allergies:  Allergies   Allergen Reactions   • Penicillins        Assessment/Plan:     1. Acute systolic and diastolic congestive heart failure.  Resolved.  Off of any diuretics at this time.  2. Cardiomyopathy.  EF of 30-35%.  On carvedilol.  No ACE or ARB due to renal insufficiency.  3. Atrial fibrillation.  Rate controlled on carvedilol.  On anticoagulation with apixaban.  4. DILLON.  Improved..  Resolved.  5. Metabolic encephalopathy  6. Hypothyroidism  7. Dementia    -Plans for discharge today noted.    -We will arrange for 1 week office follow-up.    Rosalee Song MD  10/15/22  07:37 EDT

## 2022-10-15 NOTE — DISCHARGE SUMMARY
[facility]  [phone]    DISCHARGE SUMMARY  [facility]    Patient Identification:  Name: Rafa Bautista  Age: 89 y.o.  Sex: male  :  1928  MRN: [id number]  Primary Care Physician: Beverly Lino MD    Admit date: 10/8/2022  Discharge date and time:      Discharge Diagnoses:  Acute metabolic encephalopathy    New onset atrial fibrillation (HCC)    CHF (congestive heart failure) (HCC)    Hypothyroidism    BPH (benign prostatic hyperplasia)       History of present illness from H&P:    Mr. Bautista is a 89 y.o. former smoker with a history of BPH and hypothyroidism that presents to [facility] due to weakness, confusion, and shortness of breath.  HPI has been obtained by family at bedside.  Patient resides at home with his wife and for the last 2 to 3 weeks, they have noticed worsening confusion, weakness, and shortness of breath.  Son at bedside reports that approximately a month ago he was self-sufficient and was able to mow his own yard.  They have noted shortness of breath with exertion as well as lower extremity edema.  They report no previous cardiac history.  Wife denies any recent diarrhea or complaints of abdominal pain.  They have not noticed any fever or heard him complain of any chills.       In the emergency department, he became very agitated and received Geodon.  CT of the head was performed showed no evidence of acute intracranial pathology but was limited due to excessive motion.  Chest x-ray showed bibasilar atelectasis and/or pneumonia and bilateral pulmonary vascular engorgement versus edema.  Labs obtained show a proBNP of 9434, glucose 112, sodium 147, anion gap 15.4, creat 1.35, BUN 30, lactate 2.8, and procalcitonin 0.06.  Urinalysis shows 6-12 whites.  Upon presentation, he was found to be in atrial fibrillation.  We were asked to admit the patient for new onset congestive heart failure as well as new onset  atrial fibrillation.    Hospital Course:     89-year-old gentleman who was admitted with altered mental status found to have new onset atrial fibrillation likely causing new onset acute congestive heart failure.  He was seen in consultation by cardiology and was given diuretics on admission.  Volume status has improved according to the family with the decrease leg edema and improved respiratory status.  The patient has some underlying dementia and had significant delirium during the hospital stay but it is improving each day and is much better today.  Family is arranged for 24-hour care at home.    Patient had atrial fibrillation with rapid ventricular response.  And he will leave with Eliquis 2.5 twice daily.  He does have a slightly elevated creatinine and his age is 89.  Patient has an ejection fraction of 31 to 35%.  Recommend cardiology follow-up outpatient.        The patient was seen and examined on the day of discharge.    Consults:   Consults     Date and Time Order Name Status Description    10/9/2022  1:13 AM Inpatient Neurology Consult General Completed     10/8/2022  8:58 PM Inpatient Cardiology Consult Completed     10/8/2022  8:29 PM [facility] (on-call MD unless specified) Details            Results from last 7 days   Lab Units 10/15/22  0432   WBC 10*3/mm3 10.34   HEMOGLOBIN g/dL 12.3*   HEMATOCRIT % 37.1*   PLATELETS 10*3/mm3 192       Results from last 7 days   Lab Units 10/15/22  0432   SODIUM mmol/L 143   POTASSIUM mmol/L 3.8   CHLORIDE mmol/L 105   CO2 mmol/L 30.0*   BUN mg/dL 46*   CREATININE mg/dL 1.45*   GLUCOSE mg/dL 105*   CALCIUM mg/dL 8.4       Significant Diagnostic Studies:   WBC   Date Value Ref Range Status   10/15/2022 10.34 3.40 - 10.80 10*3/mm3 Final     Hemoglobin   Date Value Ref Range Status   10/15/2022 12.3 (L) 13.0 - 17.7 g/dL Final     Hematocrit   Date Value Ref Range Status   10/15/2022 37.1 (L) 37.5 - 51.0 % Final     Platelets   Date Value Ref Range Status   10/15/2022 192 140  - 450 10*3/mm3 Final     Sodium   Date Value Ref Range Status   10/15/2022 143 136 - 145 mmol/L Final     Potassium   Date Value Ref Range Status   10/15/2022 3.8 3.5 - 5.2 mmol/L Final     Comment:     Slight hemolysis detected by analyzer. Results may be affected.     Chloride   Date Value Ref Range Status   10/15/2022 105 98 - 107 mmol/L Final     CO2   Date Value Ref Range Status   10/15/2022 30.0 (H) 22.0 - 29.0 mmol/L Final     BUN   Date Value Ref Range Status   10/15/2022 46 (H) 8 - 23 mg/dL Final     Creatinine   Date Value Ref Range Status   10/15/2022 1.45 (H) 0.76 - 1.27 mg/dL Final     Glucose   Date Value Ref Range Status   10/15/2022 105 (H) 65 - 99 mg/dL Final     Calcium   Date Value Ref Range Status   10/15/2022 8.4 8.2 - 9.6 mg/dL Final     No results found for: AST, ALT, ALKPHOS  No results found for: APTT, INR  No results found for: COLORU, CLARITYU, SPECGRAV, PHUR, PROTEINUR, GLUCOSEU, KETONESU, BLOODU, NITRITE, LEUKOCYTESUR, BILIRUBINUR, UROBILINOGEN, RBCUA, WBCUA, BACTERIA, UACOMMENT  No results found for: TROPONINT, TROPONINI, BNP  No components found for: HGBA1C;2  No components found for: TSH;2    Imaging Results (All)     Procedure Component Value Units Date/Time    CT Head Without Contrast [[id number]] Collected: 10/08/22 2101     Updated: 10/08/22 2108    Narrative:      CT HEAD WITHOUT CONTRAST     CLINICAL HISTORY: Altered mental status for 2 weeks.     TECHNIQUE: CT scan of the head was obtained with 3 mm axial soft tissue  algorithm and 2 mm bone algorithm images. No intravenous contrast was  administered. Sagittal and coronal reconstructions were obtained.     COMPARISON: CT head dated 06/07/2019.     FINDINGS:       Apparently, the patient was unable to cooperate. The submitted images  are nondiagnostic with excessive amount of motion artifact. Apparently,  the patient was unable to cooperate for any further imaging. There is no  gross evidence for acute intracranial pathology. The  ventricles, sulci,  and cisterns are age appropriate. Mild changes of chronic small vessel  ischemic phenomena are identified.       Impression:         The submitted images are nondiagnostic due to excessive amount of motion  artifact. There is no gross evidence for acute intracranial pathology.  The findings as well as the marked limitations of this examination were  directly discussed with Dr. Cain on 10/08/2022 at approximately 8:40  PM. I recommend repeat imaging once patient is more able to fully  cooperate for the exam.           Radiation dose reduction techniques were utilized, including automated  exposure control and exposure modulation based on body size.     This report was finalized on 10/8/2022 9:05 PM by Dr. Fermín Francois M.D.       XR Chest 1 View [[id number]] Collected: 10/08/22 1958     Updated: 10/08/22 2007    Narrative:      EXAMINATION: SINGLE VIEW CHEST RADIOGRAPH     HISTORY: 89-year-old male with history of shortness of air and weakness.     FINDINGS: An upright AP portable chest radiograph was obtained. No prior  chest radiograph is available for comparison. The lungs are low in  volume and there is hazy opacification at both lung bases. There is also  prominence of the bilateral perihilar pulmonary vasculature. The  diaphragm is silhouetted. There is obscuration of the bilateral  costophrenic angles.       Impression:      Low lung volumes with bibasilar atelectasis and/or pneumonia  and bilateral pulmonary vascular engorgement versus edema.     This report was finalized on 10/8/2022 8:04 PM by Dr. Yang Hayward M.D.         No results found for: SITE, ALLENTEST, PHART, DBF8QAK, PO2ART, QDC5ELW, BASEEXCESS, Z3DAOLYU, HGBBG, HCTABG, OXYHEMOGLOBI, METHHGBN, CARBOXYHGB, CO2CT, BAROMETRIC, MODALITY, FIO2       Discharge Medications      New Medications      Instructions Start Date   apixaban 2.5 MG tablet tablet  Commonly known as: ELIQUIS   2.5 mg, Oral, Every 12 Hours Scheduled       carvedilol 6.25 MG tablet  Commonly known as: COREG   6.25 mg, Oral, 2 Times Daily With Meals      furosemide 40 MG tablet  Commonly known as: Lasix   40 mg, Oral, Daily PRN         Continue These Medications      Instructions Start Date   levothyroxine 50 MCG tablet  Commonly known as: SYNTHROID, LEVOTHROID   50 mcg, Oral, Every Early Morning      terazosin 1 MG capsule  Commonly known as: HYTRIN   1 mg, Oral, Nightly               Patient Instructions:       No future appointments.      Follow-up Information     Beverly Lino MD Follow up in 1 week(s).    Specialty: Internal Medicine  Contact information:  [address]  [address]  [facility] [address]  [phone]             Asael Brooke MD Follow up in 2 week(s).    Specialty: Nephrology  Why: chronic kidney disease  Contact information:  [address]  [facility] [address]  [phone]             Hannah Marr MD Follow up in 2 week(s).    Specialty: Cardiology  Contact information:  [address]  [address]  [facility] [address]  [phone]                         Discharge Order (From admission, onward)     Start     Ordered    10/15/22 1159  Discharge patient  Once        Expected Discharge Date: 10/15/22    Discharge Disposition: Home or Self Care    Physician of Record for Attribution - Please select from Treatment Team: KIM DIAS [[id number]]    Review needed by CMO to determine Physician of Record: No       Question Answer Comment   Physician of Record for Attribution - Please select from Treatment Team KIM DIAS    Review needed by CMO to determine Physician of Record No        10/15/22 1159                Diet Order   Procedures   • Diet Regular; Cardiac         Discharge instructions:  Follow up with your primary care provider in 1-2 weeks with a cbc and cmp         Total time spent discharging patient including evaluation, post hospitalization follow up,  medication and post  hospitalization instructions and education, total time exceeds 30 minutes.    Signed:  Alfred Crespo MD  10/15/2022  12:55 EDT

## 2022-10-15 NOTE — PLAN OF CARE
Goal Outcome Evaluation:  Plan of Care Reviewed With: patient        Progress: no change  Outcome Evaluation: Calm and cooperative this shift. Remains oriented to self only. VSS. Ambulating to BR with assist x 1. Awaiting discharge plan. Rebab vs home with family.

## 2022-10-16 NOTE — CASE MANAGEMENT/SOCIAL WORK
Case Management Discharge Note      Final Note: home (via private auto with son) on 10/15/22 with [facility], called and spoke with Sydnie with [facility] and verified that they are following. [facility] has been notified to provide BSC.    Provided Post Acute Provider List?: Yes  Post Acute Provider List: Home Health  Provided Post Acute Provider Quality & Resource List?: Yes  Post Acute Provider Quality and Resource List: Home Health  Delivered To: Patient, Support Person  Support Person: Chase strauss  Method of Delivery: In person    Selected Continued Care - Discharged on 10/15/2022 Admission date: 10/8/2022 - Discharge disposition: Home or Self Care    Destination    No services have been selected for the patient.              Durable Medical Equipment Coordination complete.    Service Provider Selected Services Address Phone Fax Patient Preferred    [facility] [address] [facility] [address] [phone] --          Dialysis/Infusion    No services have been selected for the patient.              Home Medical Care Coordination complete.    Service Provider Selected Services Address Phone Fax Patient Preferred    [facility] - [facility] [address]  [address] [address] [phone] --          Therapy    No services have been selected for the patient.              Community Resources    No services have been selected for the patient.              Community & DME    No services have been selected for the patient.                  Transportation Services  Private: Car    Final Discharge Disposition Code: 06 - home with home health care

## 2022-10-16 NOTE — OUTREACH NOTE
Prep Survey    Flowsheet Row Responses   [facility] facility patient discharged from? [facility]   Is LACE score < 7 ? No   Emergency Room discharge w/ pulse ox? No   Eligibility Readm Mgmt   Discharge diagnosis Acute metabolic encephalopathy   Does the patient have one of the following disease processes/diagnoses(primary or secondary)? CHF   Does the patient have Home health ordered? Yes   What is the Home health agency?  [facility]   Is there a DME ordered? Yes   What DME was ordered? Commode chair   Prep survey completed? Yes          FARAZ MAY - Registered Nurse

## 2022-10-19 ENCOUNTER — READMISSION MANAGEMENT (OUTPATIENT)
Dept: CALL CENTER | Facility: HOSPITAL | Age: 87
End: 2022-10-19

## 2022-10-19 NOTE — OUTREACH NOTE
CHF Week 1 Survey    Flowsheet Row Responses   [facility] patient discharged from? [place]   Does the patient have one of the following disease processes/diagnoses(primary or secondary)? CHF   CHF Week 1 attempt successful? Yes   Call start time [phone]   Call end time 0859   Discharge diagnosis Acute metabolic encephalopathy   Meds reviewed with patient/caregiver? Yes   Is the patient having any side effects they believe may be caused by any medication additions or changes? No   Does the patient have all medications ordered at discharge? Yes   Is the patient taking all medications as directed (includes completed medication regime)? Yes   Does the patient have a primary care provider?  Yes   Does the patient have an appointment with their PCP within 7 days of discharge? No   What is preventing the patient from scheduling follow up appointments within 7 days of discharge? Haven't had time   Nursing Interventions Educated patient on importance of making appointment   Has the patient kept scheduled appointments due by today? N/A   What is the Home health agency?  [facility]   Has home health visited the patient within 72 hours of discharge? Yes   What DME was ordered? Pt states he doesn't need a commode chair    Pulse Ox monitoring None   Did the patient receive a copy of their discharge instructions? Yes   Nursing interventions Reviewed instructions with patient   What is the patient's perception of their health status since discharge? Same   Nursing interventions Nurse provided patient education   Is the patient able to teach back signs and symptoms of worsening condition? (i.e. weight gain, shortness of air, etc.) Yes   If the patient is a current smoker, are they able to teach back resources for cessation? Not a smoker   Is the patient/caregiver able to teach back the hierarchy of who to call/visit for symptoms/problems? PCP, Specialist, Home health nurse, Urgent Care, ED, 911 Yes   Is the patient  "able to teach back Heart Failure Zones? Yes   CHF Nursing Interventions Education provided on various zones   CHF Zone this Call Green Zone   Green Zone Physical activity level is normal for you, No new swelling -  feet, ankles and legs look normal for you, Patient reports doing well, No new or worsening shortness of breath, No chest pain  [Reports he is doing about the same - states he \"doesn't pay attention to that stuff\" when instructed on weight checks-spouse stated he is doing well ]   Green Zone Interventions Daily weight check, Meds as directed, Follow up visits planned    CHF Week 1 call completed? Yes   Call end time [phone]          ANYA MISHRA - Registered Nurse  "

## 2022-10-28 ENCOUNTER — READMISSION MANAGEMENT (OUTPATIENT)
Dept: CALL CENTER | Facility: HOSPITAL | Age: 87
End: 2022-10-28

## 2022-10-28 NOTE — OUTREACH NOTE
CHF Week 2 Survey    Flowsheet Row Responses   [facility] patient discharged from? [facility]   Does the patient have one of the following disease processes/diagnoses(primary or secondary)? CHF   Week 2 attempt successful? Yes   Call start time 0953   Call end time 1004   Discharge diagnosis Acute metabolic encephalopathy, new onset afib, CHF   Is patient permission given to speak with other caregiver? Yes   List who call center can speak with Chase Bautista Son    Person spoke with today (if not patient) and relationship Spoke briefly to patient and then called his son Chase.    Meds reviewed with patient/caregiver? Yes  [Son reports that he does reminder calls to patient to take his meds from the planner. ]   Does the patient have all medications ordered at discharge? Yes   Is the patient taking all medications as directed (includes completed medication regime)? Yes   Does the patient have a primary care provider?  Yes   Does the patient have an appointment with their PCP within 7 days of discharge? No   Comments regarding PCP Beverly Lino MD PCP   What is preventing the patient from scheduling follow up appointments within 7 days of discharge? --  [Son reports that he was giving patient a chance to get a bit stronger before scheduling appts. ]   Nursing Interventions Educated patient on importance of making appointment, Advised patient to make appointment   Has the patient kept scheduled appointments due by today? N/A   Comments Encouraged to schedule f/u with PCP, Dr Brooke and Dr Marr   What is the Home health agency?  [facility] home health   Has home health visited the patient within 72 hours of discharge? Yes   Pulse Ox monitoring None   Psychosocial issues? Yes   Psychosocial comments Patient was pleasant on the phone but noted confused. Patient did not remember that he was in the hospital. Son called for follow up.    Did the patient receive a copy of their discharge instructions? Yes    What is the patient's perception of their health status since discharge? Improving   Nursing interventions Nurse provided patient education   Is the patient able to teach back signs and symptoms of worsening condition? (i.e. weight gain, shortness of air, etc.) Yes   If the patient is a current smoker, are they able to teach back resources for cessation? Not a smoker   Is the patient/caregiver able to teach back the hierarchy of who to call/visit for symptoms/problems? PCP, Specialist, Home health nurse, Urgent Care, ED, 911 Yes   CHF Zone this Call Green Zone   Green Zone Patient reports doing well, No new or worsening shortness of breath, Weight check stable  [Discussed with son the prn lasix order and when for patient to take. ]   Green Zone Interventions Daily weight check, Meds as directed, Follow up visits planned   CHF Week 2 call completed? Yes   Call end time 1004          BOLIVRA GRADY - Registered Nurse

## 2022-11-07 ENCOUNTER — OFFICE VISIT (OUTPATIENT)
Dept: CARDIOLOGY | Facility: CLINIC | Age: 87
End: 2022-11-07

## 2022-11-07 VITALS
OXYGEN SATURATION: 96 % | HEART RATE: 86 BPM | WEIGHT: 208 LBS | BODY MASS INDEX: 28.17 KG/M2 | SYSTOLIC BLOOD PRESSURE: 134 MMHG | HEIGHT: 72 IN | DIASTOLIC BLOOD PRESSURE: 70 MMHG

## 2022-11-07 DIAGNOSIS — N17.9 AKI (ACUTE KIDNEY INJURY): ICD-10-CM

## 2022-11-07 DIAGNOSIS — I50.21 ACUTE SYSTOLIC CONGESTIVE HEART FAILURE: Primary | ICD-10-CM

## 2022-11-07 DIAGNOSIS — I48.91 NEW ONSET ATRIAL FIBRILLATION: ICD-10-CM

## 2022-11-07 DIAGNOSIS — E03.9 HYPOTHYROIDISM, UNSPECIFIED TYPE: ICD-10-CM

## 2022-11-07 PROCEDURE — 99214 OFFICE O/P EST MOD 30 MIN: CPT | Performed by: NURSE PRACTITIONER

## 2022-11-07 PROCEDURE — 93000 ELECTROCARDIOGRAM COMPLETE: CPT | Performed by: NURSE PRACTITIONER

## 2022-11-07 RX ORDER — AMIODARONE HYDROCHLORIDE 200 MG/1
200 TABLET ORAL DAILY
Qty: 30 TABLET | Refills: 5 | Status: SHIPPED | OUTPATIENT
Start: 2022-11-07 | End: 2022-12-05

## 2022-11-07 NOTE — PROGRESS NOTES
[facility] CARDIOLOGY  [address]  [facility] [address]  [facility] [address]  Phone: [phone]      Patient Name: Rafa Bautista  :1928  Age: 89 y.o.  Primary Cardiologist: Hannah Marr MD  Encounter Provider:  ALEJANDRO Smith      Chief Complaint     Chief Complaint: Follow-up      SUBJECTIVE     History of Present Illness:  Rafa Bautista is a 89 y.o.  male whose medical history includes hypertension and hyperlipidemia.  He was seen in consultation by Dr. Marr in 2022 for acute systolic heart failure.     22 Follow-up:  He is here for hospital follow-up and I am seeing him for the first time today.  In mid 2022 he presented to the ER with worsening weakness and confusion over the past 2 to 3 weeks; he was found to be in A. fib with heart rate 117.  Chest x-ray showed pneumonia versus bilateral pulmonary vascular engorgement. He was also noted to have DILLON.  Was discharged home on 2.5 mg apixaban twice daily, 6.25 mg carvedilol twice daily, 40 mg Lasix as needed. Since hospital discharge, his weight went up to 212 lbs. He took two doses of 40 mg lasix and his weight went down to 204 lbs; today is weight is still up 9 lbs since discharge. His breathing is comfortable and he has no orthopnea but he continues to have leg swelling. His appetite is good. He denies chest pain, palpitations, or lightheadedness. His BP at home is 120/80s with HR 80s.     Below is a summary of pertinent cardiology findings:  · In mid 2022 he presented to the ER with worsening weakness and confusion over the past 2 to 3 weeks; he was found to be in A. fib with heart rate 117.  Chest x-ray showed pneumonia versus bilateral pulmonary vascular engorgement.  His echocardiogram showed EF 30 to 35% with regional wall motion abnormalities.  Coronary angiography was deferred due to his age and frailty.  He was also noted to have DILLON.  Was discharged home on  2.5 mg apixaban twice daily, 6.25 mg carvedilol twice daily, 40 mg Lasix as needed.  • October 2022 echocardiogram showed EF 31 to 35%, severe hypokinesis of the inferior and lateral walls, moderate apical hypokinesis, normal RV cavity size and systolic function, mildly dilated LV cavity, mild to moderately dilated left atrial cavity, mild mitral valve regurgitation, mild tricuspid valve regurgitation, RVSP 33 mmHg, ascending aorta mildly dilated at 3.8 cm, and no evidence of pericardial effusion    Past Medical History:   Diagnosis Date   • Hyperlipidemia    • Hypertension        Past Surgical History:  •  has a past surgical history that includes Back surgery.     Social History     Socioeconomic History   • Marital status:    Tobacco Use   • Smoking status: Former   Substance and Sexual Activity   • Drug use: No   • Sexual activity: Defer         Review of Systems     Review of Systems   Constitutional: Positive for weight gain.   Cardiovascular: Positive for leg swelling. Negative for chest pain, claudication, cyanosis, dyspnea on exertion, irregular heartbeat, near-syncope, orthopnea, palpitations, paroxysmal nocturnal dyspnea and syncope.       Medications     Allergies as of 11/07/2022 - Reviewed 11/07/2022   Allergen Reaction Noted   • Penicillins  11/04/2017       Current Outpatient Medications on File Prior to Visit   Medication Sig   • apixaban (ELIQUIS) 2.5 MG tablet tablet Take 1 tablet by mouth Every 12 (Twelve) Hours. Indications: Atrial Fibrillation   • carvedilol (COREG) 6.25 MG tablet Take 1 tablet by mouth 2 (Two) Times a Day With Meals for 30 days.   • levothyroxine (SYNTHROID, LEVOTHROID) 50 MCG tablet Take 1 tablet by mouth Every Morning.   • terazosin (HYTRIN) 1 MG capsule Take 1 mg by mouth Every Night.   • [DISCONTINUED] furosemide (Lasix) 40 MG tablet Take 1 tablet by mouth Daily As Needed (increased wieght by 5 lbs) for up to 30 days.     No current facility-administered medications  "on file prior to visit.          OBJECTIVE     Vital Signs:   /70   Pulse 86   Ht 182.9 cm (72\")   Wt 94.3 kg (208 lb)   SpO2 96%   BMI 28.21 kg/m²       Weight:  Wt Readings from Last 3 Encounters:   11/07/22 94.3 kg (208 lb)   10/15/22 90.6 kg (199 lb 12.8 oz)   06/07/19 104 kg (230 lb)     Body mass index is 28.21 kg/m².      Physical Exam     Physical Exam  Constitutional:       General: He is not in acute distress.  HENT:      Head: Normocephalic and atraumatic.      Mouth/Throat:      Mouth: Mucous membranes are moist.   Eyes:      General: No scleral icterus.     Extraocular Movements: Extraocular movements intact.      Conjunctiva/sclera: Conjunctivae normal.      Pupils: Pupils are equal, round, and reactive to light.   Cardiovascular:      Rate and Rhythm: Normal rate. Rhythm irregular.      Pulses: Normal pulses.      Heart sounds: S1 normal and S2 normal.   Pulmonary:      Effort: No respiratory distress.      Breath sounds: Normal breath sounds. No wheezing, rhonchi or rales.   Abdominal:      General: Bowel sounds are normal. There is no distension.      Palpations: Abdomen is soft.      Tenderness: There is no abdominal tenderness.   Musculoskeletal:         General: Normal range of motion.      Cervical back: Normal range of motion and neck supple.      Right lower leg: Edema present.      Left lower leg: Edema present.   Skin:     General: Skin is warm and dry.      Coloration: Skin is not jaundiced.   Neurological:      Mental Status: He is alert and oriented to person, place, and time.   Psychiatric:         Mood and Affect: Mood normal.         Reviewed Data     Result Review :  The following data was reviewed by ALEJANDRO Smith on 11/09/22:  • Labs 10/11/2022:  cr 2.2, K 3.5, AST 45, Tbil 1.8, otherwise unremarkable CMP, Mg 2.2, Chol 133, HDL 37, LDL 80, Trig 81, Hgb 13.6, Plt 175  • Labs 10/15/22:  cr 1.4, K 3.8, otherwise unremarkable BMP, hgb 12.3, plt 192      ECG " 12 Lead    Date/Time: 11/7/2022 2:32 PM  Performed by: Elsie Le APRN  Authorized by: Elsie Le APRN   Comparison: compared with previous ECG from 10/8/2022  Similar to previous ECG  Rhythm: atrial fibrillation  Ectopy: unifocal PVCs  Rate: normal  BPM: 92  T flattening: II, III and aVF    Clinical impression: abnormal EKG            Assessment and Plan        Assessment and Plan     Assessment:  1. Acute systolic congestive heart failure (HCC)    2. New onset atrial fibrillation (HCC)    3. Hypothyroidism, unspecified type    4. DILLON (acute kidney injury) (HCC)         1. Acute systolic heart failure: Hospitalized October 2022 with weakness and confusion; found to be in atrial fibrillation.  Imaging showed vascular engorgement.  Echocardiogram showed EF 30 to 35% with regional wall motion abnormalities.  Coronary angiography deferred due to his age and frailty.  He was discharged home on as needed Lasix; he took 2 doses after his weight went up to 212 pounds.  He initially lost 8 pounds but started to have weight gain again.  His weight is currently up since discharge.  2. New onset atrial fibrillation: Diagnosed with atrial fibrillation with rapid heart rate in October 2022; also noted to have reduced EF.  His [id number] score is 4 and he is anticoagulated with 2.5 mg apixaban twice daily given his weight and renal function.  His heart rate still little fast on 6.25 mg carvedilol twice daily.  3. DILLON: His creatinine was above 2 when admitted to the hospital but was 1.4 at discharge.  His son is with him today and reports that creatinine at recent PCP visit was up to 2.2 again.  4. Hypothyroidism: He is treated with levothyroxine.      Plan:  1. I recommend that he continue 40 mg Lasix daily for volume control.  2. I will start 200 mg amiodarone daily for better heart rate control.  3. I will see him back in 2 weeks; will likely update labs at that time.      Follow Up:  Return  in about 2 weeks (around 11/21/2022) for Follow-up with ALEJANDRO Parsons.  Orders Placed This Encounter   Procedures   • ECG 12 Lead      New Medications Ordered This Visit   Medications   • amiodarone (PACERONE) 200 MG tablet     Sig: Take 1 tablet by mouth Daily.     Dispense:  30 tablet     Refill:  5   • furosemide (Lasix) 40 MG tablet     Sig: Take 1 tablet by mouth Daily for 30 days.     Dispense:  30 tablet     Refill:  0         Thank you the opportunity to participate in this patient's care.    ALEJANDRO Eaton    This office note has been dictated.

## 2022-11-09 RX ORDER — FUROSEMIDE 40 MG/1
40 TABLET ORAL DAILY
Qty: 30 TABLET | Refills: 0 | Status: SHIPPED | OUTPATIENT
Start: 2022-11-09 | End: 2022-11-15

## 2022-11-11 RX ORDER — CARVEDILOL 6.25 MG/1
6.25 TABLET ORAL 2 TIMES DAILY WITH MEALS
Qty: 60 TABLET | Refills: 0 | Status: CANCELLED | OUTPATIENT
Start: 2022-11-11 | End: 2022-12-11

## 2022-11-14 RX ORDER — CARVEDILOL 6.25 MG/1
6.25 TABLET ORAL 2 TIMES DAILY WITH MEALS
Qty: 60 TABLET | Refills: 5 | Status: SHIPPED | OUTPATIENT
Start: 2022-11-14 | End: 2022-12-14

## 2022-11-14 NOTE — TELEPHONE ENCOUNTER
Patient son called and stated that the patient needs a refill on the Coreg 6.25 mg BID.    Last OV-11/07/22-MM    Plan:  1. I recommend that he continue 40 mg Lasix daily for volume control.  2. I will start 200 mg amiodarone daily for better heart rate control.  3. I will see him back in 2 weeks; will likely update labs at that time.        Follow Up:  Return in about 2 weeks (around 11/21/2022) for Follow-up with ALEJANDRO Parsons.

## 2022-11-14 NOTE — TELEPHONE ENCOUNTER
Last OV-11/07/22-MM    Plan:  1. I recommend that he continue 40 mg Lasix daily for volume control.  2. I will start 200 mg amiodarone daily for better heart rate control.  3. I will see him back in 2 weeks; will likely update labs at that time.        Follow Up:  Return in about 2 weeks (around 11/21/2022) for Follow-up with ALEJANDRO Parsons.

## 2022-11-15 RX ORDER — FUROSEMIDE 40 MG/1
TABLET ORAL
Qty: 90 TABLET | Refills: 0 | Status: SHIPPED | OUTPATIENT
Start: 2022-11-15

## 2022-12-05 ENCOUNTER — OFFICE VISIT (OUTPATIENT)
Dept: CARDIOLOGY | Facility: CLINIC | Age: 87
End: 2022-12-05

## 2022-12-05 VITALS
DIASTOLIC BLOOD PRESSURE: 74 MMHG | WEIGHT: 204 LBS | SYSTOLIC BLOOD PRESSURE: 128 MMHG | HEIGHT: 72 IN | BODY MASS INDEX: 27.63 KG/M2 | OXYGEN SATURATION: 98 % | HEART RATE: 80 BPM

## 2022-12-05 DIAGNOSIS — E03.9 HYPOTHYROIDISM, UNSPECIFIED TYPE: ICD-10-CM

## 2022-12-05 DIAGNOSIS — I48.91 NEW ONSET ATRIAL FIBRILLATION: ICD-10-CM

## 2022-12-05 DIAGNOSIS — I50.21 ACUTE SYSTOLIC CONGESTIVE HEART FAILURE: Primary | ICD-10-CM

## 2022-12-05 DIAGNOSIS — N17.9 AKI (ACUTE KIDNEY INJURY): ICD-10-CM

## 2022-12-05 PROCEDURE — 93000 ELECTROCARDIOGRAM COMPLETE: CPT | Performed by: NURSE PRACTITIONER

## 2022-12-05 PROCEDURE — 99214 OFFICE O/P EST MOD 30 MIN: CPT | Performed by: NURSE PRACTITIONER

## 2022-12-05 RX ORDER — VITAMIN A, ASCORBIC ACID, CHOLECALCIFEROL, ALPHA-TOCOPHEROL ACETATE, THIAMINE HYDROCHLORIDE, RIBOFLAVIN 5-PHOSPHATE SODIUM, NIACINAMIDE, PYRIDOXINE HYDROCHLORIDE, FERROUS SULFATE AND SODIUM FLUORIDE 1500; 35; 400; 5; .5; .6; 8; .4; 10; .25 [IU]/ML; MG/ML; [IU]/ML; [IU]/ML; MG/ML; MG/ML; MG/ML; MG/ML; MG/ML; MG/ML
LIQUID ORAL
COMMUNITY
End: 2023-02-07 | Stop reason: ALTCHOICE

## 2022-12-05 NOTE — PROGRESS NOTES
[facility] CARDIOLOGY  [address]  [facility] [address]  [facility] [address]  Phone: [phone]      Patient Name: Rafa Bautista  :1928  Age: 89 y.o.  Primary Cardiologist: Hannah Marr MD  Encounter Provider:  ALEJANDRO Smith      Chief Complaint     Chief Complaint: Follow-up      SUBJECTIVE     History of Present Illness:  Rafa Bautista is a 89 y.o.  male whose medical history includes hypertension and hyperlipidemia.  He was seen in consultation by Dr. Marr in 2022 for acute systolic heart failure.     22 Follow-up:  He is here for follow-up of medication changes.  His weight is down and his leg swelling is improved on 40 mg Lasix daily.  At last visit, I also started 200 mg amiodarone for better heart rate control.  Since last visit he has developed an itchy rash on his abdomen, back, and shoulders.  His heart rate at home is consistently in the 70s with blood pressure 130s/70s.  He denies chest pain, dyspnea with exertion, orthopnea, or lightheadedness.  He is working with physical therapy with the goal to continue to mow his lawn this summer.    Below is a summary of pertinent cardiology findings:  · In mid 2022 he presented to the ER with worsening weakness and confusion over the past 2 to 3 weeks; he was found to be in A. fib with heart rate 117.  Chest x-ray showed pneumonia versus bilateral pulmonary vascular engorgement.  His echocardiogram showed EF 30 to 35% with regional wall motion abnormalities.  Coronary angiography was deferred due to his age and frailty.  He was also noted to have DILLON.  Was discharged home on 2.5 mg apixaban twice daily, 6.25 mg carvedilol twice daily, 40 mg Lasix as needed.  • 2022 echocardiogram showed EF 31 to 35%, severe hypokinesis of the inferior and lateral walls, moderate apical hypokinesis, normal RV cavity size and systolic function, mildly dilated LV cavity, mild to  "moderately dilated left atrial cavity, mild mitral valve regurgitation, mild tricuspid valve regurgitation, RVSP 33 mmHg, ascending aorta mildly dilated at 3.8 cm, and no evidence of pericardial effusion.  • November 2022 his weight was up following discharge as he had not been taking Lasix daily and this was resumed at 40 mg daily.    Past Medical History:   Diagnosis Date   • Hyperlipidemia    • Hypertension        Past Surgical History:  •  has a past surgical history that includes Back surgery.     Social History     Socioeconomic History   • Marital status:    Tobacco Use   • Smoking status: Former   Substance and Sexual Activity   • Drug use: No   • Sexual activity: Defer         Review of Systems     Review of Systems   Constitutional: Positive for weight gain.   Cardiovascular: Positive for leg swelling. Negative for chest pain, claudication, cyanosis, dyspnea on exertion, irregular heartbeat, near-syncope, orthopnea, palpitations, paroxysmal nocturnal dyspnea and syncope.   Skin: Positive for rash.       Medications     Allergies as of 12/05/2022 - Reviewed 12/05/2022   Allergen Reaction Noted   • Penicillins  11/04/2017       Current Outpatient Medications on File Prior to Visit   Medication Sig   • apixaban (ELIQUIS) 2.5 MG tablet tablet Take 1 tablet by mouth Every 12 (Twelve) Hours. Indications: Atrial Fibrillation   • carvedilol (COREG) 6.25 MG tablet Take 1 tablet by mouth 2 (Two) Times a Day With Meals for 30 days.   • furosemide (LASIX) 40 MG tablet TAKE 1 TABLET BY MOUTH DAILY   • levothyroxine (SYNTHROID, LEVOTHROID) 50 MCG tablet Take 1 tablet by mouth Every Morning.   • Ped Multivitamins-Fl-Iron (Multi-Vitamin/Fluoride/Iron) 0.25-10 MG/ML solution Take  by mouth.   • terazosin (HYTRIN) 1 MG capsule Take 1 mg by mouth Every Night.     No current facility-administered medications on file prior to visit.          OBJECTIVE     Vital Signs:   /74   Pulse 80   Ht 182.9 cm (72\")   Wt " 92.5 kg (204 lb)   SpO2 98%   BMI 27.67 kg/m²       Weight:  Wt Readings from Last 3 Encounters:   12/05/22 92.5 kg (204 lb)   11/07/22 94.3 kg (208 lb)   10/15/22 90.6 kg (199 lb 12.8 oz)     Body mass index is 27.67 kg/m².      Physical Exam     Physical Exam  Constitutional:       General: He is not in acute distress.  HENT:      Head: Normocephalic and atraumatic.      Mouth/Throat:      Mouth: Mucous membranes are moist.   Eyes:      General: No scleral icterus.     Extraocular Movements: Extraocular movements intact.      Conjunctiva/sclera: Conjunctivae normal.      Pupils: Pupils are equal, round, and reactive to light.   Cardiovascular:      Rate and Rhythm: Normal rate. Rhythm irregular.      Pulses: Normal pulses.      Heart sounds: S1 normal and S2 normal.   Pulmonary:      Effort: No respiratory distress.      Breath sounds: Normal breath sounds. No wheezing, rhonchi or rales.   Abdominal:      General: Bowel sounds are normal. There is no distension.      Palpations: Abdomen is soft.      Tenderness: There is no abdominal tenderness.   Musculoskeletal:         General: Normal range of motion.      Cervical back: Normal range of motion and neck supple.      Right lower leg: Edema present.      Left lower leg: Edema present.   Skin:     General: Skin is warm and dry.      Coloration: Skin is not jaundiced.      Findings: Rash present.      Comments: Maculopapular rash to abdomen and back.  No drainage noted.   Neurological:      Mental Status: He is alert and oriented to person, place, and time.   Psychiatric:         Mood and Affect: Mood normal.         Reviewed Data     Result Review :  The following data was reviewed by ALEJANDRO Smith on 12/06/22:  • Labs 10/11/2022:  cr 2.2, K 3.5, AST 45, Tbil 1.8, otherwise unremarkable CMP, Mg 2.2, Chol 133, HDL 37, LDL 80, Trig 81, Hgb 13.6, Plt 175  • Labs 10/15/22:  cr 1.4, K 3.8, otherwise unremarkable BMP, hgb 12.3, plt 192  • 12/05/2022  No new labs to review.      ECG 12 Lead    Date/Time: 12/5/2022 2:22 PM  Performed by: Elsie Le APRN  Authorized by: Elsie Le APRN   Comparison: compared with previous ECG from 11/7/2022  Similar to previous ECG  Rhythm: atrial fibrillation  Ectopy: unifocal PVCs  Rate: normal  BPM: 74    Clinical impression: abnormal EKG            Assessment and Plan        Assessment and Plan     Assessment:  1. Acute systolic congestive heart failure (HCC)    2. New onset atrial fibrillation (HCC)    3. DILLON (acute kidney injury) (HCC)    4. Hypothyroidism, unspecified type         1. Acute systolic heart failure: Hospitalized October 2022 with weakness and confusion; found to be in atrial fibrillation.  Imaging showed vascular engorgement.  Echocardiogram showed EF 30 to 35% with regional wall motion abnormalities.  Coronary angiography deferred due to his age and frailty.  He was discharged home on as needed Lasix; he took 2 doses after his weight went up to 212 pounds.  His weight is stable on 40 mg Lasix daily.  He has less leg swelling and his breathing is comfortable.  2. New onset atrial fibrillation: Diagnosed with atrial fibrillation with rapid heart rate in October 2022; also noted to have reduced EF.  His [id number] score is 4 and he is anticoagulated with 2.5 mg apixaban twice daily given his weight and renal function.  He developed a rash after starting amiodarone.  His heart rate at home is currently in the 70s.  3. DILLON: His creatinine was above 2 when admitted to the hospital but was 1.4 at discharge.  His son is with him today and reports that creatinine at recent PCP visit was up to 2.2 again.  Labs to be drawn at upcoming renal appointment  4. Hypothyroidism: He is treated with levothyroxine.  No recent labs to review.    Plan:  Mr. Gallegos is a patient who was evaluated by Dr. James when admitted in October 2022 with worsening weakness and confusion.  He was found to  be in new onset atrial fibrillation and acute systolic heart failure at that time.  He also had DILLON.  At hospital follow-up he had not been taking Lasix, his weight was up and he was having more swelling.  His weight is down and his swelling is better on 40 mg Lasix daily.  I also had started 200 mg amiodarone at last visit for better heart rate control but he now has concerning rash.  I will stop amiodarone for now to see if rash improves.  I recommend they try over-the-counter hydrocortisone cream or Benadryl cream to help with the itching.  We will follow labs to be drawn at upcoming nephrology appointment.  I will see him back in 4 weeks.      Follow Up:  Return in about 4 weeks (around 1/2/2023) for Follow-up with ALEJANDRO Parsons.  Orders Placed This Encounter   Procedures   • ECG 12 Lead      No orders of the defined types were placed in this encounter.        Thank you the opportunity to participate in this patient's care.    ALEJANDRO Eaton    This office note has been dictated.

## 2023-01-25 ENCOUNTER — TELEPHONE (OUTPATIENT)
Dept: CARDIOLOGY | Facility: CLINIC | Age: 88
End: 2023-01-25

## 2023-01-25 NOTE — TELEPHONE ENCOUNTER
Caller: FIDEL    Relationship: SON  Best call back number: [phone]    What is the best time to reach you: ANY    What was the call regarding: PT HAS HAD SEVERAL APPTS WITH [facility] CANCELLED.  THEY ARE REQUESTING TO SEE DR. SIMPSON INSTEAD.  WAS UNABLE TO FIND AN APPT AT ANY LOCATION TILL April AND THEY DO NOT WANT TO WAIT THAT LONG.     WOULD PREFER [facility] BUT CAN DO [facility] IF NEEDED.  NOT AVAIL WEEK OF THE 29TH.    Do you require a callback: YES

## 2023-01-26 NOTE — TELEPHONE ENCOUNTER
I called and spoke with patient's son, Chase. Patient is scheduled for follow up with Dr. Marr 2/7; they are aware this is at the [facility] office.     Thank you,   Yodit

## 2023-02-03 NOTE — TELEPHONE ENCOUNTER
I spoke with Dilia, patient had a consult from Dr. Marr at [facility] 10/2022. Patient is okay to keep appointment.     Yodit

## 2023-02-03 NOTE — TELEPHONE ENCOUNTER
Pt is schedule on Tuesday with .     This should be schedule as a 40 min and not a 20 min spot.     Can you please call pt and reschedule?

## 2023-02-07 ENCOUNTER — OFFICE VISIT (OUTPATIENT)
Dept: CARDIOLOGY | Facility: CLINIC | Age: 88
End: 2023-02-07
Payer: MEDICARE

## 2023-02-07 VITALS
SYSTOLIC BLOOD PRESSURE: 140 MMHG | WEIGHT: 190 LBS | HEART RATE: 83 BPM | BODY MASS INDEX: 25.73 KG/M2 | DIASTOLIC BLOOD PRESSURE: 82 MMHG | HEIGHT: 72 IN

## 2023-02-07 DIAGNOSIS — I48.91 NEW ONSET ATRIAL FIBRILLATION: ICD-10-CM

## 2023-02-07 DIAGNOSIS — I50.21 ACUTE SYSTOLIC CONGESTIVE HEART FAILURE: Primary | ICD-10-CM

## 2023-02-07 PROCEDURE — 99214 OFFICE O/P EST MOD 30 MIN: CPT | Performed by: INTERNAL MEDICINE

## 2023-02-07 PROCEDURE — 93000 ELECTROCARDIOGRAM COMPLETE: CPT | Performed by: INTERNAL MEDICINE

## 2023-02-07 RX ORDER — DOXYCYCLINE HYCLATE 50 MG/1
324 CAPSULE, GELATIN COATED ORAL
COMMUNITY

## 2023-02-07 RX ORDER — FINASTERIDE 5 MG/1
5 TABLET, FILM COATED ORAL DAILY
COMMUNITY

## 2023-02-07 RX ORDER — SILODOSIN 4 MG/1
8 CAPSULE ORAL
COMMUNITY

## 2023-02-07 NOTE — PROGRESS NOTES
Date of Office Visit: 2023  Encounter Provider: Hannah Marr MD  Place of Service: [facility] CARDIOLOGY  Patient Name: Rafa Bautista  :1928      Patient ID:  Rafa Bautista is a 89 y.o. male is here for  followup for atrial fibrillation.        History of Present Illness    He has a history of hyperlipidemia and hypertension, atrial fibrillation, congestive heart failure.    He presented to the emergency department 10/2022 with weakness and confusion, found to be in atrial fibrillation with congestive heart failure.  His echocardiogram showed ejection fraction 30-35% with severe hypokinesis of the inferior lateral walls and moderate apical hypokinesis, mild to moderate dilated left atrium, mild mitral insufficiency and mild tricuspid insufficiency.  Coronary angiography was deferred due to age and frailty.  He also had acute kidney injury.  He was discharged home with carvedilol, Lasix and apixaban.     He has been seen several times since he was dismissed from the hospital in 2022.  Adjustments have been made to his medications including adjusting Lasix and the addition of amiodarone for rate control but he developed a rash with amiodarone and this was discontinued.    Labs on 2023 show hemoglobin 11, otherwise unremarkable CMP, normal PTH, creatinine 1.78, BUN 41, potassium 4.1, ferritin 151, normal iron panel.    He went off Lasix 1 month ago and his breathing is stable and he has no increase in lower extremity edema.  He is using a walker here but at home he normally just uses a cane.  He plans to try to mow his grass this summer and has been very much more active lately than he was in the fall when he was in heart failure.  He has no chest pain or pressure.  He has no difficulty breathing.  He has no orthopnea or PND.  He does not feel his heart racing or skipping.  At home his blood pressure runs 130/70 with a heart rate that is usually under  "80.  He has had no dizziness, syncope, falls.  He has a good appetite.  He is taking medications as directed without difficulty.    Past Medical History:   Diagnosis Date   • Hyperlipidemia    • Hypertension          Past Surgical History:   Procedure Laterality Date   • BACK SURGERY         Current Outpatient Medications on File Prior to Visit   Medication Sig Dispense Refill   • apixaban (ELIQUIS) 2.5 MG tablet tablet Take 1 tablet by mouth Every 12 (Twelve) Hours. Indications: Atrial Fibrillation 180 tablet 1   • ferrous gluconate (FERGON) 324 MG tablet Take 324 mg by mouth Daily With Breakfast.     • finasteride (PROSCAR) 5 MG tablet Take 5 mg by mouth Daily.     • furosemide (LASIX) 40 MG tablet TAKE 1 TABLET BY MOUTH DAILY 90 tablet 0   • levothyroxine (SYNTHROID, LEVOTHROID) 50 MCG tablet Take 1 tablet by mouth Every Morning.     • silodosin (RAPAFLO) 4 MG capsule capsule Take 8 mg by mouth Daily With Breakfast.     • terazosin (HYTRIN) 1 MG capsule Take 1 mg by mouth Every Night.     • carvedilol (COREG) 6.25 MG tablet Take 1 tablet by mouth 2 (Two) Times a Day With Meals for 30 days. 60 tablet 5   • [DISCONTINUED] Ped Multivitamins-Fl-Iron (Multi-Vitamin/Fluoride/Iron) 0.25-10 MG/ML solution Take  by mouth.       No current facility-administered medications on file prior to visit.       Social History     Socioeconomic History   • Marital status:    Tobacco Use   • Smoking status: Former   Substance and Sexual Activity   • Drug use: No   • Sexual activity: Defer           ROS    Procedures    ECG 12 Lead    Date/Time: 2/7/2023 10:09 AM  Performed by: Hannah Marr MD  Authorized by: Hannah Marr MD   Comparison: compared with previous ECG   Similar to previous ECG  Rhythm: atrial fibrillation  Q waves: V2 and V3      Clinical impression: abnormal EKG                Objective:      Vitals:    02/07/23 0946   BP: 140/82   Pulse: 83   Weight: 86.2 kg (190 lb)   Height: 182.9 cm (72\") "     Body mass index is 25.77 kg/m².    Vitals reviewed.   Constitutional:       General: Not in acute distress.     Appearance: Well-developed. Not diaphoretic.   Eyes:      General: No scleral icterus.     Conjunctiva/sclera: Conjunctivae normal.   HENT:      Head: Normocephalic and atraumatic.   Neck:      Thyroid: No thyromegaly.      Vascular: No carotid bruit or JVD.      Lymphadenopathy: No cervical adenopathy.   Pulmonary:      Effort: Pulmonary effort is normal. No respiratory distress.      Breath sounds: Normal breath sounds. No wheezing. No rhonchi. No rales.   Chest:      Chest wall: Not tender to palpatation.   Cardiovascular:      Normal rate. Irregularly irregular rhythm.      Murmurs: There is no murmur.      No gallop.   Pulses:     Intact distal pulses.   Edema:     Peripheral edema absent.   Abdominal:      General: Bowel sounds are normal. There is no distension or abdominal bruit.      Palpations: Abdomen is soft. There is no abdominal mass.      Tenderness: There is no abdominal tenderness.   Musculoskeletal:         General: No deformity.      Extremities: No clubbing present.     Cervical back: Neck supple. Skin:     General: Skin is warm and dry. There is no cyanosis.      Coloration: Skin is not pale.      Findings: No rash.   Neurological:      Mental Status: Alert and oriented to person, place, and time.      Cranial Nerves: No cranial nerve deficit.   Psychiatric:         Judgment: Judgment normal.         Lab Review:       Assessment:      Diagnosis Plan   1. Acute systolic congestive heart failure (HCC)        2. New onset atrial fibrillation (HCC)          1. Cardiomyopathy, likely ischemic with history of acute systolic heart failure in October 2022.  His presentation was weakness and confusion.  Given his age and frailty, no ischemic evaluation is planned.  He has no decompensated heart failure at this time and is off of Lasix.  Would remain off of Lasix as long as he is  euvolemic.  2. Permanent atrial fibrillation: Diagnosed with atrial fibrillation with rapid heart rate in October 2022; also noted to have reduced EF.  His [id number] score is 4 and he is anticoagulated with 2.5 mg apixaban twice daily given his weight and renal function.  He developed a rash after starting amiodarone-this was discontinued.  His heart rate at home <80.   3. CKD with history of DILLON.  4. Hypothyroidism: He is treated with levothyroxine.       Plan:       See Dhara in 4 months at the main office, remain on apixaban and carvedilol.  No other testing at this time.

## 2023-03-29 ENCOUNTER — TRANSCRIBE ORDERS (OUTPATIENT)
Dept: ADMINISTRATIVE | Facility: HOSPITAL | Age: 88
End: 2023-03-29
Payer: MEDICARE

## 2023-03-29 DIAGNOSIS — N18.32 STAGE 3B CHRONIC KIDNEY DISEASE: Primary | ICD-10-CM

## 2023-04-17 ENCOUNTER — HOSPITAL ENCOUNTER (OUTPATIENT)
Dept: ULTRASOUND IMAGING | Facility: HOSPITAL | Age: 88
Discharge: HOME | End: 2023-04-17
Payer: MEDICARE

## 2023-04-17 DIAGNOSIS — N18.32 STAGE 3B CHRONIC KIDNEY DISEASE: ICD-10-CM

## 2023-04-17 PROCEDURE — 76775 US EXAM ABDO BACK WALL LIM: CPT

## 2023-06-07 ENCOUNTER — OFFICE VISIT (OUTPATIENT)
Dept: CARDIOLOGY | Facility: CLINIC | Age: 88
End: 2023-06-07
Payer: MEDICARE

## 2023-06-07 VITALS
WEIGHT: 182 LBS | HEIGHT: 72 IN | SYSTOLIC BLOOD PRESSURE: 128 MMHG | HEART RATE: 87 BPM | BODY MASS INDEX: 24.65 KG/M2 | DIASTOLIC BLOOD PRESSURE: 74 MMHG

## 2023-06-07 DIAGNOSIS — N18.31 STAGE 3A CHRONIC KIDNEY DISEASE: ICD-10-CM

## 2023-06-07 DIAGNOSIS — I48.19 PERSISTENT ATRIAL FIBRILLATION: ICD-10-CM

## 2023-06-07 DIAGNOSIS — I50.22 CHRONIC SYSTOLIC CONGESTIVE HEART FAILURE: Primary | ICD-10-CM

## 2023-06-07 RX ORDER — CARVEDILOL 12.5 MG/1
12.5 TABLET ORAL 2 TIMES DAILY WITH MEALS
Qty: 180 TABLET | Refills: 3 | Status: SHIPPED | OUTPATIENT
Start: 2023-06-07

## 2023-06-07 NOTE — PROGRESS NOTES
[facility] CARDIOLOGY  [address]  [facility] [address]  [facility] [address]  Phone: [phone]  Fax: [phone]      Patient Name: Rafa Bautista  :1928  Age: 89 y.o.  Primary Cardiologist: Hannah Marr MD  Encounter Provider:  ALEJANDRO Smith      Chief Complaint     Chief Complaint: Follow-up      SUBJECTIVE     History of Present Illness:  Rafa Bautista is a 89 y.o.  male whose medical history includes hypertension and hyperlipidemia.  He was seen in consultation by Dr. Marr in 2022 for acute systolic heart failure.     23 Follow-up:  He is here for 3-month follow-up.  He is doing great and has gotten back to mow his lawn; he is staying very active and feels good.  His weight is down 8 pounds since last visit and 26 pounds since hospitalization; his son is with him and feels this is just loss of fluid.  He continues to eat 3 meals a day but does not eat in excess.  His weight has been stable at this weight for the past 6 weeks.  He also feels the weight loss has improved his mobility.  Blood pressure at home can run 110s to 120s but he denies any lightheadedness.  He does not feel any issues with the A-fib.  He denies chest pain, dyspnea with exertion, syncope, or leg swelling.  He is taking his medications as prescribed.    Below is a summary of pertinent cardiology findings:  In mid 2022 he presented to the ER with worsening weakness and confusion over the past 2 to 3 weeks; he was found to be in A. fib with heart rate 117.  Chest x-ray showed pneumonia versus bilateral pulmonary vascular engorgement.  His echocardiogram showed EF 30 to 35% with regional wall motion abnormalities.  Coronary angiography was deferred due to his age and frailty.  He was also noted to have DILLON.  Was discharged home on 2.5 mg apixaban twice daily, 6.25 mg carvedilol twice daily, 40 mg Lasix as needed.  2022 echocardiogram showed EF 31  to 35%, severe hypokinesis of the inferior and lateral walls, moderate apical hypokinesis, normal RV cavity size and systolic function, mildly dilated LV cavity, mild to moderately dilated left atrial cavity, mild mitral valve regurgitation, mild tricuspid valve regurgitation, RVSP 33 mmHg, ascending aorta mildly dilated at 3.8 cm, and no evidence of pericardial effusion.  November 2022 his weight was up following discharge as he had not been taking Lasix daily and this was resumed at 40 mg daily.  At hospital follow-up he was having elevated heart rate and started on amiodarone; it had to be stopped due to rash.    Past Medical History:   Diagnosis Date    Hyperlipidemia     Hypertension        Past Surgical History:   has a past surgical history that includes Back surgery.     Social History     Socioeconomic History    Marital status:    Tobacco Use    Smoking status: Former   Substance and Sexual Activity    Drug use: No    Sexual activity: Defer         Review of Systems     Review of Systems   Constitutional: Negative for weight loss.   Cardiovascular:  Negative for chest pain, claudication, cyanosis, dyspnea on exertion, irregular heartbeat, leg swelling, near-syncope, orthopnea, palpitations, paroxysmal nocturnal dyspnea and syncope.   Skin:  Negative for rash.     Medications     Allergies as of 06/07/2023 - Reviewed 06/07/2023   Allergen Reaction Noted    Doxazosin Other (See Comments) 07/01/2014    Penicillins  11/04/2017    Tamsulosin Other (See Comments) 07/01/2014       Current Outpatient Medications on File Prior to Visit   Medication Sig    apixaban (ELIQUIS) 2.5 MG tablet tablet Take 1 tablet by mouth Every 12 (Twelve) Hours. Indications: Atrial Fibrillation    ferrous gluconate (FERGON) 324 MG tablet Take 1 tablet by mouth Daily With Breakfast.    finasteride (PROSCAR) 5 MG tablet Take 1 tablet by mouth Daily.    levothyroxine (SYNTHROID, LEVOTHROID) 50 MCG tablet Take 1 tablet by mouth  "Every Morning.    silodosin (RAPAFLO) 4 MG capsule capsule Take 2 capsules by mouth Daily With Breakfast.    terazosin (HYTRIN) 1 MG capsule Take 1 capsule by mouth Every Night.    [DISCONTINUED] carvedilol (COREG) 6.25 MG tablet Take 1 tablet by mouth 2 (Two) Times a Day With Meals for 30 days.    [DISCONTINUED] furosemide (LASIX) 40 MG tablet TAKE 1 TABLET BY MOUTH DAILY (Patient not taking: Reported on 6/7/2023)     No current facility-administered medications on file prior to visit.          OBJECTIVE     Vital Signs:   /74   Pulse 87   Ht 182.9 cm (72\")   Wt 82.6 kg (182 lb)   BMI 24.68 kg/m²       Weight:  Wt Readings from Last 3 Encounters:   06/07/23 82.6 kg (182 lb)   02/07/23 86.2 kg (190 lb)   12/05/22 92.5 kg (204 lb)     Body mass index is 24.68 kg/m².      Physical Exam     Physical Exam  Constitutional:       General: He is not in acute distress.  HENT:      Head: Normocephalic and atraumatic.      Mouth/Throat:      Mouth: Mucous membranes are moist.   Eyes:      General: No scleral icterus.     Extraocular Movements: Extraocular movements intact.      Conjunctiva/sclera: Conjunctivae normal.      Pupils: Pupils are equal, round, and reactive to light.   Cardiovascular:      Rate and Rhythm: Normal rate. Rhythm irregular.      Pulses: Normal pulses.      Heart sounds: S1 normal and S2 normal.   Pulmonary:      Effort: No respiratory distress.      Breath sounds: Normal breath sounds. No wheezing, rhonchi or rales.   Abdominal:      General: Bowel sounds are normal. There is no distension.      Palpations: Abdomen is soft.      Tenderness: There is no abdominal tenderness.   Musculoskeletal:         General: Normal range of motion.      Cervical back: Normal range of motion and neck supple.      Right lower leg: No edema.      Left lower leg: No edema.   Skin:     General: Skin is warm and dry.      Coloration: Skin is not jaundiced.      Findings: No rash.      Comments: Maculopapular rash " to abdomen and back.  No drainage noted.   Neurological:      Mental Status: He is alert and oriented to person, place, and time.   Psychiatric:         Mood and Affect: Mood normal.       Reviewed Data     Result Review :  The following data was reviewed by ALEJANDRO Smith on 06/07/23:  Labs 10/11/2022:  cr 2.2, K 3.5, AST 45, Tbil 1.8, otherwise unremarkable CMP, Mg 2.2, Chol 133, HDL 37, LDL 80, Trig 81, Hgb 13.6, Plt 175  Labs 10/15/22:  cr 1.4, K 3.8, otherwise unremarkable BMP, hgb 12.3, plt 192  04/20/2023: cr 1.9, K 4.7, otherwise unremarkable renal panel, Hgb 12.0,       ECG 12 Lead    Date/Time: 6/7/2023 12:45 PM  Performed by: Elsie Le APRN  Authorized by: Elsie Le APRN   Comparison: compared with previous ECG from 2/7/2023  Similar to previous ECG  Rhythm: atrial fibrillation  Q waves: III, aVF, V2, V3 and V4    Other findings: low voltage    Clinical impression: abnormal EKG        Assessment and Plan        Assessment and Plan     Assessment:  1. Chronic systolic congestive heart failure    2. Persistent atrial fibrillation    3. Stage 3a chronic kidney disease         Acute systolic heart failure: Hospitalized October 2022 with weakness and confusion; found to be in atrial fibrillation.  Imaging showed vascular engorgement.  Echocardiogram showed EF 30 to 35% with regional wall motion abnormalities.  Coronary angiography deferred due to his age and frailty.  He looks great and is compensated off lasix. He remains on carvedilol.   Persistent atrial fibrillation: Diagnosed with atrial fibrillation with rapid heart rate in October 2022; also noted to have reduced EF.  His [id number] score is 4 and he is anticoagulated with 2.5 mg apixaban twice daily given his weight and renal function.  He developed a rash after starting amiodarone and this was stopped. His HR is controlled on 12.5 mg carvedilol BID.   DILLON: His creatinine was above 2 when  admitted to the hospital but was 1.4 at discharge. Renal function stable when checked in April 2023.  Hypothyroidism: He is treated with levothyroxine.  No recent labs to review.    Plan:  Mr. Gallegos is a patient who was evaluated by Dr. Marr when admitted in October 2022 with worsening weakness and confusion.  He was found to be in new onset atrial fibrillation and acute systolic heart failure at that time.  He also had DILLON; likely CKD. He is doing great. I will make no medication changes today; we did discuss that if BP becomes low or he has lightheadedness to notify the office. For now, I will have him see Dr. Marr in 6 months. Will consider repeating echocardiogram at that time.      Follow Up:  Return in about 6 months (around 12/7/2023) for Follow-up with Dr. Marr.  Orders Placed This Encounter   Procedures    ECG 12 Lead      New Medications Ordered This Visit   Medications    carvedilol (COREG) 12.5 MG tablet     Sig: Take 1 tablet by mouth 2 (Two) Times a Day With Meals.     Dispense:  180 tablet     Refill:  3         Thank you the opportunity to participate in this patient's care.    ALEJANDRO Eaton    This office note has been dictated.

## 2023-10-23 ENCOUNTER — HOSPITAL ENCOUNTER (INPATIENT)
Facility: HOSPITAL | Age: 88
LOS: 2 days | Discharge: HOME | End: 2023-10-25
Attending: STUDENT IN AN ORGANIZED HEALTH CARE EDUCATION/TRAINING PROGRAM | Admitting: INTERNAL MEDICINE
Payer: MEDICARE

## 2023-10-23 ENCOUNTER — APPOINTMENT (OUTPATIENT)
Dept: CT IMAGING | Facility: HOSPITAL | Age: 88
End: 2023-10-23
Payer: MEDICARE

## 2023-10-23 DIAGNOSIS — N17.9 AKI (ACUTE KIDNEY INJURY): Primary | ICD-10-CM

## 2023-10-23 LAB
ALBUMIN SERPL-MCNC: 3.9 G/DL (ref 3.5–5.2)
ALBUMIN/GLOB SERPL: 1 G/DL
ALP SERPL-CCNC: 94 U/L (ref 39–117)
ALT SERPL W P-5'-P-CCNC: 7 U/L (ref 1–41)
ANION GAP SERPL CALCULATED.3IONS-SCNC: 16.9 MMOL/L (ref 5–15)
AST SERPL-CCNC: 8 U/L (ref 1–40)
BACTERIA UR QL AUTO: ABNORMAL /HPF
BASOPHILS # BLD AUTO: 0.06 10*3/MM3 (ref 0–0.2)
BASOPHILS NFR BLD AUTO: 0.5 % (ref 0–1.5)
BILIRUB SERPL-MCNC: 0.5 MG/DL (ref 0–1.2)
BILIRUB UR QL STRIP: NEGATIVE
BUN SERPL-MCNC: 79 MG/DL (ref 8–23)
BUN/CREAT SERPL: 16.7 (ref 7–25)
CALCIUM SPEC-SCNC: 8.7 MG/DL (ref 8.2–9.6)
CHLORIDE SERPL-SCNC: 108 MMOL/L (ref 98–107)
CLARITY UR: ABNORMAL
CO2 SERPL-SCNC: 16.1 MMOL/L (ref 22–29)
COLOR UR: YELLOW
CREAT SERPL-MCNC: 4.73 MG/DL (ref 0.76–1.27)
D-LACTATE SERPL-SCNC: 1.2 MMOL/L (ref 0.5–2)
DEPRECATED RDW RBC AUTO: 46.5 FL (ref 37–54)
EGFRCR SERPLBLD CKD-EPI 2021: 10.8 ML/MIN/1.73
EOSINOPHIL # BLD AUTO: 0.23 10*3/MM3 (ref 0–0.4)
EOSINOPHIL NFR BLD AUTO: 2 % (ref 0.3–6.2)
ERYTHROCYTE [DISTWIDTH] IN BLOOD BY AUTOMATED COUNT: 14.6 % (ref 12.3–15.4)
GLOBULIN UR ELPH-MCNC: 3.8 GM/DL
GLUCOSE SERPL-MCNC: 127 MG/DL (ref 65–99)
GLUCOSE UR STRIP-MCNC: NEGATIVE MG/DL
HCT VFR BLD AUTO: 34 % (ref 37.5–51)
HGB BLD-MCNC: 11.1 G/DL (ref 13–17.7)
HGB UR QL STRIP.AUTO: ABNORMAL
HYALINE CASTS UR QL AUTO: ABNORMAL /LPF
IMM GRANULOCYTES # BLD AUTO: 0.08 10*3/MM3 (ref 0–0.05)
IMM GRANULOCYTES NFR BLD AUTO: 0.7 % (ref 0–0.5)
KETONES UR QL STRIP: NEGATIVE
LEUKOCYTE ESTERASE UR QL STRIP.AUTO: ABNORMAL
LYMPHOCYTES # BLD AUTO: 0.92 10*3/MM3 (ref 0.7–3.1)
LYMPHOCYTES NFR BLD AUTO: 8.1 % (ref 19.6–45.3)
MCH RBC QN AUTO: 28.3 PG (ref 26.6–33)
MCHC RBC AUTO-ENTMCNC: 32.6 G/DL (ref 31.5–35.7)
MCV RBC AUTO: 86.7 FL (ref 79–97)
MONOCYTES # BLD AUTO: 1.05 10*3/MM3 (ref 0.1–0.9)
MONOCYTES NFR BLD AUTO: 9.3 % (ref 5–12)
NEUTROPHILS NFR BLD AUTO: 79.4 % (ref 42.7–76)
NEUTROPHILS NFR BLD AUTO: 8.96 10*3/MM3 (ref 1.7–7)
NITRITE UR QL STRIP: NEGATIVE
NRBC BLD AUTO-RTO: 0 /100 WBC (ref 0–0.2)
PH UR STRIP.AUTO: 5.5 [PH] (ref 5–8)
PLATELET # BLD AUTO: 306 10*3/MM3 (ref 140–450)
PMV BLD AUTO: 9.4 FL (ref 6–12)
POTASSIUM SERPL-SCNC: 4.5 MMOL/L (ref 3.5–5.2)
PROT SERPL-MCNC: 7.7 G/DL (ref 6–8.5)
PROT UR QL STRIP: ABNORMAL
RBC # BLD AUTO: 3.92 10*6/MM3 (ref 4.14–5.8)
RBC # UR STRIP: ABNORMAL /HPF
REF LAB TEST METHOD: ABNORMAL
SODIUM SERPL-SCNC: 141 MMOL/L (ref 136–145)
SP GR UR STRIP: 1.01 (ref 1–1.03)
SQUAMOUS #/AREA URNS HPF: ABNORMAL /HPF
UROBILINOGEN UR QL STRIP: ABNORMAL
WBC # UR STRIP: ABNORMAL /HPF
WBC NRBC COR # BLD: 11.3 10*3/MM3 (ref 3.4–10.8)

## 2023-10-23 PROCEDURE — 85025 COMPLETE CBC W/AUTO DIFF WBC: CPT | Performed by: STUDENT IN AN ORGANIZED HEALTH CARE EDUCATION/TRAINING PROGRAM

## 2023-10-23 PROCEDURE — 83605 ASSAY OF LACTIC ACID: CPT | Performed by: INTERNAL MEDICINE

## 2023-10-23 PROCEDURE — 25810000003 SODIUM CHLORIDE 0.9 % SOLUTION: Performed by: INTERNAL MEDICINE

## 2023-10-23 PROCEDURE — 87077 CULTURE AEROBIC IDENTIFY: CPT | Performed by: STUDENT IN AN ORGANIZED HEALTH CARE EDUCATION/TRAINING PROGRAM

## 2023-10-23 PROCEDURE — 25810000003 SODIUM CHLORIDE 0.9 % SOLUTION 1,000 ML FLEX CONT: Performed by: INTERNAL MEDICINE

## 2023-10-23 PROCEDURE — 74176 CT ABD & PELVIS W/O CONTRAST: CPT

## 2023-10-23 PROCEDURE — 36415 COLL VENOUS BLD VENIPUNCTURE: CPT

## 2023-10-23 PROCEDURE — 87086 URINE CULTURE/COLONY COUNT: CPT | Performed by: STUDENT IN AN ORGANIZED HEALTH CARE EDUCATION/TRAINING PROGRAM

## 2023-10-23 PROCEDURE — 25810000003 SODIUM CHLORIDE 0.9 % SOLUTION: Performed by: STUDENT IN AN ORGANIZED HEALTH CARE EDUCATION/TRAINING PROGRAM

## 2023-10-23 PROCEDURE — 87040 BLOOD CULTURE FOR BACTERIA: CPT | Performed by: INTERNAL MEDICINE

## 2023-10-23 PROCEDURE — 99285 EMERGENCY DEPT VISIT HI MDM: CPT

## 2023-10-23 PROCEDURE — 80053 COMPREHEN METABOLIC PANEL: CPT | Performed by: STUDENT IN AN ORGANIZED HEALTH CARE EDUCATION/TRAINING PROGRAM

## 2023-10-23 PROCEDURE — 25010000002 CEFTRIAXONE PER 250 MG: Performed by: INTERNAL MEDICINE

## 2023-10-23 PROCEDURE — 81001 URINALYSIS AUTO W/SCOPE: CPT | Performed by: STUDENT IN AN ORGANIZED HEALTH CARE EDUCATION/TRAINING PROGRAM

## 2023-10-23 RX ORDER — AMOXICILLIN 250 MG
2 CAPSULE ORAL 2 TIMES DAILY
Status: DISCONTINUED | OUTPATIENT
Start: 2023-10-23 | End: 2023-10-25 | Stop reason: HOSPADM

## 2023-10-23 RX ORDER — SODIUM CHLORIDE 9 MG/ML
75 INJECTION, SOLUTION INTRAVENOUS CONTINUOUS
Status: DISCONTINUED | OUTPATIENT
Start: 2023-10-23 | End: 2023-10-24

## 2023-10-23 RX ORDER — ONDANSETRON 4 MG/1
4 TABLET, FILM COATED ORAL EVERY 6 HOURS PRN
Status: DISCONTINUED | OUTPATIENT
Start: 2023-10-23 | End: 2023-10-25 | Stop reason: HOSPADM

## 2023-10-23 RX ORDER — BISACODYL 5 MG/1
5 TABLET, DELAYED RELEASE ORAL DAILY PRN
Status: DISCONTINUED | OUTPATIENT
Start: 2023-10-23 | End: 2023-10-25 | Stop reason: HOSPADM

## 2023-10-23 RX ORDER — LEVOTHYROXINE SODIUM 0.05 MG/1
50 TABLET ORAL
Status: DISCONTINUED | OUTPATIENT
Start: 2023-10-24 | End: 2023-10-25 | Stop reason: HOSPADM

## 2023-10-23 RX ORDER — FINASTERIDE 5 MG/1
5 TABLET, FILM COATED ORAL DAILY
Status: DISCONTINUED | OUTPATIENT
Start: 2023-10-24 | End: 2023-10-25 | Stop reason: HOSPADM

## 2023-10-23 RX ORDER — ACETAMINOPHEN 325 MG/1
650 TABLET ORAL EVERY 4 HOURS PRN
Status: DISCONTINUED | OUTPATIENT
Start: 2023-10-23 | End: 2023-10-25 | Stop reason: HOSPADM

## 2023-10-23 RX ORDER — POLYETHYLENE GLYCOL 3350 17 G/17G
17 POWDER, FOR SOLUTION ORAL DAILY PRN
Status: DISCONTINUED | OUTPATIENT
Start: 2023-10-23 | End: 2023-10-25 | Stop reason: HOSPADM

## 2023-10-23 RX ORDER — BISACODYL 10 MG
10 SUPPOSITORY, RECTAL RECTAL DAILY PRN
Status: DISCONTINUED | OUTPATIENT
Start: 2023-10-23 | End: 2023-10-25 | Stop reason: HOSPADM

## 2023-10-23 RX ORDER — ONDANSETRON 2 MG/ML
4 INJECTION INTRAMUSCULAR; INTRAVENOUS EVERY 6 HOURS PRN
Status: DISCONTINUED | OUTPATIENT
Start: 2023-10-23 | End: 2023-10-25 | Stop reason: HOSPADM

## 2023-10-23 RX ORDER — CARVEDILOL 12.5 MG/1
12.5 TABLET ORAL 2 TIMES DAILY WITH MEALS
Status: DISCONTINUED | OUTPATIENT
Start: 2023-10-23 | End: 2023-10-24

## 2023-10-23 RX ORDER — UREA 10 %
3 LOTION (ML) TOPICAL NIGHTLY PRN
Status: DISCONTINUED | OUTPATIENT
Start: 2023-10-23 | End: 2023-10-25 | Stop reason: HOSPADM

## 2023-10-23 RX ORDER — TERAZOSIN 1 MG/1
1 CAPSULE ORAL NIGHTLY
Status: DISCONTINUED | OUTPATIENT
Start: 2023-10-23 | End: 2023-10-24

## 2023-10-23 RX ORDER — TAMSULOSIN HYDROCHLORIDE 0.4 MG/1
0.4 CAPSULE ORAL DAILY
Status: DISCONTINUED | OUTPATIENT
Start: 2023-10-24 | End: 2023-10-24

## 2023-10-23 RX ADMIN — SODIUM CHLORIDE 75 ML/HR: 9 INJECTION, SOLUTION INTRAVENOUS at 21:27

## 2023-10-23 RX ADMIN — ACETAMINOPHEN 650 MG: 325 TABLET, FILM COATED ORAL at 22:02

## 2023-10-23 RX ADMIN — SODIUM CHLORIDE 500 ML: 9 INJECTION, SOLUTION INTRAVENOUS at 17:59

## 2023-10-23 RX ADMIN — CARVEDILOL 12.5 MG: 12.5 TABLET, FILM COATED ORAL at 22:02

## 2023-10-23 RX ADMIN — APIXABAN 2.5 MG: 2.5 TABLET, FILM COATED ORAL at 22:02

## 2023-10-23 RX ADMIN — CEFTRIAXONE 2000 MG: 2 INJECTION, POWDER, FOR SOLUTION INTRAMUSCULAR; INTRAVENOUS at 21:26

## 2023-10-23 NOTE — ED NOTES
"Nursing report ED to floor  Rafa Bautista  89 y.o.  male    HPI (triage note):   Chief Complaint   Patient presents with    Abnormal Lab       Admitting doctor:   Theresa Padilla MD    Admitting diagnosis:   The encounter diagnosis was DILLON (acute kidney injury).    Code status:   Current Code Status       Date Active Code Status Order ID Comments User Context       10/23/2023 1759 No CPR (Do Not Attempt to Resuscitate) [id number]  Theresa Padilla MD ED        Question Answer    Code Status (Patient has no pulse and is not breathing) No CPR (Do Not Attempt to Resuscitate)    Medical Interventions (Patient has pulse or is breathing) Limited Support    Medical Intervention Limits: NO intubation (DNI)                    Allergies:   Doxazosin, Penicillins, and Tamsulosin    Past Medical History:  Past Medical History:   Diagnosis Date    Atrial fibrillation     BPH (benign prostatic hyperplasia)     CHF (congestive heart failure)     Hyperlipidemia     Hypertension         Weight:       10/23/23  1620   Weight: 74.4 kg (164 lb)       Most recent vitals:   Vitals:    10/23/23 1611 10/23/23 1620 10/23/23 1701 10/23/23 1801   BP: 122/70  112/69 117/66   BP Location: Left arm      Patient Position: Sitting      Pulse: 93  75 68   Resp:       Temp:       TempSrc:       SpO2:   100% 99%   Weight:  74.4 kg (164 lb)     Height:  182.9 cm (72\")         Active LDAs/IV Access:   Lines, Drains & Airways       Active LDAs       Name Placement date Placement time Site Days    Peripheral IV 10/23/23 1759 Anterior;Left Forearm 10/23/23  1759  Forearm  less than 1                    Labs (abnormal labs have a star):   Labs Reviewed   COMPREHENSIVE METABOLIC PANEL - Abnormal; Notable for the following components:       Result Value    Glucose 127 (*)     BUN 79 (*)     Creatinine 4.73 (*)     Chloride 108 (*)     CO2 16.1 (*)     Anion Gap 16.9 (*)     eGFR 10.8 (*)     All other components within normal limits    Narrative: "     GFR Normal >60  Chronic Kidney Disease <60  Kidney Failure <15    The GFR formula is only valid for adults with stable renal function between ages 18 and 70.   URINALYSIS W/ MICROSCOPIC IF INDICATED (NO CULTURE) - Abnormal; Notable for the following components:    Appearance, UA Turbid (*)     Blood, UA Large (3+) (*)     Protein,  mg/dL (2+) (*)     Leuk Esterase, UA Large (3+) (*)     All other components within normal limits   CBC WITH AUTO DIFFERENTIAL - Abnormal; Notable for the following components:    WBC 11.30 (*)     RBC 3.92 (*)     Hemoglobin 11.1 (*)     Hematocrit 34.0 (*)     Neutrophil % 79.4 (*)     Lymphocyte % 8.1 (*)     Immature Grans % 0.7 (*)     Neutrophils, Absolute 8.96 (*)     Monocytes, Absolute 1.05 (*)     Immature Grans, Absolute 0.08 (*)     All other components within normal limits   URINALYSIS, MICROSCOPIC ONLY - Abnormal; Notable for the following components:    RBC, UA Unable to determine due to loaded field (*)     WBC, UA Too Numerous to Count (*)     Bacteria, UA Unable to determine due to loaded field (*)     Squamous Epithelial Cells, UA Unable to determine due to loaded field (*)     All other components within normal limits   CBC AND DIFFERENTIAL    Narrative:     The following orders were created for panel order CBC & Differential.  Procedure                               Abnormality         Status                     ---------                               -----------         ------                     CBC Auto Differential[[id number]]        Abnormal            Final result                 Please view results for these tests on the individual orders.       EKG:   No orders to display       Meds given in ED:   Medications   sodium chloride 0.9 % bolus 500 mL (500 mL Intravenous New Bag 10/23/23 9635)   acetaminophen (TYLENOL) tablet 650 mg (has no administration in time range)   sennosides-docusate (PERICOLACE) 8.6-50 MG per tablet 2 tablet (has no administration  in time range)     And   polyethylene glycol (MIRALAX) packet 17 g (has no administration in time range)     And   bisacodyl (DULCOLAX) EC tablet 5 mg (has no administration in time range)     And   bisacodyl (DULCOLAX) suppository 10 mg (has no administration in time range)   ondansetron (ZOFRAN) tablet 4 mg (has no administration in time range)     Or   ondansetron (ZOFRAN) injection 4 mg (has no administration in time range)   melatonin tablet 3 mg (has no administration in time range)       Imaging results:  No radiology results for the last day    Ambulatory status:   - Stand by assist    Social issues:   Social History     Socioeconomic History    Marital status:    Tobacco Use    Smoking status: Former   Vaping Use    Vaping Use: Never used   Substance and Sexual Activity    Alcohol use: Not Currently     Comment: rarely    Drug use: No    Sexual activity: Defer          NIH Stroke Scale:         Ellis Haque RN  10/23/23 18:10 EDT    Nurse Direct line for any questions: [phone]

## 2023-10-23 NOTE — ED PROVIDER NOTES
EMERGENCY DEPARTMENT ENCOUNTER    Room Number:  18/18  PCP: Beverly Lino MD  Historian: Patient, son      HPI:  Chief Complaint: Kidney failure  Context: Rafa Bautista is a 89 y.o. male who presents to the ED c/o kidney failure.  Patient has a history of chronic kidney disease, CHF.  Patient was seen by nephrology recently and had outpatient labs done.  Patient was noted to have an increase of creatinine from 2.2 to 4.7 and was sent to the emergency department for further evaluation.  Patient states he feels completely fine.  Patient does have a history of hydronephrosis.            PAST MEDICAL HISTORY  Active Ambulatory Problems     Diagnosis Date Noted    Acute metabolic encephalopathy 10/08/2022    New onset atrial fibrillation 10/08/2022    CHF (congestive heart failure) 10/08/2022    Hypothyroidism 10/08/2022    BPH (benign prostatic hyperplasia) 10/08/2022    Stage 3a chronic kidney disease 06/07/2023     Resolved Ambulatory Problems     Diagnosis Date Noted    No Resolved Ambulatory Problems     Past Medical History:   Diagnosis Date    Atrial fibrillation     Hyperlipidemia     Hypertension          PAST SURGICAL HISTORY  Past Surgical History:   Procedure Laterality Date    BACK SURGERY           FAMILY HISTORY  History reviewed. No pertinent family history.      SOCIAL HISTORY  Social History     Socioeconomic History    Marital status:    Tobacco Use    Smoking status: Former   Vaping Use    Vaping Use: Never used   Substance and Sexual Activity    Alcohol use: Not Currently     Comment: rarely    Drug use: No    Sexual activity: Defer         ALLERGIES  Doxazosin, Penicillins, and Tamsulosin        REVIEW OF SYSTEMS  Review of Systems   All other systems reviewed and are negative.           PHYSICAL EXAM  ED Triage Vitals   Temp Heart Rate Resp BP SpO2   10/23/23 1609 10/23/23 1609 10/23/23 1609 10/23/23 1611 10/23/23 1609   96.2 °F (35.7 °C) (!) 122 16 122/70 97 %      Temp src  Heart Rate Source Patient Position BP Location FiO2 (%)   10/23/23 1609 10/23/23 1609 10/23/23 1611 10/23/23 1611 --   Tympanic Monitor Sitting Left arm        Physical Exam      GENERAL: no acute distress  HENT: nares patent  EYES: no scleral icterus  CV: regular rhythm, normal rate  RESPIRATORY: normal effort  ABDOMEN: soft  MUSCULOSKELETAL: no deformity  NEURO: alert, moves all extremities, follows commands  PSYCH:  calm, cooperative  SKIN: warm, dry    Vital signs and nursing notes reviewed.          LAB RESULTS  Recent Results (from the past 24 hour(s))   Comprehensive Metabolic Panel    Collection Time: 10/23/23  4:30 PM    Specimen: Blood   Result Value Ref Range    Glucose 127 (H) 65 - 99 mg/dL    BUN 79 (H) 8 - 23 mg/dL    Creatinine 4.73 (H) 0.76 - 1.27 mg/dL    Sodium 141 136 - 145 mmol/L    Potassium 4.5 3.5 - 5.2 mmol/L    Chloride 108 (H) 98 - 107 mmol/L    CO2 16.1 (L) 22.0 - 29.0 mmol/L    Calcium 8.7 8.2 - 9.6 mg/dL    Total Protein 7.7 6.0 - 8.5 g/dL    Albumin 3.9 3.5 - 5.2 g/dL    ALT (SGPT) 7 1 - 41 U/L    AST (SGOT) 8 1 - 40 U/L    Alkaline Phosphatase 94 39 - 117 U/L    Total Bilirubin 0.5 0.0 - 1.2 mg/dL    Globulin 3.8 gm/dL    A/G Ratio 1.0 g/dL    BUN/Creatinine Ratio 16.7 7.0 - 25.0    Anion Gap 16.9 (H) 5.0 - 15.0 mmol/L    eGFR 10.8 (L) >60.0 mL/min/1.73   CBC Auto Differential    Collection Time: 10/23/23  4:30 PM    Specimen: Blood   Result Value Ref Range    WBC 11.30 (H) 3.40 - 10.80 10*3/mm3    RBC 3.92 (L) 4.14 - 5.80 10*6/mm3    Hemoglobin 11.1 (L) 13.0 - 17.7 g/dL    Hematocrit 34.0 (L) 37.5 - 51.0 %    MCV 86.7 79.0 - 97.0 fL    MCH 28.3 26.6 - 33.0 pg    MCHC 32.6 31.5 - 35.7 g/dL    RDW 14.6 12.3 - 15.4 %    RDW-SD 46.5 37.0 - 54.0 fl    MPV 9.4 6.0 - 12.0 fL    Platelets 306 140 - 450 10*3/mm3    Neutrophil % 79.4 (H) 42.7 - 76.0 %    Lymphocyte % 8.1 (L) 19.6 - 45.3 %    Monocyte % 9.3 5.0 - 12.0 %    Eosinophil % 2.0 0.3 - 6.2 %    Basophil % 0.5 0.0 - 1.5 %    Immature  Grans % 0.7 (H) 0.0 - 0.5 %    Neutrophils, Absolute 8.96 (H) 1.70 - 7.00 10*3/mm3    Lymphocytes, Absolute 0.92 0.70 - 3.10 10*3/mm3    Monocytes, Absolute 1.05 (H) 0.10 - 0.90 10*3/mm3    Eosinophils, Absolute 0.23 0.00 - 0.40 10*3/mm3    Basophils, Absolute 0.06 0.00 - 0.20 10*3/mm3    Immature Grans, Absolute 0.08 (H) 0.00 - 0.05 10*3/mm3    nRBC 0.0 0.0 - 0.2 /100 WBC   Urinalysis With Microscopic If Indicated (No Culture) - Urine, Clean Catch    Collection Time: 10/23/23  5:02 PM    Specimen: Urine, Clean Catch   Result Value Ref Range    Color, UA Yellow Yellow, Straw    Appearance, UA Turbid (A) Clear    pH, UA 5.5 5.0 - 8.0    Specific Gravity, UA 1.012 1.005 - 1.030    Glucose, UA Negative Negative    Ketones, UA Negative Negative    Bilirubin, UA Negative Negative    Blood, UA Large (3+) (A) Negative    Protein,  mg/dL (2+) (A) Negative    Leuk Esterase, UA Large (3+) (A) Negative    Nitrite, UA Negative Negative    Urobilinogen, UA 0.2 E.U./dL 0.2 - 1.0 E.U./dL   Urinalysis, Microscopic Only - Urine, Clean Catch    Collection Time: 10/23/23  5:02 PM    Specimen: Urine, Clean Catch   Result Value Ref Range    RBC, UA Unable to determine due to loaded field (A) None Seen, 0-2 /HPF    WBC, UA Too Numerous to Count (A) None Seen, 0-2 /HPF    Bacteria, UA Unable to determine due to loaded field (A) None Seen /HPF    Squamous Epithelial Cells, UA Unable to determine due to loaded field (A) None Seen, 0-2 /HPF    Hyaline Casts, UA Unable to determine due to loaded field None Seen /LPF    Methodology Manual Light Microscopy        Ordered the above labs and reviewed the results.        RADIOLOGY  No Radiology Exams Resulted Within Past 24 Hours    Ordered the above noted radiological studies. Reviewed by me in PACS.            MEDICATIONS GIVEN IN ER  Medications   sodium chloride 0.9 % bolus 500 mL (500 mL Intravenous New Bag 10/23/23 7128)   acetaminophen (TYLENOL) tablet 650 mg (has no administration in  time range)   sennosides-docusate (PERICOLACE) 8.6-50 MG per tablet 2 tablet (has no administration in time range)     And   polyethylene glycol (MIRALAX) packet 17 g (has no administration in time range)     And   bisacodyl (DULCOLAX) EC tablet 5 mg (has no administration in time range)     And   bisacodyl (DULCOLAX) suppository 10 mg (has no administration in time range)   ondansetron (ZOFRAN) tablet 4 mg (has no administration in time range)     Or   ondansetron (ZOFRAN) injection 4 mg (has no administration in time range)   melatonin tablet 3 mg (has no administration in time range)                   MEDICAL DECISION MAKING, PROGRESS, and CONSULTS    All labs have been independently reviewed by me.  All radiology studies have been reviewed by me and I have also reviewed the radiology report.   EKG's independently viewed and interpreted by me.  Discussion below represents my analysis of pertinent findings related to patient's condition, differential diagnosis, treatment plan and final disposition.      Additional sources:  - Discussed/ obtained information from independent historians: Son at bedside    - External (non-ED) record review: Office visit with cardiology from 6/7/2023 reviewed and notable for presentation secondary to chronic systolic congestive heart failure, A-fib and kidney disease.  Plan at that time was to continue with current medications and return to clinic in 6 months.    - Chronic or social conditions impacting care: CKD, CHF, A-fib    - Shared decision making: Utilizing shared decision-making techniques we discussed today's findings and plan moving forward at this time we elected to pursue inpatient evaluation      Orders placed during this visit:  Orders Placed This Encounter   Procedures    CT Abdomen Pelvis Without Contrast    Comprehensive Metabolic Panel    Urinalysis With Microscopic If Indicated (No Culture) - Urine, Clean Catch    CBC Auto Differential    Urinalysis, Microscopic Only  - Urine, Clean Catch    Basic Metabolic Panel    CBC (No Diff)    Diet: Cardiac Diets; Healthy Heart (2-3 Na+); Texture: Regular Texture (IDDSI 7); Fluid Consistency: Thin (IDDSI 0)    Vital Signs    Up with assistance    Daily Weights    Strict Intake & Output    Oral Care    Place Sequential Compression Device    Maintain Sequential Compression Device    Code Status and Medical Interventions:    [facility]HA (on-call MD unless specified) Details    Inpatient Nephrology Consult    Inpatient Admission    CBC & Differential         Differential diagnosis includes but is not limited to:    Acute on chronic kidney injury, lab error, dehydration      Independent interpretation of labs, radiology studies, and discussions with consultants:  ED Course as of 10/23/23 1828   Mon Oct 23, 2023   1710 Creatinine(!): 4.73 [MW]   1710 Hemoglobin(!): 11.1 [MW]   1710 WBC(!): 11.30 [MW]   1732 Leukocytes, UA(!): Large (3+) [MW]      ED Course User Index  [MW] Johnny Mayberry MD         DIAGNOSIS  Final diagnoses:   DILLON (acute kidney injury)         DISPOSITION  ED Disposition       ED Disposition   Decision to Admit    Condition   --    Comment   Level of Care: Telemetry [5]   Diagnosis: DILLON (acute kidney injury) [[id number]]   Admitting Physician: MABEL CAMPBELL [[id number]]   Attending Physician: MABEL CAMPBELL [[id number]]   Certification: I certify that inpatient services are medically necessary for this patient for a duration of greater than two midnights. See H&P and MD Progress Notes for additional information about the patient's course of treatment.                           Latest Documented Vital Signs:  As of 18:28 EDT  BP- 117/66 HR- 68 Temp- 96.2 °F (35.7 °C) (Tympanic) O2 sat- 99%              --    Please note that portions of this were completed with a voice recognition program.       Note Disclaimer: At [facility], we believe that sharing information builds trust and better relationships. You are receiving this note  because you are receiving care at [facility] or recently visited. It is possible you will see health information before a provider has talked with you about it. This kind of information can be easy to misunderstand. To help you fully understand what it means for your health, we urge you to discuss this note with your provider.             Johnny Mayberry MD  10/23/23 4203

## 2023-10-24 PROBLEM — I48.91 ATRIAL FIBRILLATION: Status: ACTIVE | Noted: 2023-10-24

## 2023-10-24 LAB
ANION GAP SERPL CALCULATED.3IONS-SCNC: 14.6 MMOL/L (ref 5–15)
BUN SERPL-MCNC: 74 MG/DL (ref 8–23)
BUN/CREAT SERPL: 17.6 (ref 7–25)
CALCIUM SPEC-SCNC: 7.8 MG/DL (ref 8.2–9.6)
CHLORIDE SERPL-SCNC: 112 MMOL/L (ref 98–107)
CO2 SERPL-SCNC: 14.4 MMOL/L (ref 22–29)
CREAT SERPL-MCNC: 4.21 MG/DL (ref 0.76–1.27)
DEPRECATED RDW RBC AUTO: 46.7 FL (ref 37–54)
EGFRCR SERPLBLD CKD-EPI 2021: 12.4 ML/MIN/1.73
ERYTHROCYTE [DISTWIDTH] IN BLOOD BY AUTOMATED COUNT: 14.5 % (ref 12.3–15.4)
FERRITIN SERPL-MCNC: 267 NG/ML (ref 30–400)
FOLATE SERPL-MCNC: 8.65 NG/ML (ref 4.78–24.2)
GLUCOSE SERPL-MCNC: 105 MG/DL (ref 65–99)
HCT VFR BLD AUTO: 25.7 % (ref 37.5–51)
HCT VFR BLD AUTO: 28.2 % (ref 37.5–51)
HGB BLD-MCNC: 8.3 G/DL (ref 13–17.7)
HGB BLD-MCNC: 9.1 G/DL (ref 13–17.7)
IRON 24H UR-MRATE: 46 MCG/DL (ref 59–158)
IRON SATN MFR SERPL: 21 % (ref 20–50)
MAGNESIUM SERPL-MCNC: 1.8 MG/DL (ref 1.7–2.3)
MCH RBC QN AUTO: 28.5 PG (ref 26.6–33)
MCHC RBC AUTO-ENTMCNC: 32.3 G/DL (ref 31.5–35.7)
MCV RBC AUTO: 88.3 FL (ref 79–97)
PHOSPHATE SERPL-MCNC: 5.2 MG/DL (ref 2.5–4.5)
PLATELET # BLD AUTO: 240 10*3/MM3 (ref 140–450)
PMV BLD AUTO: 9.7 FL (ref 6–12)
POTASSIUM SERPL-SCNC: 4.2 MMOL/L (ref 3.5–5.2)
PSA SERPL-MCNC: 26.1 NG/ML (ref 0–4)
RBC # BLD AUTO: 2.91 10*6/MM3 (ref 4.14–5.8)
SODIUM SERPL-SCNC: 141 MMOL/L (ref 136–145)
TIBC SERPL-MCNC: 221 MCG/DL (ref 298–536)
TRANSFERRIN SERPL-MCNC: 148 MG/DL (ref 200–360)
VIT B12 BLD-MCNC: 773 PG/ML (ref 211–946)
WBC NRBC COR # BLD: 8.7 10*3/MM3 (ref 3.4–10.8)

## 2023-10-24 PROCEDURE — 82607 VITAMIN B-12: CPT | Performed by: STUDENT IN AN ORGANIZED HEALTH CARE EDUCATION/TRAINING PROGRAM

## 2023-10-24 PROCEDURE — 85014 HEMATOCRIT: CPT | Performed by: STUDENT IN AN ORGANIZED HEALTH CARE EDUCATION/TRAINING PROGRAM

## 2023-10-24 PROCEDURE — 84466 ASSAY OF TRANSFERRIN: CPT | Performed by: STUDENT IN AN ORGANIZED HEALTH CARE EDUCATION/TRAINING PROGRAM

## 2023-10-24 PROCEDURE — 82728 ASSAY OF FERRITIN: CPT | Performed by: STUDENT IN AN ORGANIZED HEALTH CARE EDUCATION/TRAINING PROGRAM

## 2023-10-24 PROCEDURE — 84100 ASSAY OF PHOSPHORUS: CPT | Performed by: STUDENT IN AN ORGANIZED HEALTH CARE EDUCATION/TRAINING PROGRAM

## 2023-10-24 PROCEDURE — 85018 HEMOGLOBIN: CPT | Performed by: STUDENT IN AN ORGANIZED HEALTH CARE EDUCATION/TRAINING PROGRAM

## 2023-10-24 PROCEDURE — 25010000002 CEFTRIAXONE PER 250 MG: Performed by: INTERNAL MEDICINE

## 2023-10-24 PROCEDURE — 83735 ASSAY OF MAGNESIUM: CPT | Performed by: STUDENT IN AN ORGANIZED HEALTH CARE EDUCATION/TRAINING PROGRAM

## 2023-10-24 PROCEDURE — 80048 BASIC METABOLIC PNL TOTAL CA: CPT | Performed by: INTERNAL MEDICINE

## 2023-10-24 PROCEDURE — 36415 COLL VENOUS BLD VENIPUNCTURE: CPT | Performed by: INTERNAL MEDICINE

## 2023-10-24 PROCEDURE — 83540 ASSAY OF IRON: CPT | Performed by: STUDENT IN AN ORGANIZED HEALTH CARE EDUCATION/TRAINING PROGRAM

## 2023-10-24 PROCEDURE — 82746 ASSAY OF FOLIC ACID SERUM: CPT | Performed by: STUDENT IN AN ORGANIZED HEALTH CARE EDUCATION/TRAINING PROGRAM

## 2023-10-24 PROCEDURE — 84153 ASSAY OF PSA TOTAL: CPT | Performed by: STUDENT IN AN ORGANIZED HEALTH CARE EDUCATION/TRAINING PROGRAM

## 2023-10-24 PROCEDURE — 85027 COMPLETE CBC AUTOMATED: CPT | Performed by: INTERNAL MEDICINE

## 2023-10-24 RX ORDER — SILODOSIN 8 MG/1
8 CAPSULE ORAL DAILY
Status: DISCONTINUED | OUTPATIENT
Start: 2023-10-24 | End: 2023-10-25 | Stop reason: HOSPADM

## 2023-10-24 RX ORDER — TAMSULOSIN HYDROCHLORIDE 0.4 MG/1
0.4 CAPSULE ORAL NIGHTLY
Status: DISCONTINUED | OUTPATIENT
Start: 2023-10-24 | End: 2023-10-25 | Stop reason: HOSPADM

## 2023-10-24 RX ADMIN — METOPROLOL TARTRATE 12.5 MG: 25 TABLET, FILM COATED ORAL at 21:28

## 2023-10-24 RX ADMIN — LEVOTHYROXINE SODIUM 50 MCG: 50 TABLET ORAL at 05:20

## 2023-10-24 RX ADMIN — CEFTRIAXONE 2000 MG: 2 INJECTION, POWDER, FOR SOLUTION INTRAMUSCULAR; INTRAVENOUS at 21:30

## 2023-10-24 RX ADMIN — CARVEDILOL 12.5 MG: 12.5 TABLET, FILM COATED ORAL at 08:55

## 2023-10-24 RX ADMIN — APIXABAN 2.5 MG: 2.5 TABLET, FILM COATED ORAL at 21:21

## 2023-10-24 RX ADMIN — SODIUM BICARBONATE: 84 INJECTION, SOLUTION INTRAVENOUS at 12:32

## 2023-10-24 RX ADMIN — Medication 3 MG: at 21:20

## 2023-10-24 RX ADMIN — APIXABAN 2.5 MG: 2.5 TABLET, FILM COATED ORAL at 08:55

## 2023-10-24 RX ADMIN — TAMSULOSIN HYDROCHLORIDE 0.4 MG: 0.4 CAPSULE ORAL at 21:20

## 2023-10-24 NOTE — PROGRESS NOTES
Discharge Planning Assessment  [facility]     Patient Name: Rafa Bautista  MRN: [id number]  Today's Date: 10/24/2023    Admit Date: 10/23/2023    Plan: Home with family support   Discharge Needs Assessment       Row Name 10/24/23 7250       Living Environment    People in Home spouse    Name(s) of People in Home Ling    Current Living Arrangements home    Potentially Unsafe Housing Conditions none    In the past 12 months has the electric, gas, oil, or water company threatened to shut off services in your home? No    Primary Care Provided by self;spouse/significant other;child(hanna)    Provides Primary Care For no one, unable/limited ability to care for self    Family Caregiver if Needed child(hanna), adult;spouse    Family Caregiver Names Spouse, Ling and sonChase    Quality of Family Relationships helpful;involved;supportive    Able to Return to Prior Arrangements yes       Resource/Environmental Concerns    Resource/Environmental Concerns none       Food Insecurity    Within the past 12 months, you worried that your food would run out before you got the money to buy more. Never true    Within the past 12 months, the food you bought just didn't last and you didn't have money to get more. Never true       Transition Planning    Patient/Family Anticipates Transition to home with family    Patient/Family Anticipated Services at Transition none    Transportation Anticipated family or friend will provide       Discharge Needs Assessment    Readmission Within the Last 30 Days no previous admission in last 30 days    Equipment Currently Used at Home walker, rolling;cane, straight    Concerns to be Addressed denies needs/concerns at this time    Anticipated Changes Related to Illness none    Equipment Needed After Discharge none    Provided Post Acute Provider List? N/A    Provided Post Acute Provider Quality & Resource List? N/A    Patient's Choice of Community Agency(s) no needs at this time                    Discharge Plan       Row Name 10/24/23 1852       Plan    Plan Home with family support    Patient/Family in Agreement with Plan yes    Plan Comments Met with patient  and son, Chase at bedside. Facesheet verified. Patient lives with his wife, Ling. Patient is IADL , he uses a walker and cane no other DME. PCP is Beverly Lino , preferred pharmacy is [facility] in [place]. Patient's son is helpful and involved, son will assist as needed. Son will provide dc transportation home. Patient denies dc needs at this time, no needs were identified. Plan is home with the help of family. CCP will follow progress.                  Continued Care and Services - Admitted Since 10/23/2023    Coordination has not been started for this encounter.       Expected Discharge Date and Time       Expected Discharge Date Expected Discharge Time    Oct 27, 2023            Demographic Summary    No documentation.                  Functional Status       Row Name 10/24/23 1852       Functional Status    Usual Activity Tolerance good       Assessment of Health Literacy    Health Literacy Excellent       Functional Status, IADL    Medications independent    Meal Preparation independent    Housekeeping assistive equipment    Laundry independent    Shopping assistive equipment and person       Mental Status    General Appearance WDL WDL       Mental Status Summary    Recent Changes in Mental Status/Cognitive Functioning no changes                   Psychosocial    No documentation.                  Abuse/Neglect    No documentation.                  Legal    No documentation.                  Substance Abuse    No documentation.                  Patient Forms    No documentation.                     Valeria Arteaga RN

## 2023-10-24 NOTE — CONSULTS
FIRST UROLOGY CONSULT      Patient Identification:  NAME:  Rafa Bautista  Age:  89 y.o.   Sex:  male   :  1928   MRN:  [id number]       Chief complaint: Difficulty urinating    History of present illness: This is a 89-year-old gentleman who sees Dr. Josr Saul with a history of BPH.  He has had high elevated postvoid residuals.  His PVR in September when he was seen in the office was about 417 and he had had an elevated PVR as high as 654 in December of last year.  He has had some intolerance of tamsulosin in the past may be some dizziness no true allergy such as a skin reaction.  He has been on Scilla dose and 8 mg daily and he was put on 1 to 2 mg Hytrin in the evening.  He was noted to have an elevation of his creatinine and routine lab work so he was brought in for further evaluation.  He said a couple UTIs over the last 6 months that were treated by the nephrology service.  He has had some increasing confusion.  Decrease in appetite.  He states to me that he has been voiding fine without any incident.  His CT imaging showed bilateral hydronephrosis worse on the left than the right.      Past medical history:  Past Medical History:   Diagnosis Date    Atrial fibrillation     BPH (benign prostatic hyperplasia)     CHF (congestive heart failure)     Hyperlipidemia     Hypertension        Past surgical history:  Past Surgical History:   Procedure Laterality Date    BACK SURGERY         Allergies:  Doxazosin, Penicillins, and Tamsulosin    Home medications:  Medications Prior to Admission   Medication Sig Dispense Refill Last Dose    apixaban (ELIQUIS) 2.5 MG tablet tablet Take 1 tablet by mouth Every 12 (Twelve) Hours. Indications: Atrial Fibrillation 180 tablet 1 10/23/2023    carvedilol (COREG) 12.5 MG tablet Take 1 tablet by mouth 2 (Two) Times a Day With Meals. 180 tablet 3 10/23/2023    ferrous gluconate (FERGON) 324 MG tablet Take 1 tablet by mouth Daily With Breakfast.   10/23/2023     finasteride (PROSCAR) 5 MG tablet Take 1 tablet by mouth Daily.   10/23/2023    levothyroxine (SYNTHROID, LEVOTHROID) 50 MCG tablet Take 1 tablet by mouth Every Morning.   10/23/2023    silodosin (RAPAFLO) 4 MG capsule capsule Take 2 capsules by mouth Daily With Breakfast.   10/23/2023    terazosin (HYTRIN) 1 MG capsule Take 1 capsule by mouth Every Night.   10/23/2023        Hospital medications:  apixaban, 2.5 mg, Oral, Q12H  cefTRIAXone, 2,000 mg, Intravenous, Q24H  finasteride, 5 mg, Oral, Daily  levothyroxine, 50 mcg, Oral, Q AM  metoprolol tartrate, 12.5 mg, Oral, Q12H  Non-Formulary / Patient Supplied Medication, 8 mg, Oral, Daily  senna-docusate sodium, 2 tablet, Oral, BID  tamsulosin, 0.4 mg, Oral, Nightly      sodium chloride 0.45 % 925 mL with sodium bicarbonate 8.4 % 75 mEq infusion, , Last Rate: 74 mL/hr at 10/24/23 1232        acetaminophen    senna-docusate sodium **AND** polyethylene glycol **AND** bisacodyl **AND** bisacodyl    melatonin    ondansetron **OR** ondansetron    Family history:  History reviewed. No pertinent family history.    Social history:  Social History     Tobacco Use    Smoking status: Former   Vaping Use    Vaping Use: Never used   Substance Use Topics    Alcohol use: Not Currently     Comment: rarely    Drug use: No       Review of systems:    Negative 12-system ROS except for the following: As above poor historian does not give a great history      Objective:  TMax 24 hours:   Temp (24hrs), Av.9 °F (36.6 °C), Min:97.7 °F (36.5 °C), Max:98 °F (36.7 °C)      Vitals Ranges:   Temp:  [97.7 °F (36.5 °C)-98 °F (36.7 °C)] 98 °F (36.7 °C)  Heart Rate:  [54-84] 84  Resp:  [16-18] 18  BP: (116-135)/(65-74) 128/74    Intake/Output Last 3 shifts:  I/O last 3 completed shifts:  In: 828.8 [P.O.:120; I.V.:608.8; IV Piggyback:100]  Out: 100 [Urine:100]     Physical Exam:       General Appearance:    Alert, cooperative, in no acute distress   Head:    Normocephalic, without obvious  abnormality, atraumatic   Eyes:          PERRL, conjunctivae and corneas clear   Ears:    Normal external inspection   Throat:   No oral lesions, oral mucosa moist   Neck:   Supple, no LAD, trachea midline   Back:     No CVA tenderness   Lungs:     Respirations unlabored, symmetric excursion    Heart:    RRR, intact peripheral pulses   Abdomen:   Soft mildly distended bladder   :  Penis normal testes normal   KB:  Extremities: Deferred.  No edema, no deformity   Skin:   No bleeding, bruising or rashes   Neuro/Psych:   Orientation intact, mood/affect pleasant, no focal findings       Results review:   I reviewed the patient's new clinical results.    Data review:  Lab Results (last 24 hours)       Procedure Component Value Units Date/Time    Vitamin B12 [[id number]]  (Normal) Collected: 10/24/23 1605    Specimen: Blood Updated: 10/24/23 1742     Vitamin B-12 773 pg/mL     Narrative:      Results may be falsely increased if patient taking Biotin.      Folate [[id number]]  (Normal) Collected: 10/24/23 1605    Specimen: Blood Updated: 10/24/23 1742     Folate 8.65 ng/mL     Narrative:      Results may be falsely increased if patient taking Biotin.      Hemoglobin & Hematocrit, Blood [[id number]]  (Abnormal) Collected: 10/24/23 1605    Specimen: Blood Updated: 10/24/23 1702     Hemoglobin 9.1 g/dL      Hematocrit 28.2 %     Ferritin [[id number]]  (Normal) Collected: 10/24/23 0607    Specimen: Blood Updated: 10/24/23 1026     Ferritin 267.00 ng/mL     Narrative:      Results may be falsely decreased if patient taking Biotin.      Iron Profile [[id number]]  (Abnormal) Collected: 10/24/23 0607    Specimen: Blood Updated: 10/24/23 1022     Iron 46 mcg/dL      Iron Saturation (TSAT) 21 %      Transferrin 148 mg/dL      TIBC 221 mcg/dL     Urine Culture - Urine, Urine, Clean Catch [[id number]] Collected: 10/23/23 1702    Specimen: Urine, Clean Catch Updated: 10/24/23 0919    PSA DIAGNOSTIC [[id number]]  (Abnormal) Collected:  10/24/23 0607    Specimen: Blood Updated: 10/24/23 0901     PSA 26.100 ng/mL     Narrative:      Results may be falsely decreased if patient taking Biotin.    Testing Method: [employer] Electrochemiluminescence Immunoassay(ECLIA)  Values obtained with different assay methods or kits cannot be used interchangeably.    Basic Metabolic Panel [[id number]]  (Abnormal) Collected: 10/24/23 0607    Specimen: Blood Updated: 10/24/23 0702     Glucose 105 mg/dL      BUN 74 mg/dL      Creatinine 4.21 mg/dL      Sodium 141 mmol/L      Potassium 4.2 mmol/L      Comment: Slight hemolysis detected by analyzer. Results may be affected.        Chloride 112 mmol/L      CO2 14.4 mmol/L      Calcium 7.8 mg/dL      BUN/Creatinine Ratio 17.6     Anion Gap 14.6 mmol/L      eGFR 12.4 mL/min/1.73      Comment: <15 Indicative of kidney failure       Narrative:      GFR Normal >60  Chronic Kidney Disease <60  Kidney Failure <15    The GFR formula is only valid for adults with stable renal function between ages 18 and 70.    Magnesium [[id number]]  (Normal) Collected: 10/24/23 0607    Specimen: Blood Updated: 10/24/23 0702     Magnesium 1.8 mg/dL     Phosphorus [[id number]]  (Abnormal) Collected: 10/24/23 0607    Specimen: Blood Updated: 10/24/23 0702     Phosphorus 5.2 mg/dL     CBC (No Diff) [[id number]]  (Abnormal) Collected: 10/24/23 0607    Specimen: Blood Updated: 10/24/23 0652     WBC 8.70 10*3/mm3      RBC 2.91 10*6/mm3      Hemoglobin 8.3 g/dL      Hematocrit 25.7 %      MCV 88.3 fL      MCH 28.5 pg      MCHC 32.3 g/dL      RDW 14.5 %      RDW-SD 46.7 fl      MPV 9.7 fL      Platelets 240 10*3/mm3     Lactic Acid, Plasma [[id number]]  (Normal) Collected: 10/23/23 2022    Specimen: Blood Updated: 10/23/23 2134     Lactate 1.2 mmol/L     Blood Culture - Blood, Arm, Left [[id number]] Collected: 10/23/23 2028    Specimen: Blood from Arm, Left Updated: 10/23/23 2052    Blood Culture - Blood, Arm, Right [[id number]] Collected: 10/23/23 2022     Specimen: Blood from Arm, Right Updated: 10/23/23 2052             Imaging:  Imaging Results (Last 24 Hours)       Procedure Component Value Units Date/Time    CT Abdomen Pelvis Without Contrast [[id number]] Collected: 10/23/23 1928     Updated: 10/23/23 1931    Narrative:      CT ABDOMEN AND PELVIS WITHOUT IV CONTRAST     HISTORY: Chronic kidney disease, CHF.     TECHNIQUE:  CT includes axial imaging from the lung bases to the  trochanters without intravenous contrast and without use of oral  contrast. Data reconstructed in coronal and sagittal planes. Radiation  dose reduction techniques were utilized, including automated exposure  control and exposure modulation based on body size.     COMPARISON: None     FINDINGS: Lung bases appear clear and there is no basilar effusion.  Liver, decompressed gallbladder, spleen, adrenal glands, pancreas  exhibit normal noncontrast CT appearance.     There is moderate to severe left and moderate right hydronephrosis and  hydroureter to the level of the urinary bladder. There is a 2 mm left  upper pole nonobstructing renal stone. There is abnormal urinary bladder  wall thickening with urinary bladder trabeculations. There is prostate  gland enlargement with median lobe hypertrophy. There is also pericystic  stranding suggesting cystitis. Urothelial thickening is present  involving both ureters and the right renal pelvis suspicious for upper  tract infection. Peripherally calcified right upper pole renal  low-density lesion measures 3 cm diameter.     There is colonic diverticulosis greatest involving the sigmoid colon.  Inflammation is present along the bladder dome and extending adjacent to  the sigmoid colon where it is difficult exclude concomitant sigmoid  diverticulitis. There is no evidence for abscess or drainable  collection. There is advanced degenerative disease within the lumbar  spine. Bony fusion is present at L2-3. There is facet arthritis in the  mid to lower  lumbar spine. There is also bridging syndesmophyte or  osteophyte formation within the lower thoracic spine.       Impression:      1. Abnormal urinary bladder wall thickening with trabeculations and  surrounding inflammation consistent with cystitis. Inflammation also  extends adjacent to the sigmoid colon at the level of the bladder dome  where it is difficult to exclude concomitant sigmoid diverticulitis.  There is moderate to severe left and moderate right hydronephrosis with  mild urothelial thickening suspicious for upper tract infection.  Prostate gland enlargement with median lobe hypertrophy.  2. 3 cm right upper pole low-density lesion with peripheral  calcification is most likely a mildly complex cyst.  3. 2 mm left upper pole renal nonobstructing stone.     Radiation dose reduction techniques were utilized, including automated  exposure control and exposure modulation based on body size.        This report was finalized on 10/23/2023 7:28 PM by Dr. 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  I would like to try him back on tamsulosin in the evening so use both of these medications and try him on 0.4 mg nightly.  We will just keep an eye on his blood pressure.  We will start intermittent catheterization.    Derian Lake MD  10/24/23  18:53 EDT

## 2023-10-24 NOTE — CONSULTS
[facility] Consult Note      Patient Name: Rafa Bautista  : 1928  MRN: [id number]  Primary Care Physician:  Beverly Lino MD  Referring Physician: Theresa Padilla MD  Date of admission: 10/23/2023    Subjective     Reason for Consult:  Elevated creatinine    HPI:   Rafa Bautista is a 89 y.o. male DILLON on CKD stage 3b with a history of BPH, hydronephrosis, atrial fibrillation and hypertension who was admitted 10/23/2023 when it was noted he had an abnormal creatinine on some routine lab work, he is a patient of Dr. Scruggs.He had an upcoming appointment with Dr. Scruggs this , but when it was discovered his  creatinine was elevated, he was told to go to the ER instead. Patient presented to the ER with no complaints of pain or symptoms of UTI. Per patients son, he sees urology for his BPH problems and was last seen in August. He does have a long history of hydronephrosis and UTI's for which his son says is usually accompanied with increased confusion which was not evident at the time. His son also states that he has noted his father has had a poor appetite over the last month or so. Nephrology was consulted to  DILLON. He is currently denying dizziness, shortness of breath, chest pain, nausea and vomiting and urinary symptoms.          In ER he was noted to be tachycardic but not hypotensive.  Creatinine was 4.7 and patient was started on IV fluid.  Creatinine trended down to 4.2.  Nephrology was consulted for management.  CT scan abdomen pelvis showed signs of cystitis and bilateral hydronephrosis more prominent on the left.  No NSAID use  Review of Systems:   14 point review of systems is otherwise negative except for mentioned above on HPI    Personal History     Past Medical History:   Diagnosis Date    Atrial fibrillation     BPH (benign prostatic hyperplasia)     CHF (congestive heart failure)     Hyperlipidemia     Hypertension         Past Surgical History:   Procedure Laterality Date    BACK SURGERY         Family History: family history is not on file.    Social History:  reports that he has quit smoking. He does not have any smokeless tobacco history on file. He reports that he does not currently use alcohol. He reports that he does not use drugs.    Home Medications:  Prior to Admission medications    Medication Sig Start Date End Date Taking? Authorizing Provider   apixaban (ELIQUIS) 2.5 MG tablet tablet Take 1 tablet by mouth Every 12 (Twelve) Hours. Indications: Atrial Fibrillation 1/10/23  Yes Elsie Le APRN   carvedilol (COREG) 12.5 MG tablet Take 1 tablet by mouth 2 (Two) Times a Day With Meals. 6/7/23  Yes Elsie Le APRN   ferrous gluconate (FERGON) 324 MG tablet Take 1 tablet by mouth Daily With Breakfast.   Yes Myles Uriarte MD   finasteride (PROSCAR) 5 MG tablet Take 1 tablet by mouth Daily.   Yes Myles Uriarte MD   levothyroxine (SYNTHROID, LEVOTHROID) 50 MCG tablet Take 1 tablet by mouth Every Morning.   Yes Myles Uriarte MD   silodosin (RAPAFLO) 4 MG capsule capsule Take 2 capsules by mouth Daily With Breakfast.   Yes Myles Uriarte MD   terazosin (HYTRIN) 1 MG capsule Take 1 capsule by mouth Every Night.   Yes Myles Uriarte MD       Allergies:  Allergies   Allergen Reactions    Doxazosin Other (See Comments)    Penicillins     Tamsulosin Other (See Comments)       Objective     Vitals:   Temp:  [96.2 °F (35.7 °C)-97.7 °F (36.5 °C)] 97.7 °F (36.5 °C)  Heart Rate:  [] 82  Resp:  [16-18] 18  BP: (112-135)/(65-74) 117/69    Intake/Output Summary (Last 24 hours) at 10/24/2023 0902  Last data filed at 10/24/2023 0857  Gross per 24 hour   Intake 1068.75 ml   Output 100 ml   Net 968.75 ml       Physical Exam:   Constitutional: Awake, alert, no acute distress.  HEENT: Sclera anicteric, no conjunctival injection  Neck: Supple, no thyromegaly, no  lymphadenopathy, trachea at midline, no JVD  Respiratory: Clear to auscultation bilaterally, nonlabored respiration  Cardiovascular: RRR, no murmurs, no rubs or gallops, no carotid bruit  Gastrointestinal: Positive bowel sounds, abdomen is soft, nontender and nondistended  : No palpable bladder  Musculoskeletal: No edema, no clubbing or cyanosis  Psychiatric: Appropriate affect, cooperative  Neurologic: Oriented x3, moving all extremities, normal speech and mental status  Skin: Warm and dry       Scheduled Meds:     apixaban, 2.5 mg, Oral, Q12H  carvedilol, 12.5 mg, Oral, BID With Meals  cefTRIAXone, 2,000 mg, Intravenous, Q24H  finasteride, 5 mg, Oral, Daily  levothyroxine, 50 mcg, Oral, Q AM  senna-docusate sodium, 2 tablet, Oral, BID  tamsulosin, 0.4 mg, Oral, Daily  terazosin, 1 mg, Oral, Nightly      IV Meds:   sodium chloride, 75 mL/hr, Last Rate: 75 mL/hr (10/23/23 2127)        Results Reviewed:   I have personally reviewed the results from the time of this admission to 10/24/2023 09:02 EDT     Lab Results   Component Value Date    GLUCOSE 105 (H) 10/24/2023    CALCIUM 7.8 (L) 10/24/2023     10/24/2023    K 4.2 10/24/2023    CO2 14.4 (L) 10/24/2023     (H) 10/24/2023    BUN 74 (H) 10/24/2023    CREATININE 4.21 (H) 10/24/2023    EGFRIFNONA 86 11/04/2017    BCR 17.6 10/24/2023    ANIONGAP 14.6 10/24/2023      Lab Results   Component Value Date    MG 1.8 10/24/2023    PHOS 5.2 (H) 10/24/2023    ALBUMIN 3.9 10/23/2023           Assessment / Plan     ASSESSMENT:  DILLON on CKD stage 3b possibly likely prerenal / ATN  secondary to possible hypovolemia due to decreased oral intake, UTI and postobstructive etiology.  Creatinine trending down with IV hydration pointing toward a possible prerenal component  Normal anion gap metabolic acidosis  Anemia of CKD  Hypocalcemia  Possible UTI/prostatitis  Bilateral hydronephrosis likely secondary to urinary retention.  Will consult urology        PLAN:  We will  change IV fluid to IV bicarbonate given normal anion gap metabolic acidosis at 75 cc an hour.  Consult urology  We will check vitamin D level and PTH given hypocalcemia  Check iron levels  check a bladder scan to rule out urinary retention  Labs in AM.      Thank you for involving us in the care of Rafa Bautista.  Please feel free to call with any questions.    Ariel West MD  10/24/23  09:02 EDT    [facility]  [phone]    Parts of this note may be an electronic transcription/translation of spoken language to printed text using the Dragon dictation system.

## 2023-10-24 NOTE — H&P
HISTORY AND PHYSICAL   [facility]        Date of Admission: 10/23/2023  Patient Identification:  Name: Rafa Bautista  Age: 89 y.o.  Sex: male  :  1928  MRN: [id number]                     Primary Care Physician: Beverly Lino MD    Chief Complaint:  89 year old gentleman who presented to the emergency room because of abnormal labs; he has a history of ckd but his creatinine has doubled; he says he feels fine; denies pain or nausea; denies fever or chills    History of Present Illness:   As above    Past Medical History:  Past Medical History:   Diagnosis Date    Atrial fibrillation     BPH (benign prostatic hyperplasia)     CHF (congestive heart failure)     Hyperlipidemia     Hypertension      Past Surgical History:  Past Surgical History:   Procedure Laterality Date    BACK SURGERY        Home Meds:  Medications Prior to Admission   Medication Sig Dispense Refill Last Dose    apixaban (ELIQUIS) 2.5 MG tablet tablet Take 1 tablet by mouth Every 12 (Twelve) Hours. Indications: Atrial Fibrillation 180 tablet 1 10/23/2023    carvedilol (COREG) 12.5 MG tablet Take 1 tablet by mouth 2 (Two) Times a Day With Meals. 180 tablet 3 10/23/2023    ferrous gluconate (FERGON) 324 MG tablet Take 1 tablet by mouth Daily With Breakfast.   10/23/2023    finasteride (PROSCAR) 5 MG tablet Take 1 tablet by mouth Daily.   10/23/2023    levothyroxine (SYNTHROID, LEVOTHROID) 50 MCG tablet Take 1 tablet by mouth Every Morning.   10/23/2023    silodosin (RAPAFLO) 4 MG capsule capsule Take 2 capsules by mouth Daily With Breakfast.   10/23/2023    terazosin (HYTRIN) 1 MG capsule Take 1 capsule by mouth Every Night.   10/23/2023       Allergies:  Allergies   Allergen Reactions    Doxazosin Other (See Comments)    Penicillins     Tamsulosin Other (See Comments)     Immunizations:  Immunization History   Administered Date(s) Administered    COVID-19 (PFIZER) Purple Cap Monovalent 2021, 2021,  "10/09/2021     Social History:   Social History     Social History Narrative    Not on file     Social History     Socioeconomic History    Marital status:    Tobacco Use    Smoking status: Former   Vaping Use    Vaping Use: Never used   Substance and Sexual Activity    Alcohol use: Not Currently     Comment: rarely    Drug use: No    Sexual activity: Defer       Family History:  History reviewed. No pertinent family history.     Review of Systems  See history of present illness and past medical history.  Patient denies headache, dizziness, syncope, falls, trauma, change in vision, change in hearing, change in taste, changes in weight, changes in appetite, focal weakness, numbness, or paresthesia.  Patient denies chest pain, palpitations, dyspnea, orthopnea, PND, cough, sinus pressure, rhinorrhea, epistaxis, hemoptysis, nausea, vomiting,hematemesis, diarrhea, constipation or hematochezia.  Denies cold or heat intolerance, polydipsia, polyuria, polyphagia. Denies hematuria, pyuria, dysuria, hesitancy, frequency or urgency. Denies consumption of raw and under cooked meats foods or change in water source.  Denies fever, chills, sweats, night sweats.     Objective:  T Max 24 hrs: Temp (24hrs), Av.2 °F (35.7 °C), Min:96.2 °F (35.7 °C), Max:96.2 °F (35.7 °C)    Vitals Ranges:   Temp:  [96.2 °F (35.7 °C)] 96.2 °F (35.7 °C)  Heart Rate:  [] 83  Resp:  [16] 16  BP: (112-135)/(66-74) 135/74      Exam:  /74 (BP Location: Right arm, Patient Position: Sitting)   Pulse 83   Temp 96.2 °F (35.7 °C) (Tympanic)   Resp 16   Ht 182.9 cm (72\")   Wt 74.4 kg (164 lb)   SpO2 99%   BMI 22.24 kg/m²     General Appearance:    Alert, cooperative, no distress, appears stated age   Head:    Normocephalic, without obvious abnormality, atraumatic   Eyes:    PERRL, conjunctivae/corneas clear, EOM's intact, both eyes   Ears:    Normal external ear canals, both ears   Nose:   Nares normal, septum midline, mucosa normal, " no drainage    or sinus tenderness   Throat:   Lips, mucosa, and tongue normal   Neck:   Supple, symmetrical, trachea midline, no adenopathy;     thyroid:  no enlargement/tenderness/nodules; no carotid    bruit or JVD   Back:     Symmetric, no curvature, ROM normal, no CVA tenderness   Lungs:     Decreased breath sounds bilaterally, respirations unlabored   Chest Wall:    No tenderness or deformity    Heart:    Regular rate and rhythm, S1 and S2 normal, no murmur, rub   or gallop   Abdomen:     Soft, nontender, bowel sounds active all four quadrants,     no masses, no hepatomegaly, no splenomegaly   Extremities:   Extremities normal, atraumatic, no cyanosis or edema                       .    Data Review:  Labs in chart were reviewed.  WBC   Date Value Ref Range Status   10/23/2023 11.30 (H) 3.40 - 10.80 10*3/mm3 Final     Hemoglobin   Date Value Ref Range Status   10/23/2023 11.1 (L) 13.0 - 17.7 g/dL Final     Hematocrit   Date Value Ref Range Status   10/23/2023 34.0 (L) 37.5 - 51.0 % Final     Platelets   Date Value Ref Range Status   10/23/2023 306 140 - 450 10*3/mm3 Final     Sodium   Date Value Ref Range Status   10/23/2023 141 136 - 145 mmol/L Final     Potassium   Date Value Ref Range Status   10/23/2023 4.5 3.5 - 5.2 mmol/L Final     Chloride   Date Value Ref Range Status   10/23/2023 108 (H) 98 - 107 mmol/L Final     CO2   Date Value Ref Range Status   10/23/2023 16.1 (L) 22.0 - 29.0 mmol/L Final     BUN   Date Value Ref Range Status   10/23/2023 79 (H) 8 - 23 mg/dL Final     Creatinine   Date Value Ref Range Status   10/23/2023 4.73 (H) 0.76 - 1.27 mg/dL Final     Glucose   Date Value Ref Range Status   10/23/2023 127 (H) 65 - 99 mg/dL Final     Calcium   Date Value Ref Range Status   10/23/2023 8.7 8.2 - 9.6 mg/dL Final                Imaging Results (All)       Procedure Component Value Units Date/Time    CT Abdomen Pelvis Without Contrast [[id number]] Collected: 10/23/23 1928     Updated: 10/23/23 1931     Narrative:      CT ABDOMEN AND PELVIS WITHOUT IV CONTRAST     HISTORY: Chronic kidney disease, CHF.     TECHNIQUE:  CT includes axial imaging from the lung bases to the  trochanters without intravenous contrast and without use of oral  contrast. Data reconstructed in coronal and sagittal planes. Radiation  dose reduction techniques were utilized, including automated exposure  control and exposure modulation based on body size.     COMPARISON: None     FINDINGS: Lung bases appear clear and there is no basilar effusion.  Liver, decompressed gallbladder, spleen, adrenal glands, pancreas  exhibit normal noncontrast CT appearance.     There is moderate to severe left and moderate right hydronephrosis and  hydroureter to the level of the urinary bladder. There is a 2 mm left  upper pole nonobstructing renal stone. There is abnormal urinary bladder  wall thickening with urinary bladder trabeculations. There is prostate  gland enlargement with median lobe hypertrophy. There is also pericystic  stranding suggesting cystitis. Urothelial thickening is present  involving both ureters and the right renal pelvis suspicious for upper  tract infection. Peripherally calcified right upper pole renal  low-density lesion measures 3 cm diameter.     There is colonic diverticulosis greatest involving the sigmoid colon.  Inflammation is present along the bladder dome and extending adjacent to  the sigmoid colon where it is difficult exclude concomitant sigmoid  diverticulitis. There is no evidence for abscess or drainable  collection. There is advanced degenerative disease within the lumbar  spine. Bony fusion is present at L2-3. There is facet arthritis in the  mid to lower lumbar spine. There is also bridging syndesmophyte or  osteophyte formation within the lower thoracic spine.       Impression:      1. Abnormal urinary bladder wall thickening with trabeculations and  surrounding inflammation consistent with cystitis. Inflammation  also  extends adjacent to the sigmoid colon at the level of the bladder dome  where it is difficult to exclude concomitant sigmoid diverticulitis.  There is moderate to severe left and moderate right hydronephrosis with  mild urothelial thickening suspicious for upper tract infection.  Prostate gland enlargement with median lobe hypertrophy.  2. 3 cm right upper pole low-density lesion with peripheral  calcification is most likely a mildly complex cyst.  3. 2 mm left upper pole renal nonobstructing stone.     Radiation dose reduction techniques were utilized, including automated  exposure control and exposure modulation based on body size.        This report was finalized on 10/23/2023 7:28 PM by Dr. Ck Clarke M.D on Workstation: [id number]                 Assessment:  Active Hospital Problems    Diagnosis  POA    **DILLON (acute kidney injury) [N17.9]  Yes      Resolved Hospital Problems   No resolved problems to display.   Uti  Hydronephrosis  A fib  Hypertension  Chf  Underweight  Hydronephrosis  Hypothyroidism  Ckd3  anemia    Plan:  Will start antibiotics  Trend labs  Fluids  Ask urology and nephrology to see him  Monitor on telemetry  Dw patient and family as well as ed provider    Theresa Padilla MD  10/23/2023  21:37 EDT

## 2023-10-24 NOTE — PROGRESS NOTES
Name: Rafa Bautista ADMIT: 10/23/2023   : 1928  PCP: Beverly Lino MD    MRN: [id number] LOS: 1 days   AGE/SEX: 89 y.o. male  ROOM: [facility]     Subjective   Subjective   Laying in bed, son present at bedside.  No new events overnight.  Patient denies urinary symptoms, denies fevers chills nausea or vomiting.  Discussed with son at bedside, patient has had frequent UTIs, and usually UTIs patient becomes confused, but it was not the case this time.    Review of Systems   As above  Objective   Objective   Vital Signs  Temp:  [96.2 °F (35.7 °C)-97.7 °F (36.5 °C)] 97.7 °F (36.5 °C)  Heart Rate:  [] 82  Resp:  [16-18] 18  BP: (112-135)/(65-74) 117/69  SpO2:  [97 %-100 %] 99 %  on   ;   Device (Oxygen Therapy): room air  Body mass index is 21.99 kg/m².  Physical Exam    General: Sitting up in the bed, not in distress, elderly, frail-appearing  HEENT: Normocephalic, atraumatic  CV: Regular rate and rhythm, no murmurs   Lungs: CTA, no wheezing,  Abdomen: Soft, nontender, nondistended  Extremities: No significant peripheral edema , no cyanosis     Results Review     I reviewed the patient's new clinical results.  Results from last 7 days   Lab Units 10/24/23  0607 10/23/23  1630   WBC 10*3/mm3 8.70 11.30*   HEMOGLOBIN g/dL 8.3* 11.1*   PLATELETS 10*3/mm3 240 306     Results from last 7 days   Lab Units 10/24/23  0607 10/23/23  1630   SODIUM mmol/L 141 141   POTASSIUM mmol/L 4.2 4.5   CHLORIDE mmol/L 112* 108*   CO2 mmol/L 14.4* 16.1*   BUN mg/dL 74* 79*   CREATININE mg/dL 4.21* 4.73*   GLUCOSE mg/dL 105* 127*   Estimated Creatinine Clearance: 11.2 mL/min (A) (by C-G formula based on SCr of 4.21 mg/dL (H)).  Results from last 7 days   Lab Units 10/23/23  1630   ALBUMIN g/dL 3.9   BILIRUBIN mg/dL 0.5   ALK PHOS U/L 94   AST (SGOT) U/L 8   ALT (SGPT) U/L 7     Results from last 7 days   Lab Units 10/24/23  0607 10/23/23  1630   CALCIUM mg/dL 7.8* 8.7   ALBUMIN g/dL  --  3.9   MAGNESIUM mg/dL 1.8  --   "  PHOSPHORUS mg/dL 5.2*  --      Results from last 7 days   Lab Units 10/23/23  2022   LACTATE mmol/L 1.2     COVID19   Date Value Ref Range Status   10/08/2022 Not Detected Not Detected - Ref. Range Final     No results found for: \"HGBA1C\", \"POCGLU\"        CT Abdomen Pelvis Without Contrast  Narrative: CT ABDOMEN AND PELVIS WITHOUT IV CONTRAST     HISTORY: Chronic kidney disease, CHF.     TECHNIQUE:  CT includes axial imaging from the lung bases to the  trochanters without intravenous contrast and without use of oral  contrast. Data reconstructed in coronal and sagittal planes. Radiation  dose reduction techniques were utilized, including automated exposure  control and exposure modulation based on body size.     COMPARISON: None     FINDINGS: Lung bases appear clear and there is no basilar effusion.  Liver, decompressed gallbladder, spleen, adrenal glands, pancreas  exhibit normal noncontrast CT appearance.     There is moderate to severe left and moderate right hydronephrosis and  hydroureter to the level of the urinary bladder. There is a 2 mm left  upper pole nonobstructing renal stone. There is abnormal urinary bladder  wall thickening with urinary bladder trabeculations. There is prostate  gland enlargement with median lobe hypertrophy. There is also pericystic  stranding suggesting cystitis. Urothelial thickening is present  involving both ureters and the right renal pelvis suspicious for upper  tract infection. Peripherally calcified right upper pole renal  low-density lesion measures 3 cm diameter.     There is colonic diverticulosis greatest involving the sigmoid colon.  Inflammation is present along the bladder dome and extending adjacent to  the sigmoid colon where it is difficult exclude concomitant sigmoid  diverticulitis. There is no evidence for abscess or drainable  collection. There is advanced degenerative disease within the lumbar  spine. Bony fusion is present at L2-3. There is facet arthritis " in the  mid to lower lumbar spine. There is also bridging syndesmophyte or  osteophyte formation within the lower thoracic spine.     Impression: 1. Abnormal urinary bladder wall thickening with trabeculations and  surrounding inflammation consistent with cystitis. Inflammation also  extends adjacent to the sigmoid colon at the level of the bladder dome  where it is difficult to exclude concomitant sigmoid diverticulitis.  There is moderate to severe left and moderate right hydronephrosis with  mild urothelial thickening suspicious for upper tract infection.  Prostate gland enlargement with median lobe hypertrophy.  2. 3 cm right upper pole low-density lesion with peripheral  calcification is most likely a mildly complex cyst.  3. 2 mm left upper pole renal nonobstructing stone.     Radiation dose reduction techniques were utilized, including automated  exposure control and exposure modulation based on body size.        This report was finalized on 10/23/2023 7:28 PM by Dr. Ck Clarke M.D on Workstation: [id number]       Scheduled Medications  apixaban, 2.5 mg, Oral, Q12H  cefTRIAXone, 2,000 mg, Intravenous, Q24H  finasteride, 5 mg, Oral, Daily  levothyroxine, 50 mcg, Oral, Q AM  metoprolol tartrate, 12.5 mg, Oral, Q12H  senna-docusate sodium, 2 tablet, Oral, BID  tamsulosin, 0.4 mg, Oral, Daily  terazosin, 1 mg, Oral, Nightly    Infusions  sodium chloride, 75 mL/hr, Last Rate: 75 mL/hr (10/23/23 2127)    Diet  Diet: Cardiac Diets; Healthy Heart (2-3 Na+); Texture: Regular Texture (IDDSI 7); Fluid Consistency: Thin (IDDSI 0)    I have personally reviewed     [x]  Laboratory   [x]  Microbiology   [x]  Radiology   []  EKG/Telemetry  []  Cardiology/Vascular   []  Pathology    []  Records       Assessment/Plan     Active Hospital Problems    Diagnosis  POA    **DILLON (acute kidney injury) [N17.9]  Yes    Atrial fibrillation [I48.91]  Unknown    Stage 3a chronic kidney disease [N18.31]  Yes    CHF (congestive heart  failure) [I50.9]  Yes    Hypothyroidism [E03.9]  Yes    BPH (benign prostatic hyperplasia) [N40.0]  Yes      Resolved Hospital Problems   No resolved problems to display.       Patient is a 89-year-old male with a history of atrial fibrillation, chronic diastolic heart failure, CKD stage III, hypothyroidism, history of recurrent UTIs, was sent to the ED after abnormal labs platelets neurology clinic, showed increased creatinine up to 4.7.      DILLON on CKD.  Creatinine was 4.73 on admission, was initiated on IV fluids, repeat creatinine improved at 4.21,  CT scan showed moderate to severe left and moderate right hydronephrosis with  mild urothelial thickening suspicious for upper tract infection  Urology and nephrology consulted      UTI: Urine culture and blood cultures pending.  Continue with ceftriaxone 2 g every 24 hours.  PSA elevated at  26, concern for prostatitis.  will consult ID.      Enlarged prostate/right PSA:-CT scan showed prostate gland enlargement with median lobe hypertrophy.  PSA elevated as above.  Urology consulted.  On IV antibiotics.  Continue tamsulosin, finasteride      Paroxysmal atrial fibrillation: Discontinue carvedilol as BP on the softer side, initiated on metoprolol titrate 12.5 mg twice daily.  Continue apixaban 2.5 mg twice daily.          Chronic diastolic heart failure: Echocardiogram 10/10/2022 showed EF of 31-35 %.  Currently euvolemic.  On IV fluids, monitor volume status.       Hypothyroidism: Continue home levothyroxine, repeat TSH      Anemia: Hemoglobin 8.3 this morning, down from 11.1 yesterday.  No signs of bleeding reported.  Repeat H&H this afternoon, ordered iron panel B12 and folate.  If hemoglobin trending down further will hold Eliquis.        DVT prophylax: On apixaban  CODE STATUS: DNR/DNI  Discussed with patient and family.  Discussed in multidisciplinary rounds with nursing staff, CCP, pharmacy  Anticipate discharge BD      Copied text in this note has been  reviewed and is accurate as of 10/24/23.         Dictated utilizing Dragon dictation        Malini Weathers MD  [facility]  10/24/23  09:53 EDT

## 2023-10-25 ENCOUNTER — READMISSION MANAGEMENT (OUTPATIENT)
Dept: CALL CENTER | Facility: HOSPITAL | Age: 88
End: 2023-10-25
Payer: MEDICARE

## 2023-10-25 VITALS
BODY MASS INDEX: 21.5 KG/M2 | TEMPERATURE: 97.3 F | OXYGEN SATURATION: 99 % | RESPIRATION RATE: 18 BRPM | SYSTOLIC BLOOD PRESSURE: 114 MMHG | WEIGHT: 158.73 LBS | DIASTOLIC BLOOD PRESSURE: 77 MMHG | HEART RATE: 89 BPM | HEIGHT: 72 IN

## 2023-10-25 LAB
ALBUMIN SERPL-MCNC: 2.9 G/DL (ref 3.5–5.2)
ANION GAP SERPL CALCULATED.3IONS-SCNC: 13.8 MMOL/L (ref 5–15)
BUN SERPL-MCNC: 67 MG/DL (ref 8–23)
BUN/CREAT SERPL: 16.1 (ref 7–25)
CALCIUM SPEC-SCNC: 8.1 MG/DL (ref 8.2–9.6)
CHLORIDE SERPL-SCNC: 109 MMOL/L (ref 98–107)
CO2 SERPL-SCNC: 15.2 MMOL/L (ref 22–29)
CREAT SERPL-MCNC: 4.17 MG/DL (ref 0.76–1.27)
DEPRECATED RDW RBC AUTO: 45.7 FL (ref 37–54)
EGFRCR SERPLBLD CKD-EPI 2021: 12.6 ML/MIN/1.73
ERYTHROCYTE [DISTWIDTH] IN BLOOD BY AUTOMATED COUNT: 14.3 % (ref 12.3–15.4)
GLUCOSE SERPL-MCNC: 101 MG/DL (ref 65–99)
HCT VFR BLD AUTO: 27.1 % (ref 37.5–51)
HGB BLD-MCNC: 8.8 G/DL (ref 13–17.7)
MCH RBC QN AUTO: 28.5 PG (ref 26.6–33)
MCHC RBC AUTO-ENTMCNC: 32.5 G/DL (ref 31.5–35.7)
MCV RBC AUTO: 87.7 FL (ref 79–97)
PHOSPHATE SERPL-MCNC: 5.7 MG/DL (ref 2.5–4.5)
PLATELET # BLD AUTO: 250 10*3/MM3 (ref 140–450)
PMV BLD AUTO: 9.4 FL (ref 6–12)
POTASSIUM SERPL-SCNC: 4.2 MMOL/L (ref 3.5–5.2)
RBC # BLD AUTO: 3.09 10*6/MM3 (ref 4.14–5.8)
SODIUM SERPL-SCNC: 138 MMOL/L (ref 136–145)
WBC NRBC COR # BLD: 12.23 10*3/MM3 (ref 3.4–10.8)

## 2023-10-25 PROCEDURE — 80069 RENAL FUNCTION PANEL: CPT | Performed by: HOSPITALIST

## 2023-10-25 PROCEDURE — 97162 PT EVAL MOD COMPLEX 30 MIN: CPT

## 2023-10-25 PROCEDURE — 85027 COMPLETE CBC AUTOMATED: CPT | Performed by: STUDENT IN AN ORGANIZED HEALTH CARE EDUCATION/TRAINING PROGRAM

## 2023-10-25 PROCEDURE — 97530 THERAPEUTIC ACTIVITIES: CPT

## 2023-10-25 RX ORDER — TAMSULOSIN HYDROCHLORIDE 0.4 MG/1
0.4 CAPSULE ORAL NIGHTLY
Qty: 30 CAPSULE | Refills: 3 | Status: SHIPPED | OUTPATIENT
Start: 2023-10-25

## 2023-10-25 RX ORDER — CEPHALEXIN 500 MG/1
500 CAPSULE ORAL 3 TIMES DAILY
Qty: 30 CAPSULE | Refills: 0 | Status: SHIPPED | OUTPATIENT
Start: 2023-10-25 | End: 2023-11-04

## 2023-10-25 RX ORDER — HYDROXYZINE HYDROCHLORIDE 25 MG/1
25 TABLET, FILM COATED ORAL 3 TIMES DAILY PRN
Status: DISCONTINUED | OUTPATIENT
Start: 2023-10-25 | End: 2023-10-25 | Stop reason: HOSPADM

## 2023-10-25 RX ADMIN — APIXABAN 2.5 MG: 2.5 TABLET, FILM COATED ORAL at 09:27

## 2023-10-25 RX ADMIN — SILODOSIN 8 MG: 8 CAPSULE ORAL at 09:28

## 2023-10-25 RX ADMIN — METOPROLOL TARTRATE 12.5 MG: 25 TABLET, FILM COATED ORAL at 10:33

## 2023-10-25 RX ADMIN — LEVOTHYROXINE SODIUM 50 MCG: 50 TABLET ORAL at 05:29

## 2023-10-25 RX ADMIN — HYDROXYZINE HYDROCHLORIDE 25 MG: 25 TABLET ORAL at 03:30

## 2023-10-25 NOTE — PROGRESS NOTES
"Nutrition Services    Patient Name:  Rafa Bautista  YOB: 1928  MRN: [id number]  Admit Date:  10/23/2023    Assessment Date:  10/25/23    Summary: Nutrition assessment triggered by MST score of 2.  Admitted with abnormal labs, DILLON, UTI.  Creatinine trending down slowly.    No PO intake available per chart review.  Patient reports his appetite is \"too good.\"  Family at bedside reports patient hasn't been eating as much over the past 6 months, not hungry as often.  Used to love to eat.  Family reports 30 lb (16%) weight loss over the past 6 months.    Patient meets ASPEN/AND criteria for nutrition diagnosis of severe malnutrition of chronic illness based on: poor PO intake, weight loss, nutrition focused physical exam.    Adding Boost Plus daily for him to try (family says to try chocolate).      RD to continue to follow.    CLINICAL NUTRITION ASSESSMENT      Reason for Assessment MST score 2+, Unintentional Weight Loss      Diagnosis/Problem   DILLON   Medical/Surgical History Past Medical History:   Diagnosis Date    Atrial fibrillation     BPH (benign prostatic hyperplasia)     CHF (congestive heart failure)     Hyperlipidemia     Hypertension        Past Surgical History:   Procedure Laterality Date    BACK SURGERY          Anthropometrics        Current Height  Current Weight  BMI kg/m2 Height: 182.9 cm (72\")  Weight: 72 kg (158 lb 11.7 oz) (10/25/23 0500)  Body mass index is 21.53 kg/m².   Adjusted BMI (if applicable)    BMI Category Normal/Healthy (18.4 - 24.9)   Ideal Body Weight (IBW) 178 lb (80.9 kg)   Usual Body Weight (UBW) 188 lb   Weight Trend Loss, Amount/Timeframe: 30 lb (16%) x 6 months   Weight History Wt Readings from Last 30 Encounters:   10/25/23 0500 72 kg (158 lb 11.7 oz)   10/24/23 0538 73.6 kg (162 lb 2.8 oz)   10/23/23 1620 74.4 kg (164 lb)   06/07/23 1229 82.6 kg (182 lb)   02/07/23 0946 86.2 kg (190 lb)   12/05/22 1402 92.5 kg (204 lb)   11/07/22 1402 94.3 kg (208 lb) "   10/15/22 0517 90.6 kg (199 lb 12.8 oz)   10/14/22 0542 88.2 kg (194 lb 6.4 oz)   10/13/22 0421 88.7 kg (195 lb 9.6 oz)   10/12/22 0342 90.7 kg (199 lb 15.3 oz)   10/11/22 0454 90.5 kg (199 lb 8.3 oz)   10/10/22 0835 92.1 kg (203 lb)   10/10/22 0528 92.5 kg (203 lb 14.8 oz)   10/09/22 0511 91.5 kg (201 lb 11.2 oz)   10/08/22 1922 90.7 kg (200 lb)   06/07/19 0214 104 kg (230 lb)   11/04/17 0128 97.5 kg (215 lb)      --  Estimated/Assessed Needs        Current Weight  Weight: 72 kg (158 lb 11.7 oz) (10/25/23 0500)       Energy Requirements    Weight for Calculation 158 lb (72 kg)   Method for Estimation  25 kcal/kg, 30 kcal/kg   EST Needs (kcal/day) 0247-3172       Protein Requirements    Weight for Calculation 158 lb (72 kg)   EST Protein Needs (g/kg) 1.0 - 1.2 gm/kg   EST Daily Needs (g/day) 72-86       Fluid Requirements     Method for Estimation 1 mL/kcal    EST Needs (mL/day) 2200     Labs       Pertinent Labs    Results from last 7 days   Lab Units 10/25/23  0603 10/24/23  0607 10/23/23  1630   SODIUM mmol/L 138 141 141   POTASSIUM mmol/L 4.2 4.2 4.5   CHLORIDE mmol/L 109* 112* 108*   CO2 mmol/L 15.2* 14.4* 16.1*   BUN mg/dL 67* 74* 79*   CREATININE mg/dL 4.17* 4.21* 4.73*   CALCIUM mg/dL 8.1* 7.8* 8.7   BILIRUBIN mg/dL  --   --  0.5   ALK PHOS U/L  --   --  94   ALT (SGPT) U/L  --   --  7   AST (SGOT) U/L  --   --  8   GLUCOSE mg/dL 101* 105* 127*     Results from last 7 days   Lab Units 10/25/23  0603 10/24/23  1605 10/24/23  0607   MAGNESIUM mg/dL  --   --  1.8   PHOSPHORUS mg/dL 5.7*  --  5.2*   HEMOGLOBIN g/dL 8.8*   < > 8.3*   HEMATOCRIT % 27.1*   < > 25.7*   WBC 10*3/mm3 12.23*  --  8.70   ALBUMIN g/dL 2.9*  --   --     < > = values in this interval not displayed.     Results from last 7 days   Lab Units 10/25/23  0603 10/24/23  0607 10/23/23  1630   PLATELETS 10*3/mm3 250 240 306     COVID19   Date Value Ref Range Status   10/08/2022 Not Detected Not Detected - Ref. Range Final     Lab Results    Component Value Date    HGBA1C 5.60 10/09/2022          Medications           Scheduled Medications apixaban, 2.5 mg, Oral, Q12H  cefTRIAXone, 2,000 mg, Intravenous, Q24H  finasteride, 5 mg, Oral, Daily  levothyroxine, 50 mcg, Oral, Q AM  metoprolol tartrate, 12.5 mg, Oral, Q12H  senna-docusate sodium, 2 tablet, Oral, BID  silodosin, 8 mg, Oral, Daily  tamsulosin, 0.4 mg, Oral, Nightly       Infusions sodium chloride 0.45 % 925 mL with sodium bicarbonate 8.4 % 75 mEq infusion, , Last Rate: 74 mL/hr at 10/24/23 1232       PRN Medications   acetaminophen    senna-docusate sodium **AND** polyethylene glycol **AND** bisacodyl **AND** bisacodyl    hydrOXYzine    melatonin    ondansetron **OR** ondansetron     Physical Findings          General Findings confused, disoriented, loss of muscle mass, loss of subcutaneous fat, room air   Oral/Mouth Cavity WNL   Edema  no edema   Gastrointestinal last bowel movement: 10/23   Skin  bruising   Tubes/Drains/Lines none   NFPE See Malnutrition Severity Assessment, Date Completed: 10/25   --  Malnutrition Severity Assessment      Patient meets criteria for : Severe Malnutrition  Malnutrition Type (last 8 hours)       Malnutrition Severity Assessment       Row Name 10/25/23 1541       Malnutrition Severity Assessment    Malnutrition Type Chronic Disease - Related Malnutrition      Row Name 10/25/23 1541       Insufficient Energy Intake     Insufficient Energy Intake Findings Moderate    Insufficient Energy Intake  <75% of est. energy requirement for > or equal to 3 months      Row Name 10/25/23 1541       Unintentional Weight Loss     Unintentional Weight Loss Findings Severe    Unintentional Weight Loss  Weight loss greater than 10% in six months      Row Name 10/25/23 1541       Muscle Loss    Loss of Muscle Mass Findings Severe    [place] Region Severe - deep hollowing/scooping, lack of muscle to touch, facial bones well defined    Clavicle Bone Region Severe - protruding  prominent bone    Acromion Bone Region Severe - squared shoulders, bones, and acromion process protrusion prominent    Scapular Bone Region Severe - prominent bones, depressions easily visible between ribs, scapula, spine, shoulders    Patellar Region Moderate - patella more prominent, less muscle definition around patella      Row Name 10/25/23 1541       Fat Loss    Subcutaneous Fat Loss Findings Severe    Orbital Region  Severe - pronounced hollowness/depression, dark circles, loose saggy skin    Upper Arm Region Moderate - some fat tissue, not ample      Row Name 10/25/23 1541       Criteria Met (Must meet criteria for severity in at least 2 of these categories: M Wasting, Fat Loss, Fluid, Secondary Signs, Wt. Status, Intake)    Patient meets criteria for  Severe Malnutrition                     Current Nutrition Orders & Evaluation of Intake       Oral Nutrition     Food Allergies NKFA   Current PO Diet Diet: Cardiac Diets; Healthy Heart (2-3 Na+); Texture: Regular Texture (IDDSI 7); Fluid Consistency: Thin (IDDSI 0)   Supplement n/a   PO Evaluation     % PO Intake No intake available    Factors Affecting Intake: altered mental status, decreased appetite   --  PES STATEMENT / NUTRITION DIAGNOSIS      Nutrition Dx Problem  Problem: Malnutrition (severe)  Etiology: Factors Affecting Nutrition - decreased vinicio, confusion    Signs/Symptoms: NFPE Results, Unintended Weight Change, and Report/Observation     NUTRITION INTERVENTION / PLAN OF CARE      Intervention Goal(s) Maintain nutrition status, Reduce/improve symptoms, Meet estimated needs, Disease management/therapy, Establish PO intake, Tolerate PO , and Maintain weight         RD Intervention/Action Encourage intake, Continue to monitor, Care plan reviewed, and Recommend/order: ONS   --      Prescription/Orders:       PO Diet       Supplements Boost Plus daily (woodrow)      Enteral Nutrition       Parenteral Nutrition    New Prescription Ordered? Yes   --       Monitor/Evaluation Per protocol, PO intake, Supplement intake, Pertinent labs, Weight, Symptoms   Discharge Plan/Needs Pending clinical course   --    RD to follow per protocol.      Electronically signed by:  Keira Aguillon RD  10/25/23 15:32 EDT

## 2023-10-25 NOTE — PLAN OF CARE
Problem: Malnutrition  Goal: Improved Nutritional Intake  Outcome: Ongoing, Progressing  Intervention: Promote and Optimize Oral Intake  Flowsheets (Taken 10/25/2023 1553)  Oral Nutrition Promotion:   calorie-dense liquids provided   nutritional therapy counseling provided   Goal Outcome Evaluation:  Adding Boost Plus daily  Encouraged PO intake  RD to follow

## 2023-10-25 NOTE — NURSING NOTE
PIV attempted x2. Vein blew on first attempt, pt not tolerating stick and asked me to stop on second attempt. Primary RN notified.

## 2023-10-25 NOTE — CONSULTS
CONSULT NOTE    Infectious Diseases - Laverne Charles MD  [facility]       Patient Identification:  Name: Rafa Bautista  Age: 89 y.o.  Sex: male  :  1928  MRN: [id number]             Date of Consultation: 10/24/2023      Primary Care Physician: Beverly Lino MD                               Requesting Physician:   Reason for Consultation: Prostatitis    Impression: Patient is a 89-year-old male who has history of recurrent urinary tract infection, enlarged prostate, urinary retention and history of atrial fibrillation and chronic kidney disease who was noted to have worsening renal function and was brought to the emergency room for further evaluation on 10/23/2023.  Patient was noted to have abnormal urinalysis consistent with UTI for which patient was started on ceftriaxone while blood cultures and urine cultures were sent.  According to the patient's son at the bedside patient has been having episodes of urinary tract infections in the last few years which is treated with 5 to 7 days of antibiotic therapy with improvement in his overall mental status confusion disorientation only for the symptoms to recur.  In this background patient was admitted with above complaint on 10/23/2023 and CT scan of the abdomen pelvis did reveal hydronephrosis with urinary retention.  PSA level was done which came back elevated at 26 with last PSA unknown at our facility was 3.47 five years ago.  Patient is recommended to have intermittent catheterizations by urology service and Flomax was added.  Patient himself is pleasantly disoriented but interactive.  Throughout this hospitalization patient has been afebrile.  And his blood cultures and urine cultures have being so far negative.  According to the send patient mental status improved.  Infectious disease was consulted about antibiotic treatment for prostatitis in this situation of chronic renal dysfunction and urinary retention and  enlarged prostate and episodes of confusion disorientation due to UTI improving on short course of antibiotic treatment with recurrences.  1-recurrent urinary tract infection due to urinary retention and enlarged prostate with likely acute on chronic prostatitis  2-acute on chronic renal failure multifactorial including chronic kidney disease exacerbated by urinary retention with benign prostatic hyperplasia  3-declining functional status  4-at risk of complications of treatment such as prolonged antibiotic therapy causing worsening renal function and complications such as C. difficile infection and interaction with other medications  5-short-term memory issues with recurrent confusion and disorientation from stress of medical illness including UTIs  6-history of congestive heart failure  7-atrial fibrillation  8-hypertension      Recommendations/Discussions:  This is a difficult situation.    Patient clearly has clinical acute on chronic prostatitis causing episodic worsening of his urinary retention due to prostatic inflammation in the background of chronic retention and chronic kidney disease.  Ideally patient would need 4 to 6 weeks of concerted antibiotic therapy to address his prostatitis but given his age, comorbidities and associated renal function and no clear plan for properly alleviate his ongoing urinary obstruction benefit of this venture need to be careful weighted against the side effects of antibiotic treatment such as risk of C. difficile infection, worsening renal function and interaction with other medications.  I had long conversation with patient's son about these complications.  Continue ceftriaxone for now while his urinary obstruction due to enlarged prostate and subsequent urinary retention is being addressed with intermittent catheterization and Flomax.  Modify antibiotic therapy based on the urine culture results.  If the urine cultures remain negative then patient can be switched to oral  Keflex for short duration for UTI unless urology service agrees and prolonged antibiotic therapy may be beneficial in this situation of significantly enlarged prostate to alleviate his urinary obstruction and recurrence of urinary tract infection.  This is a difficult situation.  Thank you Dr. Weathers for letting me be the part of your patient care please see above impression and recommendations      History of Present Illness:   Patient is a 89-year-old male who has history of recurrent urinary tract infection, enlarged prostate, urinary retention and history of atrial fibrillation and chronic kidney disease who was noted to have worsening renal function and was brought to the emergency room for further evaluation on 10/23/2023.  Patient was noted to have abnormal urinalysis consistent with UTI for which patient was started on ceftriaxone while blood cultures and urine cultures were sent.  According to the patient's son at the bedside patient has been having episodes of urinary tract infections in the last few years which is treated with 5 to 7 days of antibiotic therapy with improvement in his overall mental status confusion disorientation only for the symptoms to recur.  In this background patient was admitted with above complaint on 10/23/2023 and CT scan of the abdomen pelvis did reveal hydronephrosis with urinary retention.  PSA level was done which came back elevated at 26 with last PSA unknown at our facility was 3.47 five years ago.  Patient is recommended to have intermittent catheterizations by urology service and Flomax was added.  Patient himself is pleasantly disoriented but interactive.  Throughout this hospitalization patient has been afebrile.  And his blood cultures and urine cultures have being so far negative.  According to the send patient mental status improved.  Infectious disease was consulted about antibiotic treatment for prostatitis in this situation of chronic renal dysfunction and  urinary retention and enlarged prostate and episodes of confusion disorientation due to UTI improving on short course of antibiotic treatment with recurrences.      Past Medical History:  Past Medical History:   Diagnosis Date    Atrial fibrillation     BPH (benign prostatic hyperplasia)     CHF (congestive heart failure)     Hyperlipidemia     Hypertension      Past Surgical History:  Past Surgical History:   Procedure Laterality Date    BACK SURGERY        Home Meds:  Medications Prior to Admission   Medication Sig Dispense Refill Last Dose    apixaban (ELIQUIS) 2.5 MG tablet tablet Take 1 tablet by mouth Every 12 (Twelve) Hours. Indications: Atrial Fibrillation 180 tablet 1 10/23/2023    carvedilol (COREG) 12.5 MG tablet Take 1 tablet by mouth 2 (Two) Times a Day With Meals. 180 tablet 3 10/23/2023    ferrous gluconate (FERGON) 324 MG tablet Take 1 tablet by mouth Daily With Breakfast.   10/23/2023    finasteride (PROSCAR) 5 MG tablet Take 1 tablet by mouth Daily.   10/23/2023    levothyroxine (SYNTHROID, LEVOTHROID) 50 MCG tablet Take 1 tablet by mouth Every Morning.   10/23/2023    silodosin (RAPAFLO) 4 MG capsule capsule Take 2 capsules by mouth Daily With Breakfast.   10/23/2023    terazosin (HYTRIN) 1 MG capsule Take 1 capsule by mouth Every Night.   10/23/2023     Current Meds:     Current Facility-Administered Medications:     acetaminophen (TYLENOL) tablet 650 mg, 650 mg, Oral, Q4H PRN, Theresa Padilla MD, 650 mg at 10/23/23 2202    apixaban (ELIQUIS) tablet 2.5 mg, 2.5 mg, Oral, Q12H, Theresa Padilla MD, 2.5 mg at 10/24/23 0855    sennosides-docusate (PERICOLACE) 8.6-50 MG per tablet 2 tablet, 2 tablet, Oral, BID **AND** polyethylene glycol (MIRALAX) packet 17 g, 17 g, Oral, Daily PRN **AND** bisacodyl (DULCOLAX) EC tablet 5 mg, 5 mg, Oral, Daily PRN **AND** bisacodyl (DULCOLAX) suppository 10 mg, 10 mg, Rectal, Daily PRN, Theresa Padilla MD    cefTRIAXone (ROCEPHIN) 2,000 mg in  sodium chloride 0.9 % 100 mL IVPB-VTB, 2,000 mg, Intravenous, Q24H, Theresa Padilla MD, Last Rate: 200 mL/hr at 10/23/23 2126, 2,000 mg at 10/23/23 2126    finasteride (PROSCAR) tablet 5 mg, 5 mg, Oral, Daily, Theresa Padilla MD    levothyroxine (SYNTHROID, LEVOTHROID) tablet 50 mcg, 50 mcg, Oral, Q AM, Theresa Padilla MD, 50 mcg at 10/24/23 0520    melatonin tablet 3 mg, 3 mg, Oral, Nightly PRN, Theresa Padilla MD    metoprolol tartrate (LOPRESSOR) tablet 12.5 mg, 12.5 mg, Oral, Q12H, Malini Weathers MD    Non-Formulary / Patient Supplied Medication, 8 mg, Oral, Daily, Derian Lake MD    ondansetron (ZOFRAN) tablet 4 mg, 4 mg, Oral, Q6H PRN **OR** ondansetron (ZOFRAN) injection 4 mg, 4 mg, Intravenous, Q6H PRN, Theresa Padilla MD    sodium chloride 0.45 % 925 mL with sodium bicarbonate 8.4 % 75 mEq infusion, , Intravenous, Continuous, Ariel West MD, Last Rate: 74 mL/hr at 10/24/23 1232, New Bag at 10/24/23 1232    tamsulosin (FLOMAX) 24 hr capsule 0.4 mg, 0.4 mg, Oral, Nightly, Derian Lake MD  Allergies:  Allergies   Allergen Reactions    Doxazosin Other (See Comments)    Penicillins     Tamsulosin Other (See Comments)     Social History:   Social History     Tobacco Use    Smoking status: Former    Smokeless tobacco: Not on file   Substance Use Topics    Alcohol use: Not Currently     Comment: rarely      Family History:  History reviewed. No pertinent family history.       Review of Systems  See history of present illness and past medical history.  Patient denies headache, dizziness, syncope, falls, trauma, change in vision, change in hearing, change in taste, changes in weight, changes in appetite, focal weakness, numbness, or paresthesia.  Patient denies chest pain, palpitations, dyspnea, orthopnea, PND, cough, sinus pressure, rhinorrhea, epistaxis, hemoptysis, nausea, vomiting,hematemesis, diarrhea, constipation or hematchezia.  Denies cold or  "heat intolerance, polydipsia, polyuria, polyphagia. Denies hematuria, pyuria, dysuria, hesitancy, frequency or urgency. Denies consumption of raw and under cooked meats foods or change in water source.  Denies fever, chills, sweats, night sweats.  Denies missing any routine medications. Remainder of ROS is negative.      Vitals:   /74 (BP Location: Right arm, Patient Position: Sitting)   Pulse 84   Temp 98 °F (36.7 °C) (Oral)   Resp 18   Ht 182.9 cm (72\")   Wt 73.6 kg (162 lb 2.8 oz)   SpO2 99%   BMI 21.99 kg/m²   I/O:   Intake/Output Summary (Last 24 hours) at 10/24/2023 2032  Last data filed at 10/24/2023 1330  Gross per 24 hour   Intake 1068.75 ml   Output 250 ml   Net 818.75 ml     Exam:  Patient is examined using the personal protective equipment as per guidelines from infection control for this particular patient as enacted.  Hand washing was performed before and after patient interaction.  General Appearance:  Elderly chronically ill nontoxic-appearing male who is pleasant and interactive and gets withdrawn during conversation only to attempt engage again.  Does not appear to be in any acute distress.   Head:    Normocephalic, without obvious abnormality, atraumatic   Eyes:    PERRL, conjunctivae/corneas clear, EOM's intact, both eyes   Ears:    Normal external ear canals, both ears   Nose:   Nares normal, septum midline, mucosa normal, no drainage    or sinus tenderness   Throat:   Lips, tongue, gums normal; oral mucosa pink and moist   Neck:   Supple, symmetrical, trachea midline, no adenopathy;     thyroid:  no enlargement/tenderness/nodules; no carotid    bruit or JVD   Back:     Symmetric, no curvature, ROM normal, no CVA tenderness   Lungs:     Clear to auscultation bilaterally, respirations unlabored   Chest Wall:    No tenderness or deformity    Heart:  S1-S2 irregular   Abdomen:   Soft nontender   Extremities:   Extremities normal, atraumatic, no cyanosis or edema   Pulses:   Pulses " palpable in all extremities; symmetric all extremities   Skin: Ecchymotic changes noted but no rash   Neurologic: Pleasantly confused but interactive grossly nonfocal examination       Data Review:    I reviewed the patient's new clinical results.  Results from last 7 days   Lab Units 10/24/23  1605 10/24/23  0607 10/23/23  1630   WBC 10*3/mm3  --  8.70 11.30*   HEMOGLOBIN g/dL 9.1* 8.3* 11.1*   PLATELETS 10*3/mm3  --  240 306     Results from last 7 days   Lab Units 10/24/23  0607 10/23/23  1630   SODIUM mmol/L 141 141   POTASSIUM mmol/L 4.2 4.5   CHLORIDE mmol/L 112* 108*   CO2 mmol/L 14.4* 16.1*   BUN mg/dL 74* 79*   CREATININE mg/dL 4.21* 4.73*   CALCIUM mg/dL 7.8* 8.7   GLUCOSE mg/dL 105* 127*     Microbiology Results (last 10 days)       Procedure Component Value - Date/Time    Blood Culture - Blood, Arm, Left [[id number]]  (Normal) Collected: 10/23/23 2028    Lab Status: Preliminary result Specimen: Blood from Arm, Left Updated: 10/24/23 2102     Blood Culture No growth at 24 hours    Blood Culture - Blood, Arm, Right [[id number]]  (Normal) Collected: 10/23/23 2022    Lab Status: Preliminary result Specimen: Blood from Arm, Right Updated: 10/24/23 2102     Blood Culture No growth at 24 hours          CT Abdomen Pelvis Without Contrast    Result Date: 10/23/2023  1. Abnormal urinary bladder wall thickening with trabeculations and surrounding inflammation consistent with cystitis. Inflammation also extends adjacent to the sigmoid colon at the level of the bladder dome where it is difficult to exclude concomitant sigmoid diverticulitis. There is moderate to severe left and moderate right hydronephrosis with mild urothelial thickening suspicious for upper tract infection. Prostate gland enlargement with median lobe hypertrophy. 2. 3 cm right upper pole low-density lesion with peripheral calcification is most likely a mildly complex cyst. 3. 2 mm left upper pole renal nonobstructing stone.  Radiation dose reduction  techniques were utilized, including automated exposure control and exposure modulation based on body size.   This report was finalized on 10/23/2023 7:28 PM by Dr. Ck Clarke M.D on Workstation: [id number]         Assessment:  Active Hospital Problems    Diagnosis  POA    **DILLON (acute kidney injury) [N17.9]  Yes    Atrial fibrillation [I48.91]  Unknown    Stage 3a chronic kidney disease [N18.31]  Yes    CHF (congestive heart failure) [I50.9]  Yes    Hypothyroidism [E03.9]  Yes    BPH (benign prostatic hyperplasia) [N40.0]  Yes      Resolved Hospital Problems   No resolved problems to display.         Plan:  See above  Laverne Berman MD   10/24/2023  20:32 EDT    Parts of this note may be an electronic transcription/translation of spoken language to printed text using the Dragon dictation system.

## 2023-10-25 NOTE — NURSING NOTE
Post void residual 57mL per bladder scan--unable to measure voided urine as pt refuses urinal, and will only urinate in toilet.  Pt also refusing IV access at this time per IV therapy RN after 2 Rns on floor were unsuccessful in IV insertion.  MD notified of lack of IV access.

## 2023-10-25 NOTE — PLAN OF CARE
Goal Outcome Evaluation:  Plan of Care Reviewed With: patient        Progress: no change  Outcome Evaluation: Alert to slef only, increased confusion at night, Melatonin given, Atarax added, assist x1 to BR, bladder scan and I&O cath for volume>500, tolerating regular diet, IV fluids and antiniotics continued, will CTM

## 2023-10-25 NOTE — PLAN OF CARE
Goal Outcome Evaluation:  Pt's creatinine has not decreased to baseline, but pt confused, agitated, and refusing care.  Family has opted to take pt home and fu outpt.  Discussed palliative/hospice care in home with son as a potential option if they wish to avoid hospitalizations in the future.

## 2023-10-25 NOTE — PLAN OF CARE
Goal Outcome Evaluation:  Plan of Care Reviewed With: (P) patient           Outcome Evaluation: (P) Pt is a 89 y.o. male admitted for DILLON. Hx includes: CKD, CHF, a-fib, BPH and recurrent UTIs. Pt lives with spouse and is Deisy with the use of a spc for household mobility and rw for community ambulation. Pt family is there to assist when needed. Pt denies any recent falls. Pt demo. balance, endurance/activity tolerance, postural/trunk control and strength deficits limiting functional mobility. Pt completed bed mobility with sba, stood with cga and rw and ambulated 150' with cga/min A for walker management. Cueing for sequencing and upright posture. Anticipate home with assist upon d/c. Family declines HH PT.

## 2023-10-25 NOTE — PROGRESS NOTES
"  Infectious Diseases Progress Note    Laverne Berman MD     [facility]  [place]: 2 days  Patient Identification:  Name: Rafa Bautista  Age: 89 y.o.  Sex: male  :  1928  MRN: [id number]         Primary Care Physician: Beverly Lino MD        Subjective: Awake comfortable pleasant does not appear to be in any acute distress being visited by his son at the bedside.  Interval History: See consultation note.    Objective:    Scheduled Meds:apixaban, 2.5 mg, Oral, Q12H  cefTRIAXone, 2,000 mg, Intravenous, Q24H  finasteride, 5 mg, Oral, Daily  levothyroxine, 50 mcg, Oral, Q AM  metoprolol tartrate, 12.5 mg, Oral, Q12H  senna-docusate sodium, 2 tablet, Oral, BID  silodosin, 8 mg, Oral, Daily  tamsulosin, 0.4 mg, Oral, Nightly      Continuous Infusions:sodium chloride 0.45 % 925 mL with sodium bicarbonate 8.4 % 75 mEq infusion, , Last Rate: 74 mL/hr at 10/24/23 1232        Vital signs in last 24 hours:  Temp:  [98 °F (36.7 °C)-98.1 °F (36.7 °C)] 98.1 °F (36.7 °C)  Heart Rate:  [84-94] 94  Resp:  [18] 18  BP: (128-135)/(74-81) 135/81    Intake/Output:    Intake/Output Summary (Last 24 hours) at 10/25/2023 0919  Last data filed at 10/25/2023 0500  Gross per 24 hour   Intake 160 ml   Output 150 ml   Net 10 ml       Exam:  /81 (BP Location: Right arm, Patient Position: Lying)   Pulse 94   Temp 98.1 °F (36.7 °C) (Oral)   Resp 18   Ht 182.9 cm (72\")   Wt 72 kg (158 lb 11.7 oz)   SpO2 100%   BMI 21.53 kg/m²   Patient is examined using the personal protective equipment as per guidelines from infection control for this particular patient as enacted.  Hand washing was performed before and after patient interaction.  General Appearance:    Alert, cooperative                          Head:    Normocephalic, without obvious abnormality, atraumatic                           Eyes:    PERRL, conjunctivae/corneas clear, EOM's intact, both eyes                         Throat:   Lips, tongue, gums " normal; oral mucosa pink and moist                           Neck:   Supple, symmetrical, trachea midline, no JVD                         Lungs:    Clear to auscultation bilaterally, respirations unlabored                 Chest Wall:    No tenderness or deformity                          Heart:  S1-S2 regular                  Abdomen:   Soft nontender                 Extremities:   Extremities normal, atraumatic, no cyanosis or edema                        Pulses:   Pulses palpable in all extremities                            Skin:   Skin is warm and dry,  no rashes or palpable lesions                  Neurologic: Awake and interactive       Data Review:    I reviewed the patient's new clinical results.  Results from last 7 days   Lab Units 10/25/23  0603 10/24/23  1605 10/24/23  0607 10/23/23  1630   WBC 10*3/mm3 12.23*  --  8.70 11.30*   HEMOGLOBIN g/dL 8.8* 9.1* 8.3* 11.1*   PLATELETS 10*3/mm3 250  --  240 306     Results from last 7 days   Lab Units 10/25/23  0603 10/24/23  0607 10/23/23  1630   SODIUM mmol/L 138 141 141   POTASSIUM mmol/L 4.2 4.2 4.5   CHLORIDE mmol/L 109* 112* 108*   CO2 mmol/L 15.2* 14.4* 16.1*   BUN mg/dL 67* 74* 79*   CREATININE mg/dL 4.17* 4.21* 4.73*   CALCIUM mg/dL 8.1* 7.8* 8.7   GLUCOSE mg/dL 101* 105* 127*     Microbiology Results (last 10 days)       Procedure Component Value - Date/Time    Blood Culture - Blood, Arm, Left [[id number]]  (Normal) Collected: 10/23/23 2028    Lab Status: Preliminary result Specimen: Blood from Arm, Left Updated: 10/24/23 2102     Blood Culture No growth at 24 hours    Blood Culture - Blood, Arm, Right [[id number]]  (Normal) Collected: 10/23/23 2022    Lab Status: Preliminary result Specimen: Blood from Arm, Right Updated: 10/24/23 2102     Blood Culture No growth at 24 hours          Brief Urine Lab Results  (Last result in the past 365 days)        Color   Clarity   Blood   Leuk Est   Nitrite   Protein   CREAT   Urine HCG        10/23/23 1702 Yellow    Turbid   Large (3+)   Large (3+)   Negative   100 mg/dL (2+)                 CT Abdomen Pelvis Without Contrast    Result Date: 10/23/2023  1. Abnormal urinary bladder wall thickening with trabeculations and surrounding inflammation consistent with cystitis. Inflammation also extends adjacent to the sigmoid colon at the level of the bladder dome where it is difficult to exclude concomitant sigmoid diverticulitis. There is moderate to severe left and moderate right hydronephrosis with mild urothelial thickening suspicious for upper tract infection. Prostate gland enlargement with median lobe hypertrophy. 2. 3 cm right upper pole low-density lesion with peripheral calcification is most likely a mildly complex cyst. 3. 2 mm left upper pole renal nonobstructing stone.  Radiation dose reduction techniques were utilized, including automated exposure control and exposure modulation based on body size.   This report was finalized on 10/23/2023 7:28 PM by Dr. Ck Clarke M.D on Workstation: [id number]         Assessment:    DILLON (acute kidney injury)    CHF (congestive heart failure)    Hypothyroidism    BPH (benign prostatic hyperplasia)    Stage 3a chronic kidney disease    Atrial fibrillation  1-recurrent urinary tract infection due to urinary retention and enlarged prostate with likely acute on chronic prostatitis  2-acute on chronic renal failure multifactorial including chronic kidney disease exacerbated by urinary retention with benign prostatic hyperplasia  3-declining functional status  4-at risk of complications of treatment such as prolonged antibiotic therapy causing worsening renal function and complications such as C. difficile infection and interaction with other medications  5-short-term memory issues with recurrent confusion and disorientation from stress of medical illness including UTIs  6-history of congestive heart failure  7-atrial fibrillation  8-hypertension         Recommendations/Discussions:  This is a difficult situation.    Patient clearly has clinical acute on chronic prostatitis causing episodic worsening of his urinary retention due to prostatic inflammation in the background of chronic retention and chronic kidney disease.  Ideally patient would need 4 to 6 weeks of concerted antibiotic therapy to address his prostatitis but given his age, comorbidities and associated renal function and no clear plan for properly alleviate his ongoing urinary obstruction benefit of this venture need to be careful weighted against the side effects of antibiotic treatment such as risk of C. difficile infection, worsening renal function and interaction with other medications.  I had long conversation again with patient's son about these complications including consideration for emphasis on symptom management  Continue ceftriaxone for now while his urinary obstruction due to enlarged prostate and subsequent urinary retention is being addressed with intermittent catheterization and Flomax.  Modify antibiotic therapy based on the urine culture results.  If the urine cultures remain negative then patient can be switched to oral Keflex for short duration for UTI unless urology service agrees and prolonged antibiotic therapy may be beneficial in this situation of significantly enlarged prostate to alleviate his urinary obstruction and recurrence of urinary tract infection.  This is a difficult situation.  Laverne Berman MD  10/25/2023  09:19 EDT    Parts of this note may be an electronic transcription/translation of spoken language to printed text using the Dragon dictation system.

## 2023-10-25 NOTE — DISCHARGE SUMMARY
Patient Name: Rafa Bautista  : 1928  MRN: [id number]    Date of Admission: 10/23/2023  Date of Discharge:  10/25/2023  Primary Care Physician: Beverly Lino MD      Discharge Diagnoses     Active Hospital Problems    Diagnosis  POA    **DILLON (acute kidney injury) [N17.9]  Yes    Atrial fibrillation [I48.91]  Yes    Stage 3a chronic kidney disease [N18.31]  Yes    CHF (congestive heart failure) [I50.9]  Yes    Hypothyroidism [E03.9]  Yes    BPH (benign prostatic hyperplasia) [N40.0]  Yes      Resolved Hospital Problems   No resolved problems to display.        Hospital Course     Brief admission history and physical.  Is refer to the H&P for full details.  Very pleasant 89 years old gentleman with a past history of mild dementia/stage IIIb chronic renal failure/benign prostatic hypertrophy/A-fib who was admitted to the hospital secondary to worsening creatinine on routine blood work.  No other significant symptomatology.  Physical examination remarkable for an afebrile patient with stable vital signs/orientation x2/occasional ectopic cardiac beats with grade 2 systolic murmur.  Hospital course.  Initial ER evaluation included a CBC that was normal except a hemoglobin of 11.1, white blood cell of 11.3.  CMP normal except Ehlinger blood sugar of 127, BUN 79, creatinine 4.73, chloride 108, CO2 of 16.1, GFR of 10.8, anion gap of 16.9.  UA suggestive of UTI.  Thick acid was normal.  PSA elevated at 26.  Phosphorus 5.2.  Resume was normal.  ET scan of the abdomen and pelvis without IV contrast revealed thickening of the urinary bladder moderate right hydronephrosis and severe left hydronephrosis with a complex cyst in the right upper kidney pole and a nonobstructive stone in the left upper kidney pole.  Patient was admitted with an acute on top of chronic stage IIIb renal failure associated with elevated PSA and enlarged prostate with a UTI and possible prostatitis.  Nephrology/urology/ID was  consulted.  Patient was started on IV Rocephin and his Proscar and Rapaflo were maintained.  Urology recommended in and out cath.  Nephrology stated that that the acute kidney failure with anion gap metabolic acidosis is secondary to prerenal azotemia because of decreased p.o. intake and also secondary to the hydronephrosis.  His renal function slightly improved.  Urology added Flomax to the above medication and he had good urine output within his last postvoid residual was less than 300 ml.  The patient son and wife decided to take the patient home understanding that his kidney function has not returned back to his baseline and that he would continue to require IV fluid.  They have refused indwelling Brown catheter understanding that the urinary obstruction can get worse and his kidney failure can get worse.  They understand that the renal failure eventually lead to death.  Patient has anemia of chronic disease and has hemoglobin remained stable during this admission.  He also have a history of atrial fibrillation/chronic diastolic heart failure and he remains on Eliquis and beta-blockade.  Lopressor because of hypotension for which patient is being discharged with his family and to follow-up with his primary MD in 2 days/urology/nephrology    Consult Orders (all) (From admission, onward)       Start     Ordered    10/24/23 0951  Inpatient Infectious Diseases Consult  Once        Specialty:  Infectious Diseases  Provider:  Laverne Berman MD    10/24/23 0950    10/23/23 2136  Inpatient Urology Consult  Once        Specialty:  Urology  Provider:  Derian Lake MD    10/23/23 2136    10/23/23 1759  Inpatient Nephrology Consult  Once        Specialty:  Nephrology  Provider:  Dayron Polo MD    10/23/23 1759    10/23/23 1734  [facility] (on-call MD unless specified) Details  Once        Specialty:  Hospitalist  Provider:  (Not yet assigned)    10/23/23 1734                  Procedures     Imaging Results  (All)       Procedure Component Value Units Date/Time    CT Abdomen Pelvis Without Contrast [[id number]] Collected: 10/23/23 1928     Updated: 10/23/23 1931    Narrative:      CT ABDOMEN AND PELVIS WITHOUT IV CONTRAST     HISTORY: Chronic kidney disease, CHF.     TECHNIQUE:  CT includes axial imaging from the lung bases to the  trochanters without intravenous contrast and without use of oral  contrast. Data reconstructed in coronal and sagittal planes. Radiation  dose reduction techniques were utilized, including automated exposure  control and exposure modulation based on body size.     COMPARISON: None     FINDINGS: Lung bases appear clear and there is no basilar effusion.  Liver, decompressed gallbladder, spleen, adrenal glands, pancreas  exhibit normal noncontrast CT appearance.     There is moderate to severe left and moderate right hydronephrosis and  hydroureter to the level of the urinary bladder. There is a 2 mm left  upper pole nonobstructing renal stone. There is abnormal urinary bladder  wall thickening with urinary bladder trabeculations. There is prostate  gland enlargement with median lobe hypertrophy. There is also pericystic  stranding suggesting cystitis. Urothelial thickening is present  involving both ureters and the right renal pelvis suspicious for upper  tract infection. Peripherally calcified right upper pole renal  low-density lesion measures 3 cm diameter.     There is colonic diverticulosis greatest involving the sigmoid colon.  Inflammation is present along the bladder dome and extending adjacent to  the sigmoid colon where it is difficult exclude concomitant sigmoid  diverticulitis. There is no evidence for abscess or drainable  collection. There is advanced degenerative disease within the lumbar  spine. Bony fusion is present at L2-3. There is facet arthritis in the  mid to lower lumbar spine. There is also bridging syndesmophyte or  osteophyte formation within the lower thoracic  spine.       Impression:      1. Abnormal urinary bladder wall thickening with trabeculations and  surrounding inflammation consistent with cystitis. Inflammation also  extends adjacent to the sigmoid colon at the level of the bladder dome  where it is difficult to exclude concomitant sigmoid diverticulitis.  There is moderate to severe left and moderate right hydronephrosis with  mild urothelial thickening suspicious for upper tract infection.  Prostate gland enlargement with median lobe hypertrophy.  2. 3 cm right upper pole low-density lesion with peripheral  calcification is most likely a mildly complex cyst.  3. 2 mm left upper pole renal nonobstructing stone.     Radiation dose reduction techniques were utilized, including automated  exposure control and exposure modulation based on body size.        This report was finalized on 10/23/2023 7:28 PM by Dr. Ck Clarke M.D on Workstation: [id number]               Pertinent Labs     Results from last 7 days   Lab Units 10/25/23  0603 10/24/23  1605 10/24/23  0607 10/23/23  1630   WBC 10*3/mm3 12.23*  --  8.70 11.30*   HEMOGLOBIN g/dL 8.8* 9.1* 8.3* 11.1*   PLATELETS 10*3/mm3 250  --  240 306     Results from last 7 days   Lab Units 10/25/23  0603 10/24/23  0607 10/23/23  1630   SODIUM mmol/L 138 141 141   POTASSIUM mmol/L 4.2 4.2 4.5   CHLORIDE mmol/L 109* 112* 108*   CO2 mmol/L 15.2* 14.4* 16.1*   BUN mg/dL 67* 74* 79*   CREATININE mg/dL 4.17* 4.21* 4.73*   GLUCOSE mg/dL 101* 105* 127*   Estimated Creatinine Clearance: 11 mL/min (A) (by C-G formula based on SCr of 4.17 mg/dL (H)).  Results from last 7 days   Lab Units 10/25/23  0603 10/23/23  1630   ALBUMIN g/dL 2.9* 3.9   BILIRUBIN mg/dL  --  0.5   ALK PHOS U/L  --  94   AST (SGOT) U/L  --  8   ALT (SGPT) U/L  --  7     Results from last 7 days   Lab Units 10/25/23  0603 10/24/23  0607 10/23/23  1630   CALCIUM mg/dL 8.1* 7.8* 8.7   ALBUMIN g/dL 2.9*  --  3.9   MAGNESIUM mg/dL  --  1.8  --    PHOSPHORUS  "mg/dL 5.7* 5.2*  --                Invalid input(s): \"LDLCALC\"  Results from last 7 days   Lab Units 10/23/23  2028 10/23/23  2022 10/23/23  1702   BLOODCX  No growth at 24 hours No growth at 24 hours  --    URINECX   --   --  >100,000 CFU/mL Gram Positive Cocci*     Imaging Results (Last 24 Hours)       ** No results found for the last 24 hours. **            Test Results Pending at Discharge     Pending Labs       Order Current Status    Blood Culture - Blood, Arm, Left Preliminary result    Blood Culture - Blood, Arm, Right Preliminary result    Urine Culture - Urine, Urine, Clean Catch Preliminary result              Discharge Exam   Physical Exam  Vitals.  Temperature 97.3 a pulse of 89 respirate rate of 18 and blood pressure 114/77 O2 sats of 99% on room air  General.  Elderly gentleman.  Alert.  Oriented x2.  In no apparent pain/distress/diaphoresis.  Normal mood and affect.  Eyes.  Pupils equal round and reactive.  Intact extraocular musculature no pallor or jaundice.  Oral cavity.  Moist mucous membrane.  Neck.  Supple.  No JVD.  No lymphadenopathy or thyromegaly.  Cardiovascular.  Regular rate and rhythm with occasional ectopic beats and grade 2 systolic murmur  Chest.  Clear to auscultation bilaterally with no added sounds.  Abdomen.  Soft lax.  No tenderness.  No organomegaly.  No guarding or rebound.  Extremities.  No clubbing/cyanosis/edema.  CNS.  No acute focal neurological deficits    Discharge Details        Discharge Medications        New Medications        Instructions Start Date   cephalexin 500 MG capsule  Commonly known as: Keflex   500 mg, Oral, 3 Times Daily      metoprolol tartrate 25 MG tablet  Commonly known as: LOPRESSOR   12.5 mg, Oral, Every 12 Hours Scheduled      tamsulosin 0.4 MG capsule 24 hr capsule  Commonly known as: FLOMAX   0.4 mg, Oral, Nightly             Continue These Medications        Instructions Start Date   apixaban 2.5 MG tablet tablet  Commonly known as: ELIQUIS   " 2.5 mg, Oral, Every 12 Hours Scheduled      ferrous gluconate 324 MG tablet  Commonly known as: FERGON   324 mg, Oral, Daily With Breakfast      finasteride 5 MG tablet  Commonly known as: PROSCAR   5 mg, Oral, Daily      levothyroxine 50 MCG tablet  Commonly known as: SYNTHROID, LEVOTHROID   50 mcg, Oral, Every Early Morning      silodosin 4 MG capsule capsule  Commonly known as: RAPAFLO   8 mg, Oral, Daily With Breakfast             Stop These Medications      carvedilol 12.5 MG tablet  Commonly known as: COREG     terazosin 1 MG capsule  Commonly known as: HYTRIN              Allergies   Allergen Reactions    Doxazosin Other (See Comments)    Penicillins     Tamsulosin Other (See Comments)         Discharge Disposition:  Condition:     Diet:   Diet Order   Procedures    Diet: Cardiac Diets; Healthy Heart (2-3 Na+); Texture: Regular Texture (IDDSI 7); Fluid Consistency: Thin (IDDSI 0)       Activity:   Activity Instructions       Activity as Tolerated      Up WIth Assist              Counseling :    CODE STATUS:    Code Status and Medical Interventions:   Ordered at: 10/23/23 7452     Medical Intervention Limits:    NO intubation (DNI)     Code Status (Patient has no pulse and is not breathing):    No CPR (Do Not Attempt to Resuscitate)     Medical Interventions (Patient has pulse or is breathing):    Limited Support       Future Appointments   Date Time Provider Department Center   12/21/2023  1:40 PM Hannah Marr MD [facility]     Additional Instructions for the Follow-ups that You Need to Schedule       Call MD With Problems / Concerns   As directed      Instructions: Call MD or return to ER weakness and fatigue/minimal no urine output/nausea or vomiting/fever or chills    Order Comments: Instructions: Call MD or return to ER weakness and fatigue/minimal no urine output/nausea or vomiting/fever or chills         Discharge Follow-up with PCP   As directed       Currently Documented PCP:    Zoie  Beverly Benton MD    PCP Phone Number:    [phone]     Follow Up Details: Primary MD.  2 days.  Acute on top of 3B renal failure/-urine outflow obstruction/prostatism/UTI/A-fib/chronic diastolic congestive heart failure/anemia of chronic disease        Discharge Follow-up with Specified Provider: Nephrology; 1 Week   As directed      To: Nephrology   Follow Up: 1 Week   Follow Up Details: Acute on top of stage IIIb renal failure        Discharge Follow-up with Specified Provider: Urology; 1 Week   As directed      To: Urology   Follow Up: 1 Week   Follow Up Details: Slight enlargement and urine outflow obstruction with hydronephrosis.               Follow-up Information       Beverly Lino MD .    Specialty: Internal Medicine  Why: Primary MD.  2 days.  Acute on top of 3B renal failure/-urine outflow obstruction/prostatism/UTI/A-fib/chronic diastolic congestive heart failure/anemia of chronic disease  Contact information:  [address] [facility]  [address]  [place] [address]  [phone]                               Time Spent on Discharge:  Greater than 30 minutes      Angel Harrington MD  [facility]  10/25/23  16:59 EDT

## 2023-10-25 NOTE — PROGRESS NOTES
[facility] Progress Note      Patient Name: Rafa Bautista  : 1928  MRN: [id number]  Primary Care Physician:  Beverly Lino MD  Date of admission: 10/23/2023    Subjective     Interval History:   The patient was seen and examined today for follow-up on  DILLON   Has no new complaints today.  Afebrile.  Denies any chest pain or shortness of breath.  Urine output has not been recorded.      Review of Systems:   As noted above    Objective     Vitals:   Temp:  [98 °F (36.7 °C)-98.1 °F (36.7 °C)] 98.1 °F (36.7 °C)  Heart Rate:  [84-94] 94  Resp:  [18] 18  BP: (128-135)/(74-81) 135/81    Intake/Output Summary (Last 24 hours) at 10/25/2023 0923  Last data filed at 10/25/2023 0500  Gross per 24 hour   Intake 160 ml   Output 150 ml   Net 10 ml       Physical Exam:    General Appearance: Pleasant not in distress.  Skin: warm and dry  HEENT: oral mucosa normal, nonicteric sclera  Neck: supple, no JVD  Lungs: CTA  Heart: RRR, normal S1 and S2  Abdomen: soft, nontender, nondistended  : no palpable bladder  Extremities: no edema, cyanosis or clubbing  Neuro: normal speech and mental status     Scheduled Meds:     apixaban, 2.5 mg, Oral, Q12H  cefTRIAXone, 2,000 mg, Intravenous, Q24H  finasteride, 5 mg, Oral, Daily  levothyroxine, 50 mcg, Oral, Q AM  metoprolol tartrate, 12.5 mg, Oral, Q12H  senna-docusate sodium, 2 tablet, Oral, BID  silodosin, 8 mg, Oral, Daily  tamsulosin, 0.4 mg, Oral, Nightly      IV Meds:   sodium chloride 0.45 % 925 mL with sodium bicarbonate 8.4 % 75 mEq infusion, , Last Rate: 74 mL/hr at 10/24/23 1232        Results Reviewed:   I have personally reviewed the results from the time of this admission to 10/25/2023 09:23 EDT     Results from last 7 days   Lab Units 10/25/23  0603 10/24/23  0607 10/23/23  1630   SODIUM mmol/L 138 141 141   POTASSIUM mmol/L 4.2 4.2 4.5   CHLORIDE mmol/L 109* 112* 108*   CO2 mmol/L 15.2* 14.4* 16.1*   BUN mg/dL 67* 74* 79*    CREATININE mg/dL 4.17* 4.21* 4.73*   CALCIUM mg/dL 8.1* 7.8* 8.7   BILIRUBIN mg/dL  --   --  0.5   ALK PHOS U/L  --   --  94   ALT (SGPT) U/L  --   --  7   AST (SGOT) U/L  --   --  8   GLUCOSE mg/dL 101* 105* 127*       Estimated Creatinine Clearance: 11 mL/min (A) (by C-G formula based on SCr of 4.17 mg/dL (H)).    Results from last 7 days   Lab Units 10/25/23  0603 10/24/23  0607   MAGNESIUM mg/dL  --  1.8   PHOSPHORUS mg/dL 5.7* 5.2*             Results from last 7 days   Lab Units 10/25/23  0603 10/24/23  1605 10/24/23  0607 10/23/23  1630   WBC 10*3/mm3 12.23*  --  8.70 11.30*   HEMOGLOBIN g/dL 8.8* 9.1* 8.3* 11.1*   PLATELETS 10*3/mm3 250  --  240 306             Assessment / Plan     ASSESSMENT:  DILLON on CKD stage 3b possibly likely prerenal / ATN  secondary to possible hypovolemia due to decreased oral intake, UTI and postobstructive etiology.  Creatinine trending down with IV hydration pointing toward a possible prerenal component  Normal anion gap metabolic acidosis.  On IV bicarbonate  Anemia of CKD.  Iron studies not in favor of iron yesterday anemia  Hypocalcemia  Possible UTI/acute prostatitis  Right complex renal cyst.  Bilateral hydronephrosis secondary to urinary retention followed by urology on Flomax and intermittent self-catheterization.           PLAN:  Creatinine is trending down slowly.  Electrolytes are acceptable except for metabolic acidosis.    His volume status is acceptable.  We will continue IV bicarbonate at the current rate.  Bladder scan every shift and intermittent catheterization if postvoid residual more than 400  Continue to follow along      Thank you for involving us in the care of Rafa Bautista.  Please feel free to call with any questions.    Ariel West MD  10/25/23  09:23 EDT    [facility]  [phone]    Parts of this note may be an electronic transcription/translation of spoken language to printed text using the Dragon dictation  system.

## 2023-10-25 NOTE — THERAPY EVALUATION
Patient Name: Rafa Bautista  : 1928    MRN: [id number]                              Today's Date: 10/25/2023       Admit Date: 10/23/2023    Visit Dx:     ICD-10-CM ICD-9-CM   1. DILLON (acute kidney injury)  N17.9 584.9     Patient Active Problem List   Diagnosis    Acute metabolic encephalopathy    New onset atrial fibrillation    CHF (congestive heart failure)    Hypothyroidism    BPH (benign prostatic hyperplasia)    Stage 3a chronic kidney disease    DILLON (acute kidney injury)    Atrial fibrillation     Past Medical History:   Diagnosis Date    Atrial fibrillation     BPH (benign prostatic hyperplasia)     CHF (congestive heart failure)     Hyperlipidemia     Hypertension      Past Surgical History:   Procedure Laterality Date    BACK SURGERY        General Information       Row Name 10/25/23 0949          Physical Therapy Time and Intention    Document Type evaluation (P)   -LL     Mode of Treatment individual therapy;physical therapy (P)   -LL       Row Name 10/25/23 0949          General Information    Patient Profile Reviewed yes (P)   -LL     Prior Level of Function independent:;gait;transfer (P)   Uses spc for household ambulation and rw for community ambulation  -     Existing Precautions/Restrictions fall (P)   -LL     Barriers to Rehab medically complex (P)   -LL       Row Name 10/25/23 0949          Living Environment    People in Home spouse (P)   -LL       Row Name 10/25/23 0949          Cognition    Orientation Status (Cognition) oriented x 3 (P)   -LL       Row Name 10/25/23 0949          Safety Issues, Functional Mobility    Impairments Affecting Function (Mobility) balance;endurance/activity tolerance;strength  -               User Key  (r) = Recorded By, (t) = Taken By, (c) = Cosigned By      Initials Name Provider Type    CW Vandana Vargas, PT Physical Therapist     Noelle Dodge, PT Student PT Student                   Mobility       Row Name 10/25/23 0951          Bed  Mobility    Bed Mobility supine-sit (P)   -LL     Supine-Sit Sanderson (Bed Mobility) standby assist;verbal cues (P)   -LL     Assistive Device (Bed Mobility) bed rails;head of bed elevated (P)   -LL     Comment, (Bed Mobility) Slow to initiate (P)   -LL       Row Name 10/25/23 0951          Bed-Chair Transfer    Bed-Chair Sanderson (Transfers) contact guard;minimum assist (75% patient effort);verbal cues;nonverbal cues (demo/gesture) (P)   -LL       Row Name 10/25/23 0951          Sit-Stand Transfer    Sit-Stand Sanderson (Transfers) minimum assist (75% patient effort);verbal cues;nonverbal cues (demo/gesture) (P)   -LL     Assistive Device (Sit-Stand Transfers) walker, front-wheeled (P)   -LL       Row Name 10/25/23 0951          Gait/Stairs (Locomotion)    Sanderson Level (Gait) minimum assist (75% patient effort);verbal cues;contact guard  -CW     Assistive Device (Gait) walker, front-wheeled (P)   -LL     Distance in Feet (Gait) 150' (P)   -LL     Deviations/Abnormal Patterns (Gait) stride length decreased;zeferino decreased (P)   -LL     Bilateral Gait Deviations forward flexed posture;heel strike decreased (P)   -LL     Comment, (Gait/Stairs) min A for walker management  -CW               User Key  (r) = Recorded By, (t) = Taken By, (c) = Cosigned By      Initials Name Provider Type    Vandana Ellington, PT Physical Therapist    LL Noelle Dodge, PT Student PT Student                   Obj/Interventions       Row Name 10/25/23 0953          Range of Motion Comprehensive    General Range of Motion bilateral lower extremity ROM WFL (P)   -LL       Row Name 10/25/23 0953          Strength Comprehensive (MMT)    Comment, General Manual Muscle Testing (MMT) Assessment BLE >3/5 MMT (P)   -LL       Row Name 10/25/23 0953          Balance    Balance Assessment sitting static balance;sitting dynamic balance;standing static balance;standing dynamic balance (P)   -LL     Static Sitting Balance  standby assist (P)   -LL     Dynamic Sitting Balance standby assist (P)   -LL     Position, Sitting Balance sitting in chair;sitting edge of bed (P)   -LL     Static Standing Balance contact guard (P)   -LL     Dynamic Standing Balance contact guard  -CW     Position/Device Used, Standing Balance supported;walker, front-wheeled (P)   -LL     Comment, Balance --  -CW               User Key  (r) = Recorded By, (t) = Taken By, (c) = Cosigned By      Initials Name Provider Type    CW Vandana Vargas, PT Physical Therapist    LL Noelle Dodge, PT Student PT Student                   Goals/Plan       Row Name 10/25/23 1000          Bed Mobility Goal 1 (PT)    Activity/Assistive Device (Bed Mobility Goal 1, PT) bed mobility activities, all (P)   -LL     [place] Level/Cues Needed (Bed Mobility Goal 1, PT) independent (P)   -LL     Time Frame (Bed Mobility Goal 1, PT) 2 weeks (P)   -LL       Row Name 10/25/23 1000          Transfer Goal 1 (PT)    Activity/Assistive Device (Transfer Goal 1, PT) transfers, all (P)   -LL     [place] Level/Cues Needed (Transfer Goal 1, PT) modified independence (P)   -LL     Time Frame (Transfer Goal 1, PT) 2 weeks (P)   -LL       Row Name 10/25/23 1000          Gait Training Goal 1 (PT)    Activity/Assistive Device (Gait Training Goal 1, PT) gait (walking locomotion) (P)   -LL     [place] Level (Gait Training Goal 1, PT) modified independence (P)   -LL     Distance (Gait Training Goal 1, PT) 250' (P)   -LL     Time Frame (Gait Training Goal 1, PT) 2 weeks (P)   -LL       Row Name 10/25/23 1000          Therapy Assessment/Plan (PT)    Planned Therapy Interventions (PT) balance training;bed mobility training;gait training;patient/family education;postural re-education;transfer training;strengthening (P)   -LL               User Key  (r) = Recorded By, (t) = Taken By, (c) = Cosigned By      Initials Name Provider Type    LL Noelle Dodge, PT Student PT Student                    Clinical Impression       Row Name 10/25/23 0954          Pain    Pretreatment Pain Rating 0/10 - no pain (P)   -LL     Posttreatment Pain Rating 0/10 - no pain (P)   -LL       Row Name 10/25/23 0954          Plan of Care Review    Plan of Care Reviewed With patient (P)   -LL     Outcome Evaluation Pt is a 89 y.o. male admitted for DILLON. Hx includes: CKD, CHF, a-fib, BPH and recurrent UTIs. Pt lives with spouse and is Deisy with the use of a spc for household mobility and rw for community ambulation. Pt family is there to assist when needed. Pt denies any recent falls. Pt demo. balance, endurance/activity tolerance, postural/trunk control and strength deficits limiting functional mobility. Pt completed bed mobility with sba, stood with cga and rw and ambulated 150' with cga/min A for walker management. Cueing for sequencing and upright posture. Anticipate home with assist upon d/c. Family declines HH PT. (P)   -LL       Row Name 10/25/23 0954          Therapy Assessment/Plan (PT)    Rehab Potential (PT) good, to achieve stated therapy goals (P)   -LL     Criteria for Skilled Interventions Met (PT) yes (P)   -LL     Therapy Frequency (PT) 5 times/wk (P)   -LL       Row Name 10/25/23 0954          Vital Signs    O2 Delivery Pre Treatment room air (P)   -LL     O2 Delivery Post Treatment room air (P)   -LL       Row Name 10/25/23 0954          Positioning and Restraints    Pre-Treatment Position in bed (P)   -LL     Post Treatment Position chair (P)   -LL     In Chair call light within reach;encouraged to call for assist;exit alarm on;notified nsg;legs elevated;with family/caregiver (P)   -LL               User Key  (r) = Recorded By, (t) = Taken By, (c) = Cosigned By      Initials Name Provider Type    LL Noelle Dodge, PT Student PT Student                   Outcome Measures       Row Name 10/25/23 1001          How much help from another person do you currently need...    Turning from your back to your  side while in flat bed without using bedrails? 4 (P)   -LL     Moving from lying on back to sitting on the side of a flat bed without bedrails? 4 (P)   -LL     Moving to and from a bed to a chair (including a wheelchair)? 3 (P)   -LL     Standing up from a chair using your arms (e.g., wheelchair, bedside chair)? 3 (P)   -LL     Climbing 3-5 steps with a railing? 2 (P)   -LL     To walk in hospital room? 3 (P)   -LL     AM-PAC 6 Clicks Score (PT) 19 (P)   -LL     Highest level of mobility 6 --> Walked 10 steps or more (P)   -LL       Row Name 10/25/23 1001          Functional Assessment    Outcome Measure Options AM-PAC 6 Clicks Basic Mobility (PT) (P)   -LL               User Key  (r) = Recorded By, (t) = Taken By, (c) = Cosigned By      Initials Name Provider Type     Noelle Dodge, PT Student PT Student                                 Physical Therapy Education       Title: PT OT SLP Therapies (In Progress)       Topic: Physical Therapy (In Progress)       Point: Mobility training (Done)       Learning Progress Summary             Patient Acceptance, E, VU by  at 10/25/2023 1002   Family Acceptance, E, VU by  at 10/25/2023 1002                         Point: Home exercise program (Not Started)       Learner Progress:  Not documented in this visit.              Point: Body mechanics (Done)       Learning Progress Summary             Patient Acceptance, E, VU by LL at 10/25/2023 1002   Family Acceptance, E, VU by  at 10/25/2023 1002                         Point: Precautions (Not Started)       Learner Progress:  Not documented in this visit.                              User Key       Initials Effective Dates Name Provider Type Discipline     09/12/23 -  Noelle Dodge, PT Student PT Student PT                  PT Recommendation and Plan  Planned Therapy Interventions (PT): (P) balance training, bed mobility training, gait training, patient/family education, postural re-education, transfer  training, strengthening  Plan of Care Reviewed With: (P) patient  Outcome Evaluation: (P) Pt is a 89 y.o. male admitted for DILLON. Hx includes: CKD, CHF, a-fib, BPH and recurrent UTIs. Pt lives with spouse and is Deisy with the use of a spc for household mobility and rw for community ambulation. Pt family is there to assist when needed. Pt denies any recent falls. Pt demo. balance, endurance/activity tolerance, postural/trunk control and strength deficits limiting functional mobility. Pt completed bed mobility with sba, stood with cga and rw and ambulated 150' with cga/min A for walker management. Cueing for sequencing and upright posture. Anticipate home with assist upon d/c. Family declines HH PT.     Time Calculation:         PT Charges       Row Name 10/25/23 1003             Time Calculation    Start Time 0932 (P)   -LL      Stop Time 0947 (P)   -LL      Time Calculation (min) 15 min (P)   -LL      PT Received On 10/25/23 (P)   -LL      PT - Next Appointment 10/26/23 (P)   -LL      PT Goal Re-Cert Due Date 11/08/23 (P)   -LL         Time Calculation- PT    Total Timed Code Minutes- PT 12 minute(s) (P)   -LL         Timed Charges    61375 - PT Therapeutic Activity Minutes 12 (P)   -LL         Total Minutes    Timed Charges Total Minutes 12 (P)   -LL       Total Minutes 12 (P)   -LL                User Key  (r) = Recorded By, (t) = Taken By, (c) = Cosigned By      Initials Name Provider Type    LL Noelle Dodge, PT Student PT Student                  Therapy Charges for Today       Code Description Service Date Service Provider Modifiers Qty    65711371877  PT THERAPEUTIC ACT EA 15 MIN 10/25/2023 Noelle Dodge, PT Student GP 1    82157634340 HC PT EVAL MOD COMPLEXITY 3 10/25/2023 Noelle Dodge, PT Student GP 1            PT G-Codes  Outcome Measure Options: (P) AM-PAC 6 Clicks Basic Mobility (PT)  AM-PAC 6 Clicks Score (PT): (P) 19       Noelle Dodge PT Student  10/25/2023

## 2023-10-26 NOTE — OUTREACH NOTE
Prep Survey      Flowsheet Row Responses   [facility] patient discharged from? [place]   Is LACE score < 7 ? No   Eligibility Readm Mgmt   Discharge diagnosis *DILLON (acute kidney injury   Does the patient have one of the following disease processes/diagnoses(primary or secondary)? Other   Does the patient have Home health ordered? No   Is there a DME ordered? No   Prep survey completed? Yes            LUIS MOHAN - Registered Nurse

## 2023-10-28 LAB
BACTERIA SPEC AEROBE CULT: NORMAL

## 2023-11-10 ENCOUNTER — READMISSION MANAGEMENT (OUTPATIENT)
Dept: CALL CENTER | Facility: HOSPITAL | Age: 88
End: 2023-11-10
Payer: MEDICARE

## 2023-11-10 NOTE — OUTREACH NOTE
Medical Week 3 Survey      Flowsheet Row Responses   [facility] patient discharged from? [place]   Does the patient have one of the following disease processes/diagnoses(primary or secondary)? Other   Week 3 attempt successful? Yes   Call start time 1530   Call end time 1533   Comments regarding appointments Pt has had nephrology f/u.   Has the patient kept scheduled appointments due by today? Yes   What is the patient's perception of their health status since discharge? Improving   Week 3 Call Completed? Yes   Graduated Yes   Wrap up additional comments Son reports pt is feeling as well as can be expected. Son is pleased. Pt has had all f/u appointments. Family will not be seeking aggressive measures.   Call end time 1533            Marcy YOU - Registered Nurse